# Patient Record
Sex: FEMALE | Race: WHITE | NOT HISPANIC OR LATINO | Employment: PART TIME | RURAL
[De-identification: names, ages, dates, MRNs, and addresses within clinical notes are randomized per-mention and may not be internally consistent; named-entity substitution may affect disease eponyms.]

---

## 2018-10-17 ENCOUNTER — HISTORICAL (OUTPATIENT)
Dept: ADMINISTRATIVE | Facility: HOSPITAL | Age: 68
End: 2018-10-17

## 2018-10-18 LAB
LAB AP CLINICAL INFORMATION: NORMAL
LAB AP COMMENTS: NORMAL
LAB AP DIAGNOSIS - HISTORICAL: NORMAL
LAB AP GROSS PATHOLOGY - HISTORICAL: NORMAL
LAB AP SPECIMEN SUBMITTED - HISTORICAL: NORMAL

## 2018-11-30 ENCOUNTER — HISTORICAL (OUTPATIENT)
Dept: ADMINISTRATIVE | Facility: HOSPITAL | Age: 68
End: 2018-11-30

## 2018-12-03 LAB
LAB AP CLINICAL INFORMATION: NORMAL
LAB AP DIAGNOSIS - HISTORICAL: NORMAL
LAB AP GROSS PATHOLOGY - HISTORICAL: NORMAL
LAB AP SPECIMEN SUBMITTED - HISTORICAL: NORMAL

## 2020-04-01 ENCOUNTER — HISTORICAL (OUTPATIENT)
Dept: ADMINISTRATIVE | Facility: HOSPITAL | Age: 70
End: 2020-04-01

## 2020-04-01 LAB
BACTERIA #/AREA URNS HPF: ABNORMAL /HPF
BILIRUB UR QL STRIP: NEGATIVE MG/DL
CLARITY UR: CLEAR
COLOR UR: YELLOW
GLUCOSE UR STRIP-MCNC: NEGATIVE MG/DL
KETONES UR STRIP-SCNC: NEGATIVE MG/DL
LEUKOCYTE ESTERASE UR QL STRIP: NEGATIVE LEU/UL
MUCOUS THREADS #/AREA URNS HPF: ABNORMAL /HPF
NITRITE UR QL STRIP: NEGATIVE
PH UR STRIP: 5.5 PH UNITS (ref 5–8)
PROT UR QL STRIP: NEGATIVE MG/DL
RBC # UR STRIP: NEGATIVE ERY/UL
RBC #/AREA URNS HPF: ABNORMAL /HPF (ref 0–3)
SP GR UR STRIP: 1.02 (ref 1–1.03)
SQUAMOUS #/AREA URNS LPF: ABNORMAL /LPF
UROBILINOGEN UR STRIP-ACNC: 0.2 EU/DL
WBC #/AREA URNS HPF: ABNORMAL /HPF (ref 0–5)

## 2020-04-03 LAB
REPORT: NORMAL

## 2020-06-03 ENCOUNTER — HISTORICAL (OUTPATIENT)
Dept: ADMINISTRATIVE | Facility: HOSPITAL | Age: 70
End: 2020-06-03

## 2020-08-26 ENCOUNTER — HISTORICAL (OUTPATIENT)
Dept: ADMINISTRATIVE | Facility: HOSPITAL | Age: 70
End: 2020-08-26

## 2020-10-02 ENCOUNTER — HISTORICAL (OUTPATIENT)
Dept: ADMINISTRATIVE | Facility: HOSPITAL | Age: 70
End: 2020-10-02

## 2020-10-03 ENCOUNTER — HISTORICAL (OUTPATIENT)
Dept: ADMINISTRATIVE | Facility: HOSPITAL | Age: 70
End: 2020-10-03

## 2020-10-06 LAB
ALBUMIN SERPL BCP-MCNC: 3.4 G/DL (ref 3.5–5)
ALBUMIN/GLOB SERPL: 1 {RATIO}
ALP SERPL-CCNC: 72 U/L (ref 55–142)
ALT SERPL W P-5'-P-CCNC: 21 U/L (ref 13–56)
ANION GAP SERPL CALCULATED.3IONS-SCNC: 9 MMOL/L
APTT PPP: 25.4 SECONDS (ref 25.2–37.3)
AST SERPL W P-5'-P-CCNC: 19 U/L (ref 15–37)
BASOPHILS # BLD AUTO: 0.05 X10E3/UL (ref 0–0.2)
BASOPHILS NFR BLD AUTO: 0.9 % (ref 0–1)
BILIRUB SERPL-MCNC: 0.1 MG/DL (ref 0–1.2)
BUN SERPL-MCNC: 13 MG/DL (ref 7–18)
BUN/CREAT SERPL: 16.3
CALCIUM SERPL-MCNC: 8.5 MG/DL (ref 8.5–10.1)
CHLORIDE SERPL-SCNC: 106 MMOL/L (ref 98–107)
CO2 SERPL-SCNC: 29 MMOL/L (ref 21–32)
CREAT SERPL-MCNC: 0.8 MG/DL (ref 0.55–1.02)
EOSINOPHIL # BLD AUTO: 0.23 X10E3/UL (ref 0–0.5)
EOSINOPHIL NFR BLD AUTO: 4 % (ref 1–4)
ERYTHROCYTE [DISTWIDTH] IN BLOOD BY AUTOMATED COUNT: 14.5 % (ref 11.5–14.5)
GLOBULIN SER-MCNC: 3.4 G/DL (ref 2–4)
GLUCOSE SERPL-MCNC: 86 MG/DL (ref 74–106)
HCT VFR BLD AUTO: 34.9 % (ref 38–47)
HGB BLD-MCNC: 11.7 G/DL (ref 12–16)
INR BLD: 1 (ref 0–3.4)
LYMPHOCYTES # BLD AUTO: 2.07 X10E3/UL (ref 1–4.8)
LYMPHOCYTES NFR BLD AUTO: 36.4 % (ref 27–41)
MAGNESIUM SERPL-MCNC: 2 MG/DL (ref 1.7–2.3)
MCH RBC QN AUTO: 29.8 PG (ref 27–31)
MCHC RBC AUTO-ENTMCNC: 33.5 G/DL (ref 32–36)
MCV RBC AUTO: 89 FL (ref 80–96)
MONOCYTES # BLD AUTO: 0.89 X10E3/UL (ref 0–0.8)
MONOCYTES NFR BLD AUTO: 15.6 % (ref 2–6)
MPC BLD CALC-MCNC: 10 FL (ref 9.4–12.4)
NEUTROPHILS # BLD AUTO: 2.45 X10E3/UL (ref 1.8–7.7)
NEUTROPHILS NFR BLD AUTO: 43.1 % (ref 53–65)
PLATELET # BLD AUTO: 231 X10E3/UL (ref 150–400)
POTASSIUM SERPL-SCNC: 3.8 MMOL/L (ref 3.5–5.1)
PROT SERPL-MCNC: 6.8 G/DL (ref 6.4–8.2)
PROTHROMBIN TIME: 12.7 SECONDS (ref 11.7–14.7)
RBC # BLD AUTO: 3.92 X10E6/UL (ref 4.2–5.4)
SODIUM SERPL-SCNC: 140 MMOL/L (ref 136–145)
TROPONIN I SERPL-MCNC: <0.017 NG/ML (ref 0–0.06)
WBC # BLD AUTO: 5.69 X10E3/UL (ref 4.5–11)

## 2021-04-20 ENCOUNTER — TELEPHONE (OUTPATIENT)
Dept: OBSTETRICS AND GYNECOLOGY | Facility: CLINIC | Age: 71
End: 2021-04-20

## 2021-04-20 RX ORDER — SULFAMETHOXAZOLE AND TRIMETHOPRIM 800; 160 MG/1; MG/1
1 TABLET ORAL 2 TIMES DAILY
COMMUNITY
Start: 2020-02-12 | End: 2021-05-26

## 2021-04-20 RX ORDER — NITROFURANTOIN 25; 75 MG/1; MG/1
100 CAPSULE ORAL DAILY
COMMUNITY
Start: 2020-03-31 | End: 2021-05-26

## 2021-04-20 RX ORDER — ESTRADIOL 0.1 MG/G
1 CREAM VAGINAL
COMMUNITY
Start: 2020-02-12 | End: 2021-05-26

## 2021-04-29 ENCOUNTER — TELEPHONE (OUTPATIENT)
Dept: OBSTETRICS AND GYNECOLOGY | Facility: CLINIC | Age: 71
End: 2021-04-29

## 2021-05-26 ENCOUNTER — OFFICE VISIT (OUTPATIENT)
Dept: CARDIOLOGY | Facility: CLINIC | Age: 71
End: 2021-05-26
Payer: MEDICARE

## 2021-05-26 VITALS
OXYGEN SATURATION: 98 % | BODY MASS INDEX: 35.77 KG/M2 | HEART RATE: 88 BPM | DIASTOLIC BLOOD PRESSURE: 80 MMHG | WEIGHT: 236 LBS | HEIGHT: 68 IN | SYSTOLIC BLOOD PRESSURE: 120 MMHG

## 2021-05-26 DIAGNOSIS — R06.02 SHORTNESS OF BREATH: ICD-10-CM

## 2021-05-26 DIAGNOSIS — G47.30 SLEEP APNEA, UNSPECIFIED TYPE: ICD-10-CM

## 2021-05-26 DIAGNOSIS — I25.110 ATHEROSCLEROSIS OF NATIVE CORONARY ARTERY OF NATIVE HEART WITH UNSTABLE ANGINA PECTORIS: ICD-10-CM

## 2021-05-26 DIAGNOSIS — R53.83 FATIGUE, UNSPECIFIED TYPE: Primary | ICD-10-CM

## 2021-05-26 PROCEDURE — 99204 OFFICE O/P NEW MOD 45 MIN: CPT | Mod: S$PBB,,, | Performed by: INTERNAL MEDICINE

## 2021-05-26 PROCEDURE — 99204 PR OFFICE/OUTPT VISIT, NEW, LEVL IV, 45-59 MIN: ICD-10-PCS | Mod: S$PBB,,, | Performed by: INTERNAL MEDICINE

## 2021-05-26 PROCEDURE — 99214 OFFICE O/P EST MOD 30 MIN: CPT | Mod: PBBFAC | Performed by: INTERNAL MEDICINE

## 2021-05-26 RX ORDER — CLOPIDOGREL BISULFATE 75 MG/1
75 TABLET ORAL DAILY
COMMUNITY
End: 2022-02-08

## 2021-05-26 RX ORDER — ASPIRIN 81 MG/1
81 TABLET ORAL DAILY
COMMUNITY

## 2021-05-28 ENCOUNTER — OFFICE VISIT (OUTPATIENT)
Dept: FAMILY MEDICINE | Facility: CLINIC | Age: 71
End: 2021-05-28
Payer: MEDICARE

## 2021-05-28 VITALS
RESPIRATION RATE: 20 BRPM | TEMPERATURE: 98 F | SYSTOLIC BLOOD PRESSURE: 129 MMHG | HEIGHT: 68 IN | BODY MASS INDEX: 35.92 KG/M2 | WEIGHT: 237 LBS | OXYGEN SATURATION: 98 % | DIASTOLIC BLOOD PRESSURE: 74 MMHG | HEART RATE: 63 BPM

## 2021-05-28 DIAGNOSIS — Z02.4 ENCOUNTER FOR DEPARTMENT OF TRANSPORTATION (DOT) EXAMINATION FOR DRIVING LICENSE RENEWAL: Primary | ICD-10-CM

## 2021-05-28 PROCEDURE — 99499 UNLISTED E&M SERVICE: CPT | Mod: ,,, | Performed by: NURSE PRACTITIONER

## 2021-05-28 PROCEDURE — 99499 PR PHYSICAL - DOT/CDL: ICD-10-PCS | Mod: ,,, | Performed by: NURSE PRACTITIONER

## 2021-06-01 ENCOUNTER — HOSPITAL ENCOUNTER (EMERGENCY)
Facility: HOSPITAL | Age: 71
Discharge: HOME | End: 2021-06-01
Payer: MEDICARE

## 2021-06-01 VITALS
BODY MASS INDEX: 35.38 KG/M2 | RESPIRATION RATE: 18 BRPM | HEART RATE: 80 BPM | DIASTOLIC BLOOD PRESSURE: 68 MMHG | HEIGHT: 68 IN | SYSTOLIC BLOOD PRESSURE: 106 MMHG | TEMPERATURE: 98 F | OXYGEN SATURATION: 96 % | WEIGHT: 233.44 LBS

## 2021-06-01 DIAGNOSIS — K52.9 GASTROENTERITIS: Primary | ICD-10-CM

## 2021-06-01 DIAGNOSIS — R07.9 CHEST PAIN: ICD-10-CM

## 2021-06-01 LAB
ALBUMIN SERPL BCP-MCNC: 3.3 G/DL (ref 3.5–5)
ALBUMIN/GLOB SERPL: 0.9 {RATIO}
ALP SERPL-CCNC: 80 U/L (ref 55–142)
ALT SERPL W P-5'-P-CCNC: 28 U/L (ref 13–56)
AMYLASE SERPL-CCNC: 36 U/L (ref 25–115)
ANION GAP SERPL CALCULATED.3IONS-SCNC: 15 MMOL/L (ref 7–16)
APTT PPP: 25.2 SECONDS (ref 25.2–37.3)
AST SERPL W P-5'-P-CCNC: 17 U/L (ref 15–37)
BACTERIA #/AREA URNS HPF: ABNORMAL /HPF
BASOPHILS # BLD AUTO: 0.01 K/UL (ref 0–0.2)
BASOPHILS NFR BLD AUTO: 0.2 % (ref 0–1)
BILIRUB SERPL-MCNC: 0.3 MG/DL (ref 0–1.2)
BILIRUB UR QL STRIP: ABNORMAL
BUN SERPL-MCNC: 17 MG/DL (ref 7–18)
BUN/CREAT SERPL: 19 (ref 6–20)
CALCIUM SERPL-MCNC: 8 MG/DL (ref 8.5–10.1)
CHLORIDE SERPL-SCNC: 102 MMOL/L (ref 98–107)
CLARITY UR: CLEAR
CO2 SERPL-SCNC: 25 MMOL/L (ref 21–32)
COLOR UR: YELLOW
CREAT SERPL-MCNC: 0.88 MG/DL (ref 0.55–1.02)
EOSINOPHIL # BLD AUTO: 0.17 K/UL (ref 0–0.5)
EOSINOPHIL NFR BLD AUTO: 2.6 % (ref 1–4)
ERYTHROCYTE [DISTWIDTH] IN BLOOD BY AUTOMATED COUNT: 14.2 % (ref 11.5–14.5)
FLUAV AG UPPER RESP QL IA.RAPID: NEGATIVE
FLUBV AG UPPER RESP QL IA.RAPID: NEGATIVE
GLOBULIN SER-MCNC: 3.5 G/DL (ref 2–4)
GLUCOSE SERPL-MCNC: 98 MG/DL (ref 74–106)
GLUCOSE UR STRIP-MCNC: NEGATIVE MG/DL
HCT VFR BLD AUTO: 40.8 % (ref 38–47)
HGB BLD-MCNC: 13.6 G/DL (ref 12–16)
INR BLD: 0.99 (ref 0.9–1.1)
KETONES UR STRIP-SCNC: NEGATIVE MG/DL
LEUKOCYTE ESTERASE UR QL STRIP: NEGATIVE
LIPASE SERPL-CCNC: 73 U/L (ref 73–393)
LYMPHOCYTES # BLD AUTO: 0.99 K/UL (ref 1–4.8)
LYMPHOCYTES NFR BLD AUTO: 15.3 % (ref 27–41)
MAGNESIUM SERPL-MCNC: 1.8 MG/DL (ref 1.7–2.3)
MCH RBC QN AUTO: 30 PG (ref 27–31)
MCHC RBC AUTO-ENTMCNC: 33.3 G/DL (ref 32–36)
MCV RBC AUTO: 90.1 FL (ref 80–96)
MONOCYTES # BLD AUTO: 0.52 K/UL (ref 0–0.8)
MONOCYTES NFR BLD AUTO: 8 % (ref 2–6)
MPC BLD CALC-MCNC: 10.5 FL (ref 9.4–12.4)
MUCOUS THREADS #/AREA URNS HPF: ABNORMAL /HPF
NEUTROPHILS # BLD AUTO: 4.8 K/UL (ref 1.8–7.7)
NEUTROPHILS NFR BLD AUTO: 73.9 % (ref 53–65)
NITRITE UR QL STRIP: NEGATIVE
NT-PROBNP SERPL-MCNC: 72 PG/ML (ref 1–125)
PH UR STRIP: 5.5 PH UNITS
PLATELET # BLD AUTO: 261 K/UL (ref 150–400)
POTASSIUM SERPL-SCNC: 3.7 MMOL/L (ref 3.5–5.1)
PROT SERPL-MCNC: 6.8 G/DL (ref 6.4–8.2)
PROT UR QL STRIP: 100
PROTHROMBIN TIME: 12.7 SECONDS (ref 11.7–14.7)
RBC # BLD AUTO: 4.53 M/UL (ref 4.2–5.4)
RBC # UR STRIP: NEGATIVE /UL
RBC #/AREA URNS HPF: ABNORMAL /HPF
SARS-COV+SARS-COV-2 AG RESP QL IA.RAPID: NEGATIVE
SODIUM SERPL-SCNC: 138 MMOL/L (ref 136–145)
SP GR UR STRIP: >=1.03
SQUAMOUS #/AREA URNS LPF: ABNORMAL /LPF
TROPONIN I SERPL-MCNC: <0.017 NG/ML
TROPONIN I SERPL-MCNC: <0.017 NG/ML
UROBILINOGEN UR STRIP-ACNC: 0.2 MG/DL
WBC # BLD AUTO: 6.49 K/UL (ref 4.5–11)
WBC #/AREA URNS HPF: ABNORMAL /HPF

## 2021-06-01 PROCEDURE — 93010 ELECTROCARDIOGRAM REPORT: CPT | Performed by: FAMILY MEDICINE

## 2021-06-01 PROCEDURE — 99284 EMERGENCY DEPT VISIT MOD MDM: CPT | Mod: GF | Performed by: NURSE PRACTITIONER

## 2021-06-01 PROCEDURE — 82150 ASSAY OF AMYLASE: CPT | Performed by: NURSE PRACTITIONER

## 2021-06-01 PROCEDURE — 93005 ELECTROCARDIOGRAM TRACING: CPT

## 2021-06-01 PROCEDURE — 63600175 PHARM REV CODE 636 W HCPCS: Performed by: NURSE PRACTITIONER

## 2021-06-01 PROCEDURE — 82310 ASSAY OF CALCIUM: CPT | Performed by: NURSE PRACTITIONER

## 2021-06-01 PROCEDURE — 36415 COLL VENOUS BLD VENIPUNCTURE: CPT | Performed by: NURSE PRACTITIONER

## 2021-06-01 PROCEDURE — 80053 COMPREHEN METABOLIC PANEL: CPT | Performed by: NURSE PRACTITIONER

## 2021-06-01 PROCEDURE — 83690 ASSAY OF LIPASE: CPT | Performed by: NURSE PRACTITIONER

## 2021-06-01 PROCEDURE — 96374 THER/PROPH/DIAG INJ IV PUSH: CPT

## 2021-06-01 PROCEDURE — 83880 ASSAY OF NATRIURETIC PEPTIDE: CPT | Performed by: NURSE PRACTITIONER

## 2021-06-01 PROCEDURE — 87426 SARSCOV CORONAVIRUS AG IA: CPT | Mod: CR | Performed by: NURSE PRACTITIONER

## 2021-06-01 PROCEDURE — 81001 URINALYSIS AUTO W/SCOPE: CPT | Performed by: NURSE PRACTITIONER

## 2021-06-01 PROCEDURE — 25000003 PHARM REV CODE 250: Performed by: NURSE PRACTITIONER

## 2021-06-01 PROCEDURE — 99284 EMERGENCY DEPT VISIT MOD MDM: CPT | Mod: 25,CS

## 2021-06-01 PROCEDURE — 85610 PROTHROMBIN TIME: CPT | Performed by: NURSE PRACTITIONER

## 2021-06-01 PROCEDURE — 83735 ASSAY OF MAGNESIUM: CPT | Performed by: NURSE PRACTITIONER

## 2021-06-01 PROCEDURE — 84484 ASSAY OF TROPONIN QUANT: CPT | Performed by: NURSE PRACTITIONER

## 2021-06-01 PROCEDURE — 96361 HYDRATE IV INFUSION ADD-ON: CPT

## 2021-06-01 PROCEDURE — 87804 INFLUENZA ASSAY W/OPTIC: CPT | Performed by: NURSE PRACTITIONER

## 2021-06-01 PROCEDURE — 96372 THER/PROPH/DIAG INJ SC/IM: CPT | Mod: 59

## 2021-06-01 PROCEDURE — 85025 COMPLETE CBC W/AUTO DIFF WBC: CPT | Performed by: NURSE PRACTITIONER

## 2021-06-01 PROCEDURE — 85730 THROMBOPLASTIN TIME PARTIAL: CPT | Performed by: NURSE PRACTITIONER

## 2021-06-01 PROCEDURE — 81003 URINALYSIS AUTO W/O SCOPE: CPT | Performed by: NURSE PRACTITIONER

## 2021-06-01 RX ORDER — PROMETHAZINE HYDROCHLORIDE 25 MG/1
25 TABLET ORAL EVERY 6 HOURS PRN
Qty: 15 TABLET | Refills: 0 | Status: SHIPPED | OUTPATIENT
Start: 2021-06-01 | End: 2022-02-08

## 2021-06-01 RX ORDER — ONDANSETRON 4 MG/1
4 TABLET, FILM COATED ORAL EVERY 6 HOURS
Qty: 12 TABLET | Refills: 0 | Status: SHIPPED | OUTPATIENT
Start: 2021-06-01 | End: 2021-06-01

## 2021-06-01 RX ORDER — ONDANSETRON 4 MG/1
4 TABLET, FILM COATED ORAL EVERY 6 HOURS
Qty: 12 TABLET | Refills: 0 | Status: SHIPPED | OUTPATIENT
Start: 2021-06-01 | End: 2022-02-08

## 2021-06-01 RX ORDER — ONDANSETRON 2 MG/ML
4 INJECTION INTRAMUSCULAR; INTRAVENOUS
Status: COMPLETED | OUTPATIENT
Start: 2021-06-01 | End: 2021-06-01

## 2021-06-01 RX ORDER — PROMETHAZINE HYDROCHLORIDE 25 MG/1
25 TABLET ORAL EVERY 6 HOURS PRN
Qty: 15 TABLET | Refills: 0 | Status: SHIPPED | OUTPATIENT
Start: 2021-06-01 | End: 2021-06-01

## 2021-06-01 RX ADMIN — SODIUM CHLORIDE 1000 ML: 9 INJECTION, SOLUTION INTRAVENOUS at 08:06

## 2021-06-01 RX ADMIN — ONDANSETRON 4 MG: 2 INJECTION INTRAMUSCULAR; INTRAVENOUS at 07:06

## 2021-06-01 RX ADMIN — ONDANSETRON 4 MG: 2 INJECTION INTRAMUSCULAR; INTRAVENOUS at 10:06

## 2021-06-06 ENCOUNTER — HOSPITAL ENCOUNTER (EMERGENCY)
Facility: HOSPITAL | Age: 71
Discharge: ANOTHER HOSPITAL | End: 2021-06-06
Payer: MEDICARE

## 2021-06-06 ENCOUNTER — HOSPITAL ENCOUNTER (INPATIENT)
Facility: HOSPITAL | Age: 71
LOS: 2 days | Discharge: HOME | DRG: 287 | End: 2021-06-08
Attending: INTERNAL MEDICINE | Admitting: HOSPITALIST
Payer: MEDICARE

## 2021-06-06 VITALS
HEART RATE: 63 BPM | TEMPERATURE: 98 F | BODY MASS INDEX: 35.31 KG/M2 | DIASTOLIC BLOOD PRESSURE: 74 MMHG | WEIGHT: 233 LBS | OXYGEN SATURATION: 96 % | SYSTOLIC BLOOD PRESSURE: 133 MMHG | RESPIRATION RATE: 18 BRPM | HEIGHT: 68 IN

## 2021-06-06 DIAGNOSIS — R07.9 CHEST PAIN: ICD-10-CM

## 2021-06-06 DIAGNOSIS — I24.9 ACS (ACUTE CORONARY SYNDROME): ICD-10-CM

## 2021-06-06 DIAGNOSIS — R79.89 POSITIVE D-DIMER: ICD-10-CM

## 2021-06-06 DIAGNOSIS — R07.9 CHEST PAIN, UNSPECIFIED TYPE: ICD-10-CM

## 2021-06-06 DIAGNOSIS — R07.9 CHEST PAIN, UNSPECIFIED TYPE: Primary | ICD-10-CM

## 2021-06-06 DIAGNOSIS — I25.10 CAD (CORONARY ARTERY DISEASE): ICD-10-CM

## 2021-06-06 DIAGNOSIS — I21.4 NSTEMI (NON-ST ELEVATED MYOCARDIAL INFARCTION): ICD-10-CM

## 2021-06-06 DIAGNOSIS — I25.110 ATHEROSCLEROSIS OF NATIVE CORONARY ARTERY OF NATIVE HEART WITH UNSTABLE ANGINA PECTORIS: Primary | ICD-10-CM

## 2021-06-06 LAB
ALBUMIN SERPL BCP-MCNC: 3.2 G/DL (ref 3.5–5)
ALBUMIN/GLOB SERPL: 0.9 {RATIO}
ALP SERPL-CCNC: 82 U/L (ref 55–142)
ALT SERPL W P-5'-P-CCNC: 35 U/L (ref 13–56)
ANION GAP SERPL CALCULATED.3IONS-SCNC: 13 MMOL/L (ref 7–16)
ANISOCYTOSIS BLD QL SMEAR: ABNORMAL
AST SERPL W P-5'-P-CCNC: 19 U/L (ref 15–37)
ATYPICAL LYMPHOCYTES: ABNORMAL
BASOPHILS # BLD AUTO: 0.03 K/UL (ref 0–0.2)
BASOPHILS NFR BLD AUTO: 0.5 % (ref 0–1)
BASOPHILS NFR BLD MANUAL: 0 % (ref 0–1)
BILIRUB SERPL-MCNC: 0.2 MG/DL (ref 0–1.2)
BUN SERPL-MCNC: 13 MG/DL (ref 7–18)
BUN/CREAT SERPL: 15 (ref 6–20)
CALCIUM SERPL-MCNC: 8.2 MG/DL (ref 8.5–10.1)
CHLORIDE SERPL-SCNC: 107 MMOL/L (ref 98–107)
CO2 SERPL-SCNC: 27 MMOL/L (ref 21–32)
CREAT SERPL-MCNC: 0.87 MG/DL (ref 0.55–1.02)
D DIMER PPP FEU-MCNC: 0.67 ΜG/ML (ref 0–0.47)
DIFFERENTIAL METHOD BLD: ABNORMAL
EOSINOPHIL # BLD AUTO: 0.25 K/UL (ref 0–0.5)
EOSINOPHIL NFR BLD AUTO: 3.9 % (ref 1–4)
EOSINOPHIL NFR BLD MANUAL: 5 % (ref 1–4)
ERYTHROCYTE [DISTWIDTH] IN BLOOD BY AUTOMATED COUNT: 13.6 % (ref 11.5–14.5)
GLOBULIN SER-MCNC: 3.4 G/DL (ref 2–4)
GLUCOSE SERPL-MCNC: 128 MG/DL (ref 74–106)
HCT VFR BLD AUTO: 37.3 % (ref 38–47)
HGB BLD-MCNC: 12.3 G/DL (ref 12–16)
HYPOCHROMIA BLD QL SMEAR: ABNORMAL
LYMPHOCYTES # BLD AUTO: 2.33 K/UL (ref 1–4.8)
LYMPHOCYTES NFR BLD AUTO: 36.3 % (ref 27–41)
LYMPHOCYTES NFR BLD MANUAL: 37 % (ref 27–41)
MCH RBC QN AUTO: 29.9 PG (ref 27–31)
MCHC RBC AUTO-ENTMCNC: 33 G/DL (ref 32–36)
MCV RBC AUTO: 90.5 FL (ref 80–96)
MONOCYTES # BLD AUTO: 0.56 K/UL (ref 0–0.8)
MONOCYTES NFR BLD AUTO: 8.7 % (ref 2–6)
MONOCYTES NFR BLD MANUAL: 4 % (ref 2–6)
MPC BLD CALC-MCNC: 10.3 FL (ref 9.4–12.4)
NEUTROPHILS # BLD AUTO: 3.24 K/UL (ref 1.8–7.7)
NEUTROPHILS NFR BLD AUTO: 50.6 % (ref 53–65)
NEUTS SEG NFR BLD MANUAL: 54 % (ref 50–62)
NRBC BLD MANUAL-RTO: ABNORMAL %
PLATELET # BLD AUTO: 271 K/UL (ref 150–400)
PLATELET MORPHOLOGY: NORMAL
POIKILOCYTOSIS BLD QL SMEAR: ABNORMAL
POTASSIUM SERPL-SCNC: 3.5 MMOL/L (ref 3.5–5.1)
PROT SERPL-MCNC: 6.6 G/DL (ref 6.4–8.2)
RBC # BLD AUTO: 4.12 M/UL (ref 4.2–5.4)
SODIUM SERPL-SCNC: 143 MMOL/L (ref 136–145)
TROPONIN I SERPL-MCNC: <0.017 NG/ML
WBC # BLD AUTO: 6.41 K/UL (ref 4.5–11)

## 2021-06-06 PROCEDURE — 11000001 HC ACUTE MED/SURG PRIVATE ROOM

## 2021-06-06 PROCEDURE — 99284 EMERGENCY DEPT VISIT MOD MDM: CPT | Performed by: FAMILY MEDICINE

## 2021-06-06 PROCEDURE — 25500020 PHARM REV CODE 255: Performed by: FAMILY MEDICINE

## 2021-06-06 PROCEDURE — 85025 COMPLETE CBC W/AUTO DIFF WBC: CPT | Performed by: FAMILY MEDICINE

## 2021-06-06 PROCEDURE — 93005 ELECTROCARDIOGRAM TRACING: CPT

## 2021-06-06 PROCEDURE — 93010 ELECTROCARDIOGRAM REPORT: CPT | Performed by: FAMILY MEDICINE

## 2021-06-06 PROCEDURE — 84484 ASSAY OF TROPONIN QUANT: CPT | Performed by: FAMILY MEDICINE

## 2021-06-06 PROCEDURE — 96372 THER/PROPH/DIAG INJ SC/IM: CPT | Mod: 59

## 2021-06-06 PROCEDURE — 25000003 PHARM REV CODE 250: Performed by: FAMILY MEDICINE

## 2021-06-06 PROCEDURE — 85378 FIBRIN DEGRADE SEMIQUANT: CPT | Performed by: FAMILY MEDICINE

## 2021-06-06 PROCEDURE — 99285 EMERGENCY DEPT VISIT HI MDM: CPT | Mod: 25

## 2021-06-06 PROCEDURE — 80053 COMPREHEN METABOLIC PANEL: CPT | Performed by: FAMILY MEDICINE

## 2021-06-06 PROCEDURE — 36415 COLL VENOUS BLD VENIPUNCTURE: CPT | Performed by: FAMILY MEDICINE

## 2021-06-06 RX ORDER — IBUPROFEN 200 MG
16 TABLET ORAL
Status: CANCELLED | OUTPATIENT
Start: 2021-06-07

## 2021-06-06 RX ORDER — ACETAMINOPHEN 325 MG/1
650 TABLET ORAL EVERY 6 HOURS PRN
Status: DISCONTINUED | OUTPATIENT
Start: 2021-06-07 | End: 2021-06-08 | Stop reason: HOSPADM

## 2021-06-06 RX ORDER — ASPIRIN 81 MG/1
81 TABLET ORAL DAILY
Status: DISCONTINUED | OUTPATIENT
Start: 2021-06-07 | End: 2021-06-08 | Stop reason: HOSPADM

## 2021-06-06 RX ORDER — ASPIRIN 325 MG
325 TABLET ORAL
Status: COMPLETED | OUTPATIENT
Start: 2021-06-06 | End: 2021-06-06

## 2021-06-06 RX ORDER — IBUPROFEN 200 MG
24 TABLET ORAL
Status: CANCELLED | OUTPATIENT
Start: 2021-06-07

## 2021-06-06 RX ADMIN — IOPAMIDOL 100 ML: 755 INJECTION, SOLUTION INTRAVENOUS at 08:06

## 2021-06-06 RX ADMIN — ASPIRIN 325 MG ORAL TABLET 325 MG: 325 PILL ORAL at 07:06

## 2021-06-07 PROBLEM — R07.9 CHEST PAIN: Status: RESOLVED | Noted: 2021-06-07 | Resolved: 2021-06-07

## 2021-06-07 PROBLEM — R07.9 CHEST PAIN: Status: ACTIVE | Noted: 2021-06-07

## 2021-06-07 PROBLEM — R79.89 POSITIVE D-DIMER: Status: ACTIVE | Noted: 2021-06-07

## 2021-06-07 LAB
ANION GAP SERPL CALCULATED.3IONS-SCNC: 16 MMOL/L (ref 7–16)
AORTIC ROOT ANNULUS: 3 CM
AORTIC VALVE CUSP SEPERATION: 2.17 CM
AV INDEX (PROSTH): 0.78
AV MEAN GRADIENT: 2 MMHG
AV PEAK GRADIENT: 3 MMHG
AV VALVE AREA: 1.98 CM2
AV VELOCITY RATIO: 0.78
BASOPHILS # BLD AUTO: 0.04 K/UL (ref 0–0.2)
BASOPHILS NFR BLD AUTO: 0.6 % (ref 0–1)
BSA FOR ECHO PROCEDURE: 2.29 M2
BUN SERPL-MCNC: 12 MG/DL (ref 7–18)
BUN/CREAT SERPL: 13 (ref 6–20)
CALCIUM SERPL-MCNC: 8 MG/DL (ref 8.5–10.1)
CHLORIDE SERPL-SCNC: 110 MMOL/L (ref 98–107)
CO2 SERPL-SCNC: 26 MMOL/L (ref 21–32)
CREAT SERPL-MCNC: 0.93 MG/DL (ref 0.55–1.02)
CV ECHO LV RWT: 0.47 CM
DIFFERENTIAL METHOD BLD: ABNORMAL
DOP CALC AO PEAK VEL: 0.9 M/S
DOP CALC AO VTI: 18 CM
DOP CALC LVOT AREA: 2.5 CM2
DOP CALC LVOT DIAMETER: 1.8 CM
DOP CALC LVOT PEAK VEL: 0.7 M/S
DOP CALC LVOT STROKE VOLUME: 35.61 CM3
DOP CALCLVOT PEAK VEL VTI: 14 CM
E WAVE DECELERATION TIME: 192 MSEC
ECHO EF ESTIMATED: 55 %
ECHO LV POSTERIOR WALL: 0.96 CM (ref 0.6–1.1)
EJECTION FRACTION: 60 %
EOSINOPHIL # BLD AUTO: 0.28 K/UL (ref 0–0.5)
EOSINOPHIL NFR BLD AUTO: 4.3 % (ref 1–4)
ERYTHROCYTE [DISTWIDTH] IN BLOOD BY AUTOMATED COUNT: 13.2 % (ref 11.5–14.5)
FRACTIONAL SHORTENING: 28 % (ref 28–44)
GLUCOSE SERPL-MCNC: 108 MG/DL (ref 70–105)
GLUCOSE SERPL-MCNC: 113 MG/DL (ref 70–105)
GLUCOSE SERPL-MCNC: 159 MG/DL (ref 74–106)
HCT VFR BLD AUTO: 34 % (ref 38–47)
HGB BLD-MCNC: 11.2 G/DL (ref 12–16)
IMM GRANULOCYTES # BLD AUTO: 0.02 K/UL (ref 0–0.04)
IMM GRANULOCYTES NFR BLD: 0.3 % (ref 0–0.4)
INTERVENTRICULAR SEPTUM: 0.92 CM (ref 0.6–1.1)
IVC OSTIUM: 1.1 CM
LEFT ATRIUM SIZE: 3.3 CM
LEFT INTERNAL DIMENSION IN SYSTOLE: 2.95 CM (ref 2.1–4)
LEFT VENTRICLE MASS INDEX: 55 G/M2
LEFT VENTRICULAR INTERNAL DIMENSION IN DIASTOLE: 4.12 CM (ref 3.5–6)
LEFT VENTRICULAR MASS: 122.13 G
LVOT MG: 1 MMHG
LYMPHOCYTES # BLD AUTO: 2.7 K/UL (ref 1–4.8)
LYMPHOCYTES NFR BLD AUTO: 41.2 % (ref 27–41)
MCH RBC QN AUTO: 29.5 PG (ref 27–31)
MCHC RBC AUTO-ENTMCNC: 32.9 G/DL (ref 32–36)
MCV RBC AUTO: 89.5 FL (ref 80–96)
MONOCYTES # BLD AUTO: 0.59 K/UL (ref 0–0.8)
MONOCYTES NFR BLD AUTO: 9 % (ref 2–6)
MPC BLD CALC-MCNC: 10.1 FL (ref 9.4–12.4)
MV PEAK E VEL: 0.84 M/S
NEUTROPHILS # BLD AUTO: 2.93 K/UL (ref 1.8–7.7)
NEUTROPHILS NFR BLD AUTO: 44.6 % (ref 53–65)
NRBC # BLD AUTO: 0 X10E3/UL
NRBC, AUTO (.00): 0 %
PISA TR MAX VEL: 2.7 M/S
PLATELET # BLD AUTO: 228 K/UL (ref 150–400)
POTASSIUM SERPL-SCNC: 3.7 MMOL/L (ref 3.5–5.1)
RA MAJOR: 3.7 CM
RA PRESSURE: 3 MMHG
RBC # BLD AUTO: 3.8 M/UL (ref 4.2–5.4)
RIGHT VENTRICULAR END-DIASTOLIC DIMENSION: 3.7 CM
SODIUM SERPL-SCNC: 148 MMOL/L (ref 136–145)
TR MAX PG: 29 MMHG
TRICUSPID ANNULAR PLANE SYSTOLIC EXCURSION: 2.4 CM
TROPONIN I SERPL-MCNC: <0.017 NG/ML
TROPONIN I SERPL-MCNC: <0.017 NG/ML
TV REST PULMONARY ARTERY PRESSURE: 32 MMHG
WBC # BLD AUTO: 6.56 K/UL (ref 4.5–11)

## 2021-06-07 PROCEDURE — 84484 ASSAY OF TROPONIN QUANT: CPT | Performed by: FAMILY MEDICINE

## 2021-06-07 PROCEDURE — C1894 INTRO/SHEATH, NON-LASER: HCPCS | Performed by: STUDENT IN AN ORGANIZED HEALTH CARE EDUCATION/TRAINING PROGRAM

## 2021-06-07 PROCEDURE — 93010 EKG 12-LEAD: ICD-10-PCS | Mod: ,,, | Performed by: INTERNAL MEDICINE

## 2021-06-07 PROCEDURE — 11000001 HC ACUTE MED/SURG PRIVATE ROOM

## 2021-06-07 PROCEDURE — 93010 ELECTROCARDIOGRAM REPORT: CPT | Mod: ,,, | Performed by: INTERNAL MEDICINE

## 2021-06-07 PROCEDURE — 93458 L HRT ARTERY/VENTRICLE ANGIO: CPT | Mod: 26,,, | Performed by: STUDENT IN AN ORGANIZED HEALTH CARE EDUCATION/TRAINING PROGRAM

## 2021-06-07 PROCEDURE — 25000003 PHARM REV CODE 250: Performed by: STUDENT IN AN ORGANIZED HEALTH CARE EDUCATION/TRAINING PROGRAM

## 2021-06-07 PROCEDURE — 80048 BASIC METABOLIC PNL TOTAL CA: CPT | Performed by: FAMILY MEDICINE

## 2021-06-07 PROCEDURE — 93458 L HRT ARTERY/VENTRICLE ANGIO: CPT | Performed by: STUDENT IN AN ORGANIZED HEALTH CARE EDUCATION/TRAINING PROGRAM

## 2021-06-07 PROCEDURE — 25000003 PHARM REV CODE 250: Performed by: FAMILY MEDICINE

## 2021-06-07 PROCEDURE — 25000003 PHARM REV CODE 250: Performed by: NURSE PRACTITIONER

## 2021-06-07 PROCEDURE — 99223 PR INITIAL HOSPITAL CARE,LEVL III: ICD-10-PCS | Mod: 25,,, | Performed by: INTERNAL MEDICINE

## 2021-06-07 PROCEDURE — 27100168 OPTIME MED/SURG SUP & DEVICES NON-STERILE SUPPLY: Performed by: STUDENT IN AN ORGANIZED HEALTH CARE EDUCATION/TRAINING PROGRAM

## 2021-06-07 PROCEDURE — 99153 MOD SED SAME PHYS/QHP EA: CPT | Performed by: STUDENT IN AN ORGANIZED HEALTH CARE EDUCATION/TRAINING PROGRAM

## 2021-06-07 PROCEDURE — 63600175 PHARM REV CODE 636 W HCPCS: Performed by: STUDENT IN AN ORGANIZED HEALTH CARE EDUCATION/TRAINING PROGRAM

## 2021-06-07 PROCEDURE — 99223 PR INITIAL HOSPITAL CARE,LEVL III: ICD-10-PCS | Mod: AI,GC,, | Performed by: HOSPITALIST

## 2021-06-07 PROCEDURE — 82962 GLUCOSE BLOOD TEST: CPT

## 2021-06-07 PROCEDURE — C1887 CATHETER, GUIDING: HCPCS | Performed by: STUDENT IN AN ORGANIZED HEALTH CARE EDUCATION/TRAINING PROGRAM

## 2021-06-07 PROCEDURE — 25500020 PHARM REV CODE 255: Performed by: STUDENT IN AN ORGANIZED HEALTH CARE EDUCATION/TRAINING PROGRAM

## 2021-06-07 PROCEDURE — 93458 PR CATH PLACE/CORON ANGIO, IMG SUPER/INTERP,W LEFT HEART VENTRICULOGRAPHY: ICD-10-PCS | Mod: 26,,, | Performed by: STUDENT IN AN ORGANIZED HEALTH CARE EDUCATION/TRAINING PROGRAM

## 2021-06-07 PROCEDURE — 96372 THER/PROPH/DIAG INJ SC/IM: CPT

## 2021-06-07 PROCEDURE — 36415 COLL VENOUS BLD VENIPUNCTURE: CPT | Performed by: FAMILY MEDICINE

## 2021-06-07 PROCEDURE — C1769 GUIDE WIRE: HCPCS | Performed by: STUDENT IN AN ORGANIZED HEALTH CARE EDUCATION/TRAINING PROGRAM

## 2021-06-07 PROCEDURE — 93005 ELECTROCARDIOGRAM TRACING: CPT

## 2021-06-07 PROCEDURE — 99152 MOD SED SAME PHYS/QHP 5/>YRS: CPT | Performed by: STUDENT IN AN ORGANIZED HEALTH CARE EDUCATION/TRAINING PROGRAM

## 2021-06-07 PROCEDURE — 94761 N-INVAS EAR/PLS OXIMETRY MLT: CPT

## 2021-06-07 PROCEDURE — 27201423 OPTIME MED/SURG SUP & DEVICES STERILE SUPPLY: Performed by: STUDENT IN AN ORGANIZED HEALTH CARE EDUCATION/TRAINING PROGRAM

## 2021-06-07 PROCEDURE — 85025 COMPLETE CBC W/AUTO DIFF WBC: CPT | Performed by: FAMILY MEDICINE

## 2021-06-07 PROCEDURE — 27100005 HC ECG ELECTRODES

## 2021-06-07 PROCEDURE — 27000080 OPTIME MED/SURG SUP & DEVICES GENERAL CLASSIFICATION: Performed by: STUDENT IN AN ORGANIZED HEALTH CARE EDUCATION/TRAINING PROGRAM

## 2021-06-07 PROCEDURE — 63600175 PHARM REV CODE 636 W HCPCS: Performed by: NURSE PRACTITIONER

## 2021-06-07 PROCEDURE — 63600175 PHARM REV CODE 636 W HCPCS: Performed by: FAMILY MEDICINE

## 2021-06-07 PROCEDURE — 99223 1ST HOSP IP/OBS HIGH 75: CPT | Mod: 25,,, | Performed by: INTERNAL MEDICINE

## 2021-06-07 PROCEDURE — 99223 1ST HOSP IP/OBS HIGH 75: CPT | Mod: AI,GC,, | Performed by: HOSPITALIST

## 2021-06-07 RX ORDER — MORPHINE SULFATE 2 MG/ML
2 INJECTION, SOLUTION INTRAMUSCULAR; INTRAVENOUS EVERY 6 HOURS PRN
Status: DISCONTINUED | OUTPATIENT
Start: 2021-06-07 | End: 2021-06-08 | Stop reason: HOSPADM

## 2021-06-07 RX ORDER — FENTANYL CITRATE 50 UG/ML
INJECTION, SOLUTION INTRAMUSCULAR; INTRAVENOUS
Status: DISCONTINUED | OUTPATIENT
Start: 2021-06-07 | End: 2021-06-07 | Stop reason: HOSPADM

## 2021-06-07 RX ORDER — ONDANSETRON 4 MG/1
8 TABLET, ORALLY DISINTEGRATING ORAL EVERY 8 HOURS PRN
Status: DISCONTINUED | OUTPATIENT
Start: 2021-06-07 | End: 2021-06-08 | Stop reason: HOSPADM

## 2021-06-07 RX ORDER — HEPARIN SODIUM 200 [USP'U]/100ML
INJECTION, SOLUTION INTRAVENOUS
Status: DISCONTINUED | OUTPATIENT
Start: 2021-06-07 | End: 2021-06-08 | Stop reason: HOSPADM

## 2021-06-07 RX ORDER — PNV NO.95/FERROUS FUM/FOLIC AC 28MG-0.8MG
100 TABLET ORAL DAILY
COMMUNITY
End: 2022-02-08

## 2021-06-07 RX ORDER — NITROGLYCERIN 5 MG/ML
INJECTION, SOLUTION INTRAVENOUS
Status: DISCONTINUED | OUTPATIENT
Start: 2021-06-07 | End: 2021-06-07 | Stop reason: HOSPADM

## 2021-06-07 RX ORDER — CLOPIDOGREL BISULFATE 75 MG/1
75 TABLET ORAL DAILY
Status: DISCONTINUED | OUTPATIENT
Start: 2021-06-07 | End: 2021-06-08 | Stop reason: HOSPADM

## 2021-06-07 RX ORDER — HEPARIN SODIUM 5000 [USP'U]/ML
5000 INJECTION, SOLUTION INTRAVENOUS; SUBCUTANEOUS EVERY 8 HOURS
Status: DISCONTINUED | OUTPATIENT
Start: 2021-06-07 | End: 2021-06-08 | Stop reason: HOSPADM

## 2021-06-07 RX ORDER — HEPARIN SODIUM 1000 [USP'U]/ML
INJECTION, SOLUTION INTRAVENOUS; SUBCUTANEOUS
Status: DISCONTINUED | OUTPATIENT
Start: 2021-06-07 | End: 2021-06-07 | Stop reason: HOSPADM

## 2021-06-07 RX ORDER — LIDOCAINE HYDROCHLORIDE 10 MG/ML
INJECTION, SOLUTION EPIDURAL; INFILTRATION; INTRACAUDAL; PERINEURAL
Status: DISCONTINUED | OUTPATIENT
Start: 2021-06-07 | End: 2021-06-07 | Stop reason: HOSPADM

## 2021-06-07 RX ORDER — GLUCAGON 1 MG
1 KIT INJECTION
Status: DISCONTINUED | OUTPATIENT
Start: 2021-06-07 | End: 2021-06-08 | Stop reason: HOSPADM

## 2021-06-07 RX ORDER — ENOXAPARIN SODIUM 150 MG/ML
1 INJECTION SUBCUTANEOUS
Status: DISCONTINUED | OUTPATIENT
Start: 2021-06-07 | End: 2021-06-07

## 2021-06-07 RX ORDER — ACETAMINOPHEN 325 MG/1
650 TABLET ORAL EVERY 4 HOURS PRN
Status: DISCONTINUED | OUTPATIENT
Start: 2021-06-07 | End: 2021-06-08 | Stop reason: HOSPADM

## 2021-06-07 RX ORDER — ATORVASTATIN CALCIUM 40 MG/1
40 TABLET, FILM COATED ORAL NIGHTLY
Status: DISCONTINUED | OUTPATIENT
Start: 2021-06-07 | End: 2021-06-08 | Stop reason: HOSPADM

## 2021-06-07 RX ORDER — NITROGLYCERIN 400 UG/1
SPRAY ORAL
Status: DISCONTINUED | OUTPATIENT
Start: 2021-06-07 | End: 2021-06-07 | Stop reason: HOSPADM

## 2021-06-07 RX ORDER — ENOXAPARIN SODIUM 100 MG/ML
100 INJECTION SUBCUTANEOUS
Status: DISCONTINUED | OUTPATIENT
Start: 2021-06-07 | End: 2021-06-07

## 2021-06-07 RX ORDER — SODIUM CHLORIDE 450 MG/100ML
INJECTION, SOLUTION INTRAVENOUS
Status: DISCONTINUED | OUTPATIENT
Start: 2021-06-07 | End: 2021-06-08 | Stop reason: HOSPADM

## 2021-06-07 RX ORDER — MIDAZOLAM HYDROCHLORIDE 1 MG/ML
INJECTION INTRAMUSCULAR; INTRAVENOUS
Status: DISCONTINUED | OUTPATIENT
Start: 2021-06-07 | End: 2021-06-07 | Stop reason: HOSPADM

## 2021-06-07 RX ORDER — SODIUM CHLORIDE 0.9 % (FLUSH) 0.9 %
10 SYRINGE (ML) INJECTION
Status: DISCONTINUED | OUTPATIENT
Start: 2021-06-07 | End: 2021-06-08 | Stop reason: HOSPADM

## 2021-06-07 RX ORDER — FUROSEMIDE 10 MG/ML
40 INJECTION INTRAMUSCULAR; INTRAVENOUS ONCE
Status: COMPLETED | OUTPATIENT
Start: 2021-06-07 | End: 2021-06-07

## 2021-06-07 RX ORDER — CHOLECALCIFEROL (VITAMIN D3) 25 MCG
1000 TABLET ORAL DAILY
COMMUNITY

## 2021-06-07 RX ORDER — ISOSORBIDE MONONITRATE 30 MG/1
30 TABLET, EXTENDED RELEASE ORAL DAILY
Status: DISCONTINUED | OUTPATIENT
Start: 2021-06-07 | End: 2021-06-08 | Stop reason: HOSPADM

## 2021-06-07 RX ADMIN — HEPARIN SODIUM 5000 UNITS: 5000 INJECTION INTRAVENOUS; SUBCUTANEOUS at 09:06

## 2021-06-07 RX ADMIN — ACETAMINOPHEN 650 MG: 325 TABLET ORAL at 01:06

## 2021-06-07 RX ADMIN — ISOSORBIDE MONONITRATE 30 MG: 30 TABLET, EXTENDED RELEASE ORAL at 04:06

## 2021-06-07 RX ADMIN — FUROSEMIDE 40 MG: 10 INJECTION, SOLUTION INTRAVENOUS at 04:06

## 2021-06-07 RX ADMIN — ENOXAPARIN SODIUM 100 MG: 100 INJECTION SUBCUTANEOUS at 01:06

## 2021-06-07 RX ADMIN — ACETAMINOPHEN 650 MG: 325 TABLET ORAL at 10:06

## 2021-06-07 RX ADMIN — ASPIRIN 81 MG: 81 TABLET, COATED ORAL at 10:06

## 2021-06-07 RX ADMIN — HEPARIN SODIUM 5000 UNITS: 5000 INJECTION INTRAVENOUS; SUBCUTANEOUS at 04:06

## 2021-06-08 VITALS
DIASTOLIC BLOOD PRESSURE: 70 MMHG | OXYGEN SATURATION: 96 % | TEMPERATURE: 97 F | HEART RATE: 77 BPM | RESPIRATION RATE: 18 BRPM | SYSTOLIC BLOOD PRESSURE: 133 MMHG | HEIGHT: 68 IN | BODY MASS INDEX: 36.92 KG/M2 | WEIGHT: 243.63 LBS

## 2021-06-08 PROBLEM — I24.9 ACS (ACUTE CORONARY SYNDROME): Status: RESOLVED | Noted: 2021-06-06 | Resolved: 2021-06-08

## 2021-06-08 LAB
ANION GAP SERPL CALCULATED.3IONS-SCNC: 14 MMOL/L (ref 7–16)
BASOPHILS # BLD AUTO: 0.05 K/UL (ref 0–0.2)
BASOPHILS NFR BLD AUTO: 0.6 % (ref 0–1)
BUN SERPL-MCNC: 14 MG/DL (ref 7–18)
BUN/CREAT SERPL: 14 (ref 6–20)
CALCIUM SERPL-MCNC: 8.1 MG/DL (ref 8.5–10.1)
CHLORIDE SERPL-SCNC: 107 MMOL/L (ref 98–107)
CO2 SERPL-SCNC: 28 MMOL/L (ref 21–32)
CREAT SERPL-MCNC: 1 MG/DL (ref 0.55–1.02)
DIFFERENTIAL METHOD BLD: ABNORMAL
EOSINOPHIL # BLD AUTO: 0.28 K/UL (ref 0–0.5)
EOSINOPHIL NFR BLD AUTO: 3.6 % (ref 1–4)
ERYTHROCYTE [DISTWIDTH] IN BLOOD BY AUTOMATED COUNT: 13.3 % (ref 11.5–14.5)
GLUCOSE SERPL-MCNC: 107 MG/DL (ref 70–105)
GLUCOSE SERPL-MCNC: 150 MG/DL (ref 74–106)
HCT VFR BLD AUTO: 35.8 % (ref 38–47)
HGB BLD-MCNC: 11.4 G/DL (ref 12–16)
IMM GRANULOCYTES # BLD AUTO: 0.04 K/UL (ref 0–0.04)
IMM GRANULOCYTES NFR BLD: 0.5 % (ref 0–0.4)
LYMPHOCYTES # BLD AUTO: 2.28 K/UL (ref 1–4.8)
LYMPHOCYTES NFR BLD AUTO: 29 % (ref 27–41)
MCH RBC QN AUTO: 28.8 PG (ref 27–31)
MCHC RBC AUTO-ENTMCNC: 31.8 G/DL (ref 32–36)
MCV RBC AUTO: 90.4 FL (ref 80–96)
MONOCYTES # BLD AUTO: 0.64 K/UL (ref 0–0.8)
MONOCYTES NFR BLD AUTO: 8.1 % (ref 2–6)
MPC BLD CALC-MCNC: 10.6 FL (ref 9.4–12.4)
NEUTROPHILS # BLD AUTO: 4.58 K/UL (ref 1.8–7.7)
NEUTROPHILS NFR BLD AUTO: 58.2 % (ref 53–65)
NRBC # BLD AUTO: 0 X10E3/UL
NRBC, AUTO (.00): 0 %
NT-PROBNP SERPL-MCNC: 84 PG/ML (ref 1–125)
PLATELET # BLD AUTO: 252 K/UL (ref 150–400)
POTASSIUM SERPL-SCNC: 4.3 MMOL/L (ref 3.5–5.1)
RBC # BLD AUTO: 3.96 M/UL (ref 4.2–5.4)
SODIUM SERPL-SCNC: 145 MMOL/L (ref 136–145)
WBC # BLD AUTO: 7.87 K/UL (ref 4.5–11)

## 2021-06-08 PROCEDURE — 83880 ASSAY OF NATRIURETIC PEPTIDE: CPT | Performed by: NURSE PRACTITIONER

## 2021-06-08 PROCEDURE — 82962 GLUCOSE BLOOD TEST: CPT

## 2021-06-08 PROCEDURE — 25000003 PHARM REV CODE 250: Performed by: NURSE PRACTITIONER

## 2021-06-08 PROCEDURE — 94761 N-INVAS EAR/PLS OXIMETRY MLT: CPT

## 2021-06-08 PROCEDURE — 80048 BASIC METABOLIC PNL TOTAL CA: CPT | Performed by: NURSE PRACTITIONER

## 2021-06-08 PROCEDURE — 63600175 PHARM REV CODE 636 W HCPCS: Performed by: NURSE PRACTITIONER

## 2021-06-08 PROCEDURE — 25000003 PHARM REV CODE 250: Performed by: FAMILY MEDICINE

## 2021-06-08 PROCEDURE — 36415 COLL VENOUS BLD VENIPUNCTURE: CPT | Performed by: NURSE PRACTITIONER

## 2021-06-08 PROCEDURE — 85025 COMPLETE CBC W/AUTO DIFF WBC: CPT | Performed by: NURSE PRACTITIONER

## 2021-06-08 PROCEDURE — 99239 HOSP IP/OBS DSCHRG MGMT >30: CPT | Mod: ,,, | Performed by: HOSPITALIST

## 2021-06-08 PROCEDURE — 25000003 PHARM REV CODE 250: Performed by: STUDENT IN AN ORGANIZED HEALTH CARE EDUCATION/TRAINING PROGRAM

## 2021-06-08 PROCEDURE — 99239 PR HOSPITAL DISCHARGE DAY,>30 MIN: ICD-10-PCS | Mod: ,,, | Performed by: HOSPITALIST

## 2021-06-08 RX ORDER — ATORVASTATIN CALCIUM 40 MG/1
40 TABLET, FILM COATED ORAL NIGHTLY
Qty: 30 TABLET | Refills: 2 | Status: SHIPPED | OUTPATIENT
Start: 2021-06-08 | End: 2021-06-21

## 2021-06-08 RX ORDER — ISOSORBIDE MONONITRATE 30 MG/1
30 TABLET, EXTENDED RELEASE ORAL DAILY
Qty: 30 TABLET | Refills: 2 | Status: SHIPPED | OUTPATIENT
Start: 2021-06-09 | End: 2021-06-08

## 2021-06-08 RX ORDER — ISOSORBIDE MONONITRATE 30 MG/1
30 TABLET, EXTENDED RELEASE ORAL DAILY
Qty: 30 TABLET | Refills: 2 | Status: SHIPPED | OUTPATIENT
Start: 2021-06-09 | End: 2021-06-21

## 2021-06-08 RX ORDER — ATORVASTATIN CALCIUM 40 MG/1
40 TABLET, FILM COATED ORAL NIGHTLY
Qty: 30 TABLET | Refills: 2 | Status: SHIPPED | OUTPATIENT
Start: 2021-06-08 | End: 2021-06-08

## 2021-06-08 RX ADMIN — ACETAMINOPHEN 650 MG: 325 TABLET ORAL at 10:06

## 2021-06-08 RX ADMIN — ACETAMINOPHEN 650 MG: 325 TABLET ORAL at 12:06

## 2021-06-08 RX ADMIN — ASPIRIN 81 MG: 81 TABLET, COATED ORAL at 08:06

## 2021-06-08 RX ADMIN — HEPARIN SODIUM 5000 UNITS: 5000 INJECTION INTRAVENOUS; SUBCUTANEOUS at 02:06

## 2021-06-08 RX ADMIN — CLOPIDOGREL 75 MG: 75 TABLET, FILM COATED ORAL at 08:06

## 2021-06-08 RX ADMIN — HEPARIN SODIUM 5000 UNITS: 5000 INJECTION INTRAVENOUS; SUBCUTANEOUS at 05:06

## 2021-06-09 ENCOUNTER — TELEPHONE (OUTPATIENT)
Dept: FAMILY MEDICINE | Facility: CLINIC | Age: 71
End: 2021-06-09

## 2021-06-17 DIAGNOSIS — G47.30 SLEEP APNEA, UNSPECIFIED: Primary | ICD-10-CM

## 2021-06-20 ENCOUNTER — NURSE TRIAGE (OUTPATIENT)
Dept: ADMINISTRATIVE | Facility: CLINIC | Age: 71
End: 2021-06-20

## 2021-06-21 ENCOUNTER — OFFICE VISIT (OUTPATIENT)
Dept: FAMILY MEDICINE | Facility: CLINIC | Age: 71
End: 2021-06-21
Payer: MEDICARE

## 2021-06-21 VITALS
RESPIRATION RATE: 20 BRPM | OXYGEN SATURATION: 97 % | HEART RATE: 74 BPM | TEMPERATURE: 98 F | DIASTOLIC BLOOD PRESSURE: 74 MMHG | SYSTOLIC BLOOD PRESSURE: 138 MMHG | HEIGHT: 68 IN | BODY MASS INDEX: 37.04 KG/M2

## 2021-06-21 DIAGNOSIS — E03.9 HYPOTHYROIDISM, UNSPECIFIED TYPE: ICD-10-CM

## 2021-06-21 DIAGNOSIS — I25.110 ATHEROSCLEROSIS OF NATIVE CORONARY ARTERY OF NATIVE HEART WITH UNSTABLE ANGINA PECTORIS: ICD-10-CM

## 2021-06-21 DIAGNOSIS — I10 ESSENTIAL HYPERTENSION, BENIGN: ICD-10-CM

## 2021-06-21 DIAGNOSIS — F32.89 OTHER DEPRESSION: Primary | ICD-10-CM

## 2021-06-21 PROCEDURE — 99495 TCM SERVICES (MODERATE COMPLEXITY): ICD-10-PCS | Mod: ,,, | Performed by: NURSE PRACTITIONER

## 2021-06-21 PROCEDURE — 99495 TRANSJ CARE MGMT MOD F2F 14D: CPT | Mod: ,,, | Performed by: NURSE PRACTITIONER

## 2021-06-21 RX ORDER — BUPROPION HYDROCHLORIDE 150 MG/1
150 TABLET ORAL DAILY
Qty: 30 TABLET | Refills: 1 | Status: SHIPPED | OUTPATIENT
Start: 2021-06-21 | End: 2022-02-08

## 2021-06-28 ENCOUNTER — OFFICE VISIT (OUTPATIENT)
Dept: CARDIOLOGY | Facility: CLINIC | Age: 71
End: 2021-06-28
Payer: MEDICARE

## 2021-06-28 VITALS
OXYGEN SATURATION: 95 % | BODY MASS INDEX: 35.31 KG/M2 | HEART RATE: 76 BPM | WEIGHT: 233 LBS | SYSTOLIC BLOOD PRESSURE: 124 MMHG | HEIGHT: 68 IN | DIASTOLIC BLOOD PRESSURE: 80 MMHG

## 2021-06-28 DIAGNOSIS — R06.02 SHORTNESS OF BREATH: ICD-10-CM

## 2021-06-28 DIAGNOSIS — I25.110 ATHEROSCLEROSIS OF NATIVE CORONARY ARTERY OF NATIVE HEART WITH UNSTABLE ANGINA PECTORIS: Primary | ICD-10-CM

## 2021-06-28 PROCEDURE — 99214 OFFICE O/P EST MOD 30 MIN: CPT | Mod: PBBFAC | Performed by: INTERNAL MEDICINE

## 2021-06-28 PROCEDURE — 99213 PR OFFICE/OUTPT VISIT, EST, LEVL III, 20-29 MIN: ICD-10-PCS | Mod: S$PBB,,, | Performed by: INTERNAL MEDICINE

## 2021-06-28 PROCEDURE — 99213 OFFICE O/P EST LOW 20 MIN: CPT | Mod: S$PBB,,, | Performed by: INTERNAL MEDICINE

## 2021-08-08 ENCOUNTER — PROCEDURE VISIT (OUTPATIENT)
Dept: SLEEP MEDICINE | Facility: HOSPITAL | Age: 71
End: 2021-08-08
Attending: INTERNAL MEDICINE
Payer: MEDICARE

## 2021-08-08 DIAGNOSIS — G47.30 SLEEP APNEA, UNSPECIFIED: ICD-10-CM

## 2021-08-08 PROCEDURE — 95810 POLYSOM 6/> YRS 4/> PARAM: CPT | Mod: PO

## 2021-08-09 DIAGNOSIS — G47.33 OSA (OBSTRUCTIVE SLEEP APNEA): Primary | ICD-10-CM

## 2021-08-25 ENCOUNTER — OFFICE VISIT (OUTPATIENT)
Dept: FAMILY MEDICINE | Facility: CLINIC | Age: 71
End: 2021-08-25
Payer: MEDICARE

## 2021-08-25 VITALS
RESPIRATION RATE: 18 BRPM | DIASTOLIC BLOOD PRESSURE: 73 MMHG | BODY MASS INDEX: 35.31 KG/M2 | HEART RATE: 90 BPM | SYSTOLIC BLOOD PRESSURE: 130 MMHG | TEMPERATURE: 98 F | HEIGHT: 68 IN | OXYGEN SATURATION: 96 % | WEIGHT: 233 LBS

## 2021-08-25 DIAGNOSIS — F32.89 OTHER DEPRESSION: ICD-10-CM

## 2021-08-25 DIAGNOSIS — F33.1 MODERATE EPISODE OF RECURRENT MAJOR DEPRESSIVE DISORDER: Primary | ICD-10-CM

## 2021-08-25 PROCEDURE — 99213 OFFICE O/P EST LOW 20 MIN: CPT | Mod: ,,, | Performed by: NURSE PRACTITIONER

## 2021-08-25 PROCEDURE — 99213 PR OFFICE/OUTPT VISIT, EST, LEVL III, 20-29 MIN: ICD-10-PCS | Mod: ,,, | Performed by: NURSE PRACTITIONER

## 2021-08-25 RX ORDER — BUPROPION HYDROCHLORIDE 150 MG/1
150 TABLET ORAL DAILY
Qty: 30 TABLET | Refills: 1 | Status: CANCELLED | OUTPATIENT
Start: 2021-08-25 | End: 2022-08-25

## 2021-08-25 RX ORDER — BUPROPION HYDROCHLORIDE 300 MG/1
300 TABLET ORAL DAILY
Qty: 90 TABLET | Refills: 1 | Status: SHIPPED | OUTPATIENT
Start: 2021-08-25 | End: 2022-02-08

## 2021-11-25 ENCOUNTER — HOSPITAL ENCOUNTER (EMERGENCY)
Facility: HOSPITAL | Age: 71
Discharge: HOME | End: 2021-11-25
Payer: MEDICARE

## 2021-11-25 VITALS
RESPIRATION RATE: 24 BRPM | WEIGHT: 230 LBS | SYSTOLIC BLOOD PRESSURE: 131 MMHG | OXYGEN SATURATION: 97 % | TEMPERATURE: 99 F | HEIGHT: 68 IN | DIASTOLIC BLOOD PRESSURE: 95 MMHG | BODY MASS INDEX: 34.86 KG/M2 | HEART RATE: 80 BPM

## 2021-11-25 DIAGNOSIS — U07.1 COVID-19: Primary | ICD-10-CM

## 2021-11-25 DIAGNOSIS — R07.9 CHEST PAIN: ICD-10-CM

## 2021-11-25 DIAGNOSIS — R07.9 CHEST PAIN IN ADULT: ICD-10-CM

## 2021-11-25 LAB
ALBUMIN SERPL BCP-MCNC: 3.5 G/DL (ref 3.5–5)
ALBUMIN/GLOB SERPL: 1 {RATIO}
ALP SERPL-CCNC: 83 U/L (ref 55–142)
ALT SERPL W P-5'-P-CCNC: 25 U/L (ref 13–56)
ANION GAP SERPL CALCULATED.3IONS-SCNC: 12 MMOL/L (ref 7–16)
APTT PPP: 25.5 SECONDS (ref 25.2–37.3)
AST SERPL W P-5'-P-CCNC: 20 U/L (ref 15–37)
BASOPHILS # BLD AUTO: 0.03 K/UL (ref 0–0.2)
BASOPHILS NFR BLD AUTO: 0.7 % (ref 0–1)
BILIRUB SERPL-MCNC: 0.2 MG/DL (ref 0–1.2)
BUN SERPL-MCNC: 12 MG/DL (ref 7–18)
BUN/CREAT SERPL: 13 (ref 6–20)
CALCIUM SERPL-MCNC: 8.5 MG/DL (ref 8.5–10.1)
CHLORIDE SERPL-SCNC: 106 MMOL/L (ref 98–107)
CO2 SERPL-SCNC: 25 MMOL/L (ref 21–32)
CREAT SERPL-MCNC: 0.93 MG/DL (ref 0.55–1.02)
DIFFERENTIAL METHOD BLD: ABNORMAL
EOSINOPHIL # BLD AUTO: 0.14 K/UL (ref 0–0.5)
EOSINOPHIL NFR BLD AUTO: 3.1 % (ref 1–4)
ERYTHROCYTE [DISTWIDTH] IN BLOOD BY AUTOMATED COUNT: 14.2 % (ref 11.5–14.5)
FLUAV AG UPPER RESP QL IA.RAPID: NEGATIVE
FLUBV AG UPPER RESP QL IA.RAPID: NEGATIVE
GLOBULIN SER-MCNC: 3.4 G/DL (ref 2–4)
GLUCOSE SERPL-MCNC: 115 MG/DL (ref 74–106)
HCT VFR BLD AUTO: 39 % (ref 38–47)
HGB BLD-MCNC: 12.9 G/DL (ref 12–16)
INR BLD: 0.93 (ref 0.9–1.1)
LYMPHOCYTES # BLD AUTO: 0.84 K/UL (ref 1–4.8)
LYMPHOCYTES NFR BLD AUTO: 18.9 % (ref 27–41)
MAGNESIUM SERPL-MCNC: 2 MG/DL (ref 1.7–2.3)
MCH RBC QN AUTO: 29.8 PG (ref 27–31)
MCHC RBC AUTO-ENTMCNC: 33.1 G/DL (ref 32–36)
MCV RBC AUTO: 90.1 FL (ref 80–96)
MONOCYTES # BLD AUTO: 0.74 K/UL (ref 0–0.8)
MONOCYTES NFR BLD AUTO: 16.6 % (ref 2–6)
MPC BLD CALC-MCNC: 10.5 FL (ref 9.4–12.4)
NEUTROPHILS # BLD AUTO: 2.7 K/UL (ref 1.8–7.7)
NEUTROPHILS NFR BLD AUTO: 60.7 % (ref 53–65)
PLATELET # BLD AUTO: 242 K/UL (ref 150–400)
POTASSIUM SERPL-SCNC: 4.2 MMOL/L (ref 3.5–5.1)
PROT SERPL-MCNC: 6.9 G/DL (ref 6.4–8.2)
PROTHROMBIN TIME: 12.4 SECONDS (ref 11.7–14.7)
RBC # BLD AUTO: 4.33 M/UL (ref 4.2–5.4)
SARS-COV+SARS-COV-2 AG RESP QL IA.RAPID: POSITIVE
SODIUM SERPL-SCNC: 139 MMOL/L (ref 136–145)
TROPONIN I SERPL HS-MCNC: <4 PG/ML
WBC # BLD AUTO: 4.45 K/UL (ref 4.5–11)

## 2021-11-25 PROCEDURE — 80053 COMPREHEN METABOLIC PANEL: CPT | Performed by: NURSE PRACTITIONER

## 2021-11-25 PROCEDURE — 87428 SARSCOV & INF VIR A&B AG IA: CPT | Performed by: NURSE PRACTITIONER

## 2021-11-25 PROCEDURE — 84484 ASSAY OF TROPONIN QUANT: CPT | Performed by: NURSE PRACTITIONER

## 2021-11-25 PROCEDURE — 99284 EMERGENCY DEPT VISIT MOD MDM: CPT | Mod: 25

## 2021-11-25 PROCEDURE — 25000003 PHARM REV CODE 250: Performed by: NURSE PRACTITIONER

## 2021-11-25 PROCEDURE — M0243 CASIRIVI AND IMDEVI INFUSION: HCPCS | Performed by: NURSE PRACTITIONER

## 2021-11-25 PROCEDURE — 85730 THROMBOPLASTIN TIME PARTIAL: CPT | Performed by: NURSE PRACTITIONER

## 2021-11-25 PROCEDURE — 36415 COLL VENOUS BLD VENIPUNCTURE: CPT | Performed by: NURSE PRACTITIONER

## 2021-11-25 PROCEDURE — 63600175 PHARM REV CODE 636 W HCPCS: Performed by: NURSE PRACTITIONER

## 2021-11-25 PROCEDURE — 83735 ASSAY OF MAGNESIUM: CPT | Performed by: NURSE PRACTITIONER

## 2021-11-25 PROCEDURE — 85025 COMPLETE CBC W/AUTO DIFF WBC: CPT | Performed by: NURSE PRACTITIONER

## 2021-11-25 PROCEDURE — 93010 ELECTROCARDIOGRAM REPORT: CPT | Performed by: FAMILY MEDICINE

## 2021-11-25 PROCEDURE — 99283 EMERGENCY DEPT VISIT LOW MDM: CPT | Mod: GF | Performed by: NURSE PRACTITIONER

## 2021-11-25 PROCEDURE — 85610 PROTHROMBIN TIME: CPT | Performed by: NURSE PRACTITIONER

## 2021-11-25 PROCEDURE — 93005 ELECTROCARDIOGRAM TRACING: CPT

## 2021-11-25 RX ORDER — ALBUTEROL SULFATE 90 UG/1
1-2 AEROSOL, METERED RESPIRATORY (INHALATION) EVERY 4 HOURS PRN
Qty: 1 G | Refills: 0 | Status: SHIPPED | OUTPATIENT
Start: 2021-11-25 | End: 2022-02-08

## 2021-11-25 RX ORDER — SODIUM CHLORIDE 0.9 % (FLUSH) 0.9 %
10 SYRINGE (ML) INJECTION
Status: DISCONTINUED | OUTPATIENT
Start: 2021-11-25 | End: 2021-11-25 | Stop reason: HOSPADM

## 2021-11-25 RX ORDER — ZINC SULFATE 50(220)MG
220 CAPSULE ORAL DAILY
Status: DISCONTINUED | OUTPATIENT
Start: 2021-11-25 | End: 2021-11-25

## 2021-11-25 RX ORDER — DIPHENHYDRAMINE HYDROCHLORIDE 50 MG/ML
25 INJECTION INTRAMUSCULAR; INTRAVENOUS ONCE AS NEEDED
Status: DISCONTINUED | OUTPATIENT
Start: 2021-11-25 | End: 2021-11-25 | Stop reason: HOSPADM

## 2021-11-25 RX ORDER — FAMOTIDINE 20 MG/1
20 TABLET, FILM COATED ORAL 2 TIMES DAILY
Qty: 20 TABLET | Refills: 0 | Status: SHIPPED | OUTPATIENT
Start: 2021-11-25 | End: 2021-12-27 | Stop reason: HOSPADM

## 2021-11-25 RX ORDER — CHOLECALCIFEROL (VITAMIN D3) 25 MCG
1000 TABLET ORAL DAILY
Status: DISCONTINUED | OUTPATIENT
Start: 2021-11-25 | End: 2021-11-25

## 2021-11-25 RX ORDER — VIT C/E/ZN/COPPR/LUTEIN/ZEAXAN 250MG-90MG
1000 CAPSULE ORAL DAILY
Qty: 30 CAPSULE | Refills: 0 | Status: SHIPPED | OUTPATIENT
Start: 2021-11-25 | End: 2022-02-08 | Stop reason: SDUPTHER

## 2021-11-25 RX ORDER — EPINEPHRINE 0.3 MG/.3ML
0.3 INJECTION SUBCUTANEOUS
Status: DISCONTINUED | OUTPATIENT
Start: 2021-11-25 | End: 2021-11-25 | Stop reason: HOSPADM

## 2021-11-25 RX ORDER — ALBUTEROL SULFATE 90 UG/1
2 AEROSOL, METERED RESPIRATORY (INHALATION)
Status: DISCONTINUED | OUTPATIENT
Start: 2021-11-25 | End: 2021-11-25 | Stop reason: HOSPADM

## 2021-11-25 RX ORDER — ACETAMINOPHEN 325 MG/1
650 TABLET ORAL ONCE AS NEEDED
Status: DISCONTINUED | OUTPATIENT
Start: 2021-11-25 | End: 2021-11-25 | Stop reason: HOSPADM

## 2021-11-25 RX ORDER — CETIRIZINE HYDROCHLORIDE 10 MG/1
10 TABLET ORAL DAILY
Qty: 30 TABLET | Refills: 0 | Status: SHIPPED | OUTPATIENT
Start: 2021-11-25 | End: 2022-02-08

## 2021-11-25 RX ORDER — DEXAMETHASONE 6 MG/1
6 TABLET ORAL
Qty: 10 TABLET | Refills: 0 | Status: SHIPPED | OUTPATIENT
Start: 2021-11-25 | End: 2021-12-05

## 2021-11-25 RX ORDER — NAPROXEN SODIUM 220 MG/1
81 TABLET, FILM COATED ORAL DAILY
Qty: 30 TABLET | Refills: 0 | Status: SHIPPED | OUTPATIENT
Start: 2021-11-25 | End: 2022-02-08 | Stop reason: SDUPTHER

## 2021-11-25 RX ORDER — ZINC SULFATE 50(220)MG
220 CAPSULE ORAL DAILY
Qty: 30 CAPSULE | Refills: 0 | Status: SHIPPED | OUTPATIENT
Start: 2021-11-25 | End: 2022-02-08

## 2021-11-25 RX ORDER — ONDANSETRON 4 MG/1
4 TABLET, ORALLY DISINTEGRATING ORAL ONCE AS NEEDED
Status: DISCONTINUED | OUTPATIENT
Start: 2021-11-25 | End: 2021-11-25 | Stop reason: HOSPADM

## 2021-11-25 RX ORDER — MULTIVIT-MIN/IRON/FOLIC ACID/K 18-600-40
1 CAPSULE ORAL DAILY
Qty: 30 CAPSULE | Refills: 2 | Status: SHIPPED | OUTPATIENT
Start: 2021-11-25 | End: 2022-02-08

## 2021-11-25 RX ADMIN — CASIRIVIMAB AND IMDEVIMAB 600 MG: 600; 600 INJECTION, SOLUTION, CONCENTRATE INTRAVENOUS at 10:11

## 2021-11-29 ENCOUNTER — TELEPHONE (OUTPATIENT)
Dept: EMERGENCY MEDICINE | Facility: HOSPITAL | Age: 71
End: 2021-11-29
Payer: MEDICARE

## 2021-12-16 DIAGNOSIS — R13.10 DYSPHAGIA: Primary | ICD-10-CM

## 2021-12-16 DIAGNOSIS — Z01.818 PRE-OP TESTING: ICD-10-CM

## 2021-12-24 ENCOUNTER — LAB VISIT (OUTPATIENT)
Dept: LAB | Facility: HOSPITAL | Age: 71
End: 2021-12-24
Attending: INTERNAL MEDICINE
Payer: MEDICARE

## 2021-12-24 DIAGNOSIS — Z01.818 PRE-OP TESTING: ICD-10-CM

## 2021-12-24 PROCEDURE — 87635 SARS-COV-2 COVID-19 AMP PRB: CPT

## 2021-12-27 ENCOUNTER — HOSPITAL ENCOUNTER (OUTPATIENT)
Dept: GASTROENTEROLOGY | Facility: HOSPITAL | Age: 71
Discharge: HOME | End: 2021-12-27
Attending: INTERNAL MEDICINE
Payer: MEDICARE

## 2021-12-27 ENCOUNTER — ANESTHESIA EVENT (OUTPATIENT)
Dept: GASTROENTEROLOGY | Facility: HOSPITAL | Age: 71
End: 2021-12-27
Payer: MEDICARE

## 2021-12-27 ENCOUNTER — ANESTHESIA (OUTPATIENT)
Dept: GASTROENTEROLOGY | Facility: HOSPITAL | Age: 71
End: 2021-12-27
Payer: MEDICARE

## 2021-12-27 VITALS
WEIGHT: 230 LBS | HEART RATE: 77 BPM | HEIGHT: 68 IN | RESPIRATION RATE: 15 BRPM | BODY MASS INDEX: 34.86 KG/M2 | OXYGEN SATURATION: 93 % | SYSTOLIC BLOOD PRESSURE: 115 MMHG | DIASTOLIC BLOOD PRESSURE: 97 MMHG | TEMPERATURE: 97 F

## 2021-12-27 DIAGNOSIS — K20.90 ESOPHAGITIS: ICD-10-CM

## 2021-12-27 DIAGNOSIS — Z11.59 SCREENING FOR VIRAL DISEASE: Primary | ICD-10-CM

## 2021-12-27 DIAGNOSIS — R13.19 ESOPHAGEAL DYSPHAGIA: ICD-10-CM

## 2021-12-27 DIAGNOSIS — K44.9 HH (HIATUS HERNIA): ICD-10-CM

## 2021-12-27 DIAGNOSIS — R13.10 DYSPHAGIA: ICD-10-CM

## 2021-12-27 LAB — SARS-COV-2 RNA RESP QL NAA+PROBE: NEGATIVE

## 2021-12-27 PROCEDURE — 88312 SURGICAL PATHOLOGY: ICD-10-PCS | Mod: 26,,, | Performed by: PATHOLOGY

## 2021-12-27 PROCEDURE — 88342 SURGICAL PATHOLOGY: ICD-10-PCS | Mod: 26,,, | Performed by: PATHOLOGY

## 2021-12-27 PROCEDURE — 88342 IMHCHEM/IMCYTCHM 1ST ANTB: CPT | Mod: 26,,, | Performed by: PATHOLOGY

## 2021-12-27 PROCEDURE — 88305 TISSUE EXAM BY PATHOLOGIST: CPT | Mod: 26,XU,, | Performed by: PATHOLOGY

## 2021-12-27 PROCEDURE — D9220A PRA ANESTHESIA: ICD-10-PCS | Mod: ,,, | Performed by: NURSE ANESTHETIST, CERTIFIED REGISTERED

## 2021-12-27 PROCEDURE — D9220A PRA ANESTHESIA: Mod: ,,, | Performed by: NURSE ANESTHETIST, CERTIFIED REGISTERED

## 2021-12-27 PROCEDURE — 25000003 PHARM REV CODE 250: Performed by: NURSE ANESTHETIST, CERTIFIED REGISTERED

## 2021-12-27 PROCEDURE — 88305 SURGICAL PATHOLOGY: ICD-10-PCS | Mod: 26,,, | Performed by: PATHOLOGY

## 2021-12-27 PROCEDURE — 88312 SPECIAL STAINS GROUP 1: CPT | Mod: 26,,, | Performed by: PATHOLOGY

## 2021-12-27 PROCEDURE — 37000008 HC ANESTHESIA 1ST 15 MINUTES

## 2021-12-27 PROCEDURE — 27000284 HC CANNULA NASAL: Performed by: NURSE ANESTHETIST, CERTIFIED REGISTERED

## 2021-12-27 PROCEDURE — C1889 IMPLANT/INSERT DEVICE, NOC: HCPCS

## 2021-12-27 PROCEDURE — 63600175 PHARM REV CODE 636 W HCPCS: Performed by: NURSE ANESTHETIST, CERTIFIED REGISTERED

## 2021-12-27 PROCEDURE — 88312 SPECIAL STAINS GROUP 1: CPT | Mod: SUR | Performed by: INTERNAL MEDICINE

## 2021-12-27 PROCEDURE — 43239 EGD BIOPSY SINGLE/MULTIPLE: CPT

## 2021-12-27 PROCEDURE — 27201423 OPTIME MED/SURG SUP & DEVICES STERILE SUPPLY

## 2021-12-27 RX ORDER — PANTOPRAZOLE SODIUM 40 MG/1
40 TABLET, DELAYED RELEASE ORAL DAILY
Qty: 30 TABLET | Refills: 11 | Status: SHIPPED | OUTPATIENT
Start: 2021-12-27 | End: 2023-08-17

## 2021-12-27 RX ORDER — LIDOCAINE HYDROCHLORIDE 20 MG/ML
INJECTION, SOLUTION EPIDURAL; INFILTRATION; INTRACAUDAL; PERINEURAL
Status: DISCONTINUED | OUTPATIENT
Start: 2021-12-27 | End: 2021-12-27

## 2021-12-27 RX ORDER — PROPOFOL 10 MG/ML
INJECTION, EMULSION INTRAVENOUS
Status: DISCONTINUED | OUTPATIENT
Start: 2021-12-27 | End: 2021-12-27

## 2021-12-27 RX ORDER — SODIUM CHLORIDE 0.9 % (FLUSH) 0.9 %
10 SYRINGE (ML) INJECTION
Status: DISCONTINUED | OUTPATIENT
Start: 2021-12-27 | End: 2021-12-28 | Stop reason: HOSPADM

## 2021-12-27 RX ORDER — FENTANYL CITRATE 50 UG/ML
INJECTION, SOLUTION INTRAMUSCULAR; INTRAVENOUS
Status: DISCONTINUED | OUTPATIENT
Start: 2021-12-27 | End: 2021-12-27

## 2021-12-27 RX ADMIN — LIDOCAINE HYDROCHLORIDE 100 MG: 20 INJECTION, SOLUTION INTRAVENOUS at 11:12

## 2021-12-27 RX ADMIN — PROPOFOL 50 MG: 10 INJECTION, EMULSION INTRAVENOUS at 11:12

## 2021-12-27 RX ADMIN — SODIUM CHLORIDE: 9 INJECTION, SOLUTION INTRAVENOUS at 11:12

## 2021-12-27 RX ADMIN — FENTANYL CITRATE 100 MCG: 50 INJECTION INTRAMUSCULAR; INTRAVENOUS at 11:12

## 2021-12-28 ENCOUNTER — OFFICE VISIT (OUTPATIENT)
Dept: CARDIOLOGY | Facility: CLINIC | Age: 71
End: 2021-12-28
Payer: MEDICARE

## 2021-12-28 VITALS
OXYGEN SATURATION: 96 % | HEART RATE: 98 BPM | WEIGHT: 237 LBS | SYSTOLIC BLOOD PRESSURE: 126 MMHG | DIASTOLIC BLOOD PRESSURE: 80 MMHG | BODY MASS INDEX: 35.92 KG/M2 | HEIGHT: 68 IN

## 2021-12-28 DIAGNOSIS — I25.110 ATHEROSCLEROSIS OF NATIVE CORONARY ARTERY OF NATIVE HEART WITH UNSTABLE ANGINA PECTORIS: Primary | ICD-10-CM

## 2021-12-28 DIAGNOSIS — R06.02 SHORTNESS OF BREATH: ICD-10-CM

## 2021-12-28 LAB
DHEA SERPL-MCNC: NORMAL
ESTROGEN SERPL-MCNC: NORMAL PG/ML
LAB AP GROSS DESCRIPTION: NORMAL
LAB AP LABORATORY NOTES: NORMAL
T3RU NFR SERPL: NORMAL %

## 2021-12-28 PROCEDURE — 99214 OFFICE O/P EST MOD 30 MIN: CPT | Mod: S$PBB,,, | Performed by: INTERNAL MEDICINE

## 2021-12-28 PROCEDURE — 99214 OFFICE O/P EST MOD 30 MIN: CPT | Mod: PBBFAC | Performed by: INTERNAL MEDICINE

## 2021-12-28 PROCEDURE — 99214 PR OFFICE/OUTPT VISIT, EST, LEVL IV, 30-39 MIN: ICD-10-PCS | Mod: S$PBB,,, | Performed by: INTERNAL MEDICINE

## 2021-12-28 RX ORDER — ISOSORBIDE MONONITRATE 30 MG/1
30 TABLET, EXTENDED RELEASE ORAL DAILY
Qty: 30 TABLET | Refills: 11 | Status: SHIPPED | OUTPATIENT
Start: 2021-12-28 | End: 2022-02-08

## 2021-12-29 ENCOUNTER — TELEPHONE (OUTPATIENT)
Dept: PULMONOLOGY | Facility: CLINIC | Age: 71
End: 2021-12-29
Payer: MEDICARE

## 2021-12-29 ENCOUNTER — TELEPHONE (OUTPATIENT)
Dept: CARDIOLOGY | Facility: CLINIC | Age: 71
End: 2021-12-29
Payer: MEDICARE

## 2022-01-24 ENCOUNTER — OFFICE VISIT (OUTPATIENT)
Dept: PULMONOLOGY | Facility: CLINIC | Age: 72
End: 2022-01-24
Payer: MEDICARE

## 2022-01-24 VITALS
RESPIRATION RATE: 16 BRPM | WEIGHT: 237 LBS | HEART RATE: 72 BPM | DIASTOLIC BLOOD PRESSURE: 74 MMHG | HEIGHT: 68 IN | BODY MASS INDEX: 35.92 KG/M2 | SYSTOLIC BLOOD PRESSURE: 150 MMHG | OXYGEN SATURATION: 97 %

## 2022-01-24 DIAGNOSIS — R06.02 SHORTNESS OF BREATH: Primary | ICD-10-CM

## 2022-01-24 DIAGNOSIS — R07.9 CHEST PAIN, UNSPECIFIED TYPE: ICD-10-CM

## 2022-01-24 PROCEDURE — 99204 OFFICE O/P NEW MOD 45 MIN: CPT | Mod: S$PBB,,, | Performed by: INTERNAL MEDICINE

## 2022-01-24 PROCEDURE — 99215 OFFICE O/P EST HI 40 MIN: CPT | Mod: PBBFAC | Performed by: INTERNAL MEDICINE

## 2022-01-24 PROCEDURE — 99204 PR OFFICE/OUTPT VISIT, NEW, LEVL IV, 45-59 MIN: ICD-10-PCS | Mod: S$PBB,,, | Performed by: INTERNAL MEDICINE

## 2022-01-24 RX ORDER — ALBUTEROL SULFATE 90 UG/1
2 AEROSOL, METERED RESPIRATORY (INHALATION) EVERY 6 HOURS PRN
Qty: 18 G | Refills: 5 | Status: SHIPPED | OUTPATIENT
Start: 2022-01-24

## 2022-01-24 RX ORDER — BUDESONIDE AND FORMOTEROL FUMARATE DIHYDRATE 160; 4.5 UG/1; UG/1
2 AEROSOL RESPIRATORY (INHALATION) EVERY 12 HOURS
Qty: 10.2 G | Refills: 5 | Status: SHIPPED | OUTPATIENT
Start: 2022-01-24 | End: 2022-02-23

## 2022-01-25 ENCOUNTER — TELEPHONE (OUTPATIENT)
Dept: PULMONOLOGY | Facility: CLINIC | Age: 72
End: 2022-01-25
Payer: MEDICARE

## 2022-01-25 NOTE — ASSESSMENT & PLAN NOTE
Patient presents today for evaluation of shortness of breath been going on for some time worse over the last several weeks she has been on extended plane ride has a history of pulmonary embolism in the past.  Patient denies any fever or chills.  Patient has never smoked.  My plan is to rule out emboli start on bronchodilators and get very pulmonary function test

## 2022-01-26 NOTE — TELEPHONE ENCOUNTER
Pt phoned and left message that inhaler was > $400.00  (quoted Budesonide: script was Symbicort).    Reported filled Albuterol inhaler and requested change of med for other inhaler.    Unable to reach pt at number she left 1/25/22  and message was left on [phone] number   that return call will be tried again tomorrow.//gp    On return call to patient:  She confirmed that we had shared the names of similar med inhalers that her med plan may cover.    2/7/22   was phioned today and she reported that she has not had time to follow-up on this.    Reported has had some SOB at times.   was encourage to contact her med plan    And ask how best to order med inhaler (one combination inhaler or two separate inhalers) that may help her breathing.  She agreed to try to call prior to next appointment.//gp

## 2022-01-28 ENCOUNTER — HOSPITAL ENCOUNTER (OUTPATIENT)
Dept: RADIOLOGY | Facility: HOSPITAL | Age: 72
Discharge: HOME | End: 2022-01-28
Attending: INTERNAL MEDICINE
Payer: MEDICARE

## 2022-01-28 DIAGNOSIS — R07.9 CHEST PAIN, UNSPECIFIED TYPE: ICD-10-CM

## 2022-01-28 DIAGNOSIS — R06.02 SHORTNESS OF BREATH: ICD-10-CM

## 2022-01-28 LAB — CREAT SERPL-MCNC: 0.8 MG/DL (ref 0.6–1.3)

## 2022-01-28 PROCEDURE — 93970 EXTREMITY STUDY: CPT | Mod: 26,,, | Performed by: RADIOLOGY

## 2022-01-28 PROCEDURE — 71275 CT ANGIOGRAPHY CHEST: CPT | Mod: TC

## 2022-01-28 PROCEDURE — 93970 EXTREMITY STUDY: CPT | Mod: TC

## 2022-01-28 PROCEDURE — 71275 CT ANGIOGRAPHY CHEST: CPT | Mod: 26,,, | Performed by: RADIOLOGY

## 2022-01-28 PROCEDURE — 93970 US LOWER EXTREMITY VEINS BILATERAL: ICD-10-PCS | Mod: 26,,, | Performed by: RADIOLOGY

## 2022-01-28 PROCEDURE — 71275 CTA CHEST NON CORONARY: ICD-10-PCS | Mod: 26,,, | Performed by: RADIOLOGY

## 2022-01-28 PROCEDURE — 25500020 PHARM REV CODE 255: Performed by: INTERNAL MEDICINE

## 2022-01-28 PROCEDURE — 82565 ASSAY OF CREATININE: CPT

## 2022-01-28 RX ADMIN — IOPAMIDOL 100 ML: 755 INJECTION, SOLUTION INTRAVENOUS at 11:01

## 2022-02-08 ENCOUNTER — OFFICE VISIT (OUTPATIENT)
Dept: FAMILY MEDICINE | Facility: CLINIC | Age: 72
End: 2022-02-08
Payer: MEDICARE

## 2022-02-08 VITALS
TEMPERATURE: 98 F | SYSTOLIC BLOOD PRESSURE: 149 MMHG | HEIGHT: 68 IN | DIASTOLIC BLOOD PRESSURE: 102 MMHG | HEART RATE: 73 BPM | WEIGHT: 236 LBS | RESPIRATION RATE: 20 BRPM | BODY MASS INDEX: 35.77 KG/M2 | OXYGEN SATURATION: 98 %

## 2022-02-08 DIAGNOSIS — R82.90 CLOUDY URINE: ICD-10-CM

## 2022-02-08 DIAGNOSIS — R82.90 ABNORMAL URINE ODOR: Primary | ICD-10-CM

## 2022-02-08 DIAGNOSIS — R73.03 PRE-DIABETES: ICD-10-CM

## 2022-02-08 DIAGNOSIS — E89.0 H/O PARTIAL THYROIDECTOMY: ICD-10-CM

## 2022-02-08 LAB
BILIRUB SERPL-MCNC: NEGATIVE MG/DL
BLOOD URINE, POC: NEGATIVE
COLOR, POC UA: YELLOW
EST. AVERAGE GLUCOSE BLD GHB EST-MCNC: 130 MG/DL
GLUCOSE UR QL STRIP: NEGATIVE
HBA1C MFR BLD HPLC: 6.5 % (ref 4.5–6.6)
KETONES UR QL STRIP: NEGATIVE
LEUKOCYTE ESTERASE URINE, POC: NEGATIVE
NITRITE, POC UA: POSITIVE
PH, POC UA: 6
PROTEIN, POC: 100
SPECIFIC GRAVITY, POC UA: 1.02
TSH SERPL DL<=0.005 MIU/L-ACNC: 1.87 UIU/ML (ref 0.36–3.74)
UROBILINOGEN, POC UA: 0.2

## 2022-02-08 PROCEDURE — 90694 FLU VACCINE - QUADRIVALENT - ADJUVANTED: ICD-10-PCS | Mod: ,,, | Performed by: NURSE PRACTITIONER

## 2022-02-08 PROCEDURE — G0008 ADMIN INFLUENZA VIRUS VAC: HCPCS | Mod: ,,, | Performed by: NURSE PRACTITIONER

## 2022-02-08 PROCEDURE — 99214 PR OFFICE/OUTPT VISIT, EST, LEVL IV, 30-39 MIN: ICD-10-PCS | Mod: ,,, | Performed by: NURSE PRACTITIONER

## 2022-02-08 PROCEDURE — G0008 FLU VACCINE - QUADRIVALENT - ADJUVANTED: ICD-10-PCS | Mod: ,,, | Performed by: NURSE PRACTITIONER

## 2022-02-08 PROCEDURE — 99214 OFFICE O/P EST MOD 30 MIN: CPT | Mod: ,,, | Performed by: NURSE PRACTITIONER

## 2022-02-08 PROCEDURE — 90694 VACC AIIV4 NO PRSRV 0.5ML IM: CPT | Mod: ,,, | Performed by: NURSE PRACTITIONER

## 2022-02-08 PROCEDURE — 83036 HEMOGLOBIN A1C: ICD-10-PCS | Mod: ,,, | Performed by: CLINICAL MEDICAL LABORATORY

## 2022-02-08 PROCEDURE — 83036 HEMOGLOBIN GLYCOSYLATED A1C: CPT | Mod: ,,, | Performed by: CLINICAL MEDICAL LABORATORY

## 2022-02-08 PROCEDURE — 84443 ASSAY THYROID STIM HORMONE: CPT | Mod: ,,, | Performed by: CLINICAL MEDICAL LABORATORY

## 2022-02-08 PROCEDURE — 81003 URINALYSIS AUTO W/O SCOPE: CPT | Mod: RHCUB | Performed by: NURSE PRACTITIONER

## 2022-02-08 PROCEDURE — 84443 TSH: ICD-10-PCS | Mod: ,,, | Performed by: CLINICAL MEDICAL LABORATORY

## 2022-02-08 RX ORDER — NITROFURANTOIN 25; 75 MG/1; MG/1
100 CAPSULE ORAL 2 TIMES DAILY
Qty: 14 CAPSULE | Refills: 0 | Status: SHIPPED | OUTPATIENT
Start: 2022-02-08 | End: 2022-08-31

## 2022-02-08 NOTE — PROGRESS NOTES
[facility] - FAMILY MEDIC[facility]  Phone: [phone]       PATIENT NAME: Marci Aguilera   : 1950    AGE: 71 y.o. DATE: 2022    MRN: [id number]        Reason for Visit / Chief Complaint:  cloudy urine, odorous urine, Insomnia, and Polydipsia     Subjective:     HPI:  71 yr old WF presents to the office for malodorus urine that is cloudy   Also wants thyroid checked - history of partial thyroidectomy   Hx of pre-dm - states feels thirsty a lot     Review of Systems:    Pertinent items are above noted in HPI.    Review of patient's allergies indicates:   Allergen Reactions    Statins-hmg-coa reductase inhibitors Other (See Comments)     Refuses to take due to causing muscle aches and joint pain    Diphtheria toxin preparations     Dristan allergy Other (See Comments)     hallucinations    Tetanus vaccines and toxoid Other (See Comments)     Arm pain and swelling    Penicillins Rash        Med List:  Current Outpatient Medications on File Prior to Visit   Medication Sig Dispense Refill    albuterol (VENTOLIN HFA) 90 mcg/actuation inhaler Inhale 2 puffs into the lungs every 6 (six) hours as needed for Wheezing. Rescue 18 g 5    aspirin (ECOTRIN) 81 MG EC tablet Take 81 mg by mouth once daily.      pantoprazole (PROTONIX) 40 MG tablet Take 1 tablet (40 mg total) by mouth once daily. 30 tablet 11    vitamin D (VITAMIN D3) 1000 units Tab Take 1,000 Units by mouth once daily.      albuterol (PROVENTIL/VENTOLIN HFA) 90 mcg/actuation inhaler Inhale 1-2 puffs into the lungs every 4 (four) hours as needed for Wheezing. Rescue 1 g 0    ascorbic acid, vitamin C, 500 mg Cap Take 1 tablet by mouth once daily. 30 capsule 2    aspirin 81 MG Chew Take 1 tablet (81 mg total) by mouth once daily. 30 tablet 0    budesonide-formoterol 160-4.5 mcg (SYMBICORT) 160-4.5 mcg/actuation HFAA Inhale 2 puffs into the lungs every 12 (twelve) hours. Controller (Patient not taking:  Reported on 2/8/2022) 10.2 g 5    buPROPion (WELLBUTRIN XL) 150 MG TB24 tablet Take 1 tablet (150 mg total) by mouth once daily. 30 tablet 1    buPROPion (WELLBUTRIN XL) 300 MG 24 hr tablet Take 1 tablet (300 mg total) by mouth once daily. 90 tablet 1    cetirizine (ZYRTEC) 10 MG tablet Take 1 tablet (10 mg total) by mouth once daily. 30 tablet 0    cholecalciferol, vitamin D3, (VITAMIN D3) 25 mcg (1,000 unit) capsule Take 1 capsule (1,000 Units total) by mouth once daily. 30 capsule 0    clopidogreL (PLAVIX) 75 mg tablet Take 75 mg by mouth once daily.      cyanocobalamin (VITAMIN B-12) 100 MCG tablet Take 100 mcg by mouth once daily.      isosorbide mononitrate (IMDUR) 30 MG 24 hr tablet Take 1 tablet (30 mg total) by mouth once daily. 30 tablet 11    ondansetron (ZOFRAN) 4 MG tablet Take 1 tablet (4 mg total) by mouth every 6 (six) hours. 12 tablet 0    promethazine (PHENERGAN) 25 MG tablet Take 1 tablet (25 mg total) by mouth every 6 (six) hours as needed for Nausea. 15 tablet 0    zinc sulfate (ZINCATE) 50 mg zinc (220 mg) capsule Take 1 capsule (220 mg total) by mouth once daily. 30 capsule 0     No current facility-administered medications on file prior to visit.       Medical/Social/Family History:  Past Medical History:   Diagnosis Date    Atrophic vaginitis 05/12/2004    Coronary artery disease     Cystitis 02/2004    Deep vein thrombosis     DVT (deep venous thrombosis)     Esophageal dysphagia 12/27/2021    Esophagitis 12/27/2021    HH (hiatus hernia) 12/27/2021    Hypertension     Pulmonary embolus     after Septic ~ 2019    Thyroid disease     Vitamin D deficiency       Social History     Tobacco Use   Smoking Status Never Smoker   Smokeless Tobacco Never Used      Social History     Substance and Sexual Activity   Alcohol Use Yes    Comment: occassional       Family History   Problem Relation Age of Onset    Pancreatic cancer Paternal Grandmother     Heart disease Father      "Heart disease Mother     Uterine cancer Sister     Heart disease Sister     Cancer Sister     Stroke Brother       Past Surgical History:   Procedure Laterality Date    CATARACT EXTRACTION      CHOLECYSTECTOMY      CORONARY STENT PLACEMENT      per  2020    FLUOROSCOPIC URODYNAMIC STUDY  10/28/2019    interpreted on 10/29/2019 by Dr. Rudolph Bocanegra;  bladder pain during filling at 320cc,  urodynamic stress incontinence at 302cc without ISD,  no evidence of OAB symtomatology    HIP REPLACEMENT ARTHROPLASTY      JOINT REPLACEMENT      LEFT HEART CATHETERIZATION N/A 6/7/2021    Procedure: Left heart cath with possible intervention;  Surgeon: Chris Dave MD;  Location: [facility] CATH LAB;  Service: Cardiology;  Laterality: N/A;    rotator cuff      TOTAL ABDOMINAL HYSTERECTOMY      Transobtruator Tape Urethral Sling procedure and limited Cystoscopy  01/29/2020    at [facility];  no complications        Objective:      Vitals:    02/08/22 0958 02/08/22 1000   BP: (!) 148/89 (!) 149/102   BP Location: Right arm Left arm   Patient Position: Sitting Sitting   BP Method: Medium (Automatic) Medium (Automatic)   Pulse: 73    Resp: 20    Temp: 97.6 °F (36.4 °C)    SpO2: 98%    Weight: 107 kg (236 lb)    Height: 5' 8" (1.727 m)      Body mass index is 35.88 kg/m².     Physical Exam:    General: well developed, well nourished    Head: normocephalic, atraumatic    Neck: supple, symmetrical, trachea midline, no adenopathy and thyroid: not enlarged, symmetric, no tenderness/mass/nodules    Respiratory: unlabored respirations, no intercostal retractions or accessory muscle use, clear to auscultation without rales or wheezes    Cardiovascular: normal rate and regular rhythm, S1 and S2 normal, no murmurs noted    Abdomen: soft, nontender    Musculoskeletal: negative; full range of motion, no tenderness, palpable spasm or pain on motion    Lymphadenopathy: No cervical or " supraclavicular adenopathy    Neurological: normal without focal findings and mental status, speech normal, alert and oriented x3    Skin: Skin color, texture, turgor normal. No rashes or lesions    Psych: no auditory or visual hallucinations, no anxiety, no depression/worrying alot       Assessment:          ICD-10-CM ICD-9-CM   1. Abnormal urine odor  R82.90 791.9   2. Cloudy urine  R82.90 791.9   3. Pre-diabetes  R73.03 790.29   4. H/O partial thyroidectomy  E89.0 246.8        Plan:       Abnormal urine odor  -     POCT URINALYSIS W/O SCOPE  -     nitrofurantoin, macrocrystal-monohydrate, (MACROBID) 100 MG capsule; Take 1 capsule (100 mg total) by mouth 2 (two) times daily.  Dispense: 14 capsule; Refill: 0    Cloudy urine  -     POCT URINALYSIS W/O SCOPE  -     nitrofurantoin, macrocrystal-monohydrate, (MACROBID) 100 MG capsule; Take 1 capsule (100 mg total) by mouth 2 (two) times daily.  Dispense: 14 capsule; Refill: 0    Pre-diabetes  -     Hemoglobin A1C; Future; Expected date: 02/08/2022    H/O partial thyroidectomy  -     TSH; Future; Expected date: 02/08/2022    Other orders  -     Influenza - Quadrivalent (Adjuvanted)        Current Outpatient Medications:     albuterol (VENTOLIN HFA) 90 mcg/actuation inhaler, Inhale 2 puffs into the lungs every 6 (six) hours as needed for Wheezing. Rescue, Disp: 18 g, Rfl: 5    aspirin (ECOTRIN) 81 MG EC tablet, Take 81 mg by mouth once daily., Disp: , Rfl:     pantoprazole (PROTONIX) 40 MG tablet, Take 1 tablet (40 mg total) by mouth once daily., Disp: 30 tablet, Rfl: 11    vitamin D (VITAMIN D3) 1000 units Tab, Take 1,000 Units by mouth once daily., Disp: , Rfl:     albuterol (PROVENTIL/VENTOLIN HFA) 90 mcg/actuation inhaler, Inhale 1-2 puffs into the lungs every 4 (four) hours as needed for Wheezing. Rescue, Disp: 1 g, Rfl: 0    ascorbic acid, vitamin C, 500 mg Cap, Take 1 tablet by mouth once daily., Disp: 30 capsule, Rfl: 2    aspirin 81 MG Chew, Take 1 tablet  (81 mg total) by mouth once daily., Disp: 30 tablet, Rfl: 0    budesonide-formoterol 160-4.5 mcg (SYMBICORT) 160-4.5 mcg/actuation HFAA, Inhale 2 puffs into the lungs every 12 (twelve) hours. Controller (Patient not taking: Reported on 2/8/2022), Disp: 10.2 g, Rfl: 5    buPROPion (WELLBUTRIN XL) 150 MG TB24 tablet, Take 1 tablet (150 mg total) by mouth once daily., Disp: 30 tablet, Rfl: 1    buPROPion (WELLBUTRIN XL) 300 MG 24 hr tablet, Take 1 tablet (300 mg total) by mouth once daily., Disp: 90 tablet, Rfl: 1    cetirizine (ZYRTEC) 10 MG tablet, Take 1 tablet (10 mg total) by mouth once daily., Disp: 30 tablet, Rfl: 0    cholecalciferol, vitamin D3, (VITAMIN D3) 25 mcg (1,000 unit) capsule, Take 1 capsule (1,000 Units total) by mouth once daily., Disp: 30 capsule, Rfl: 0    clopidogreL (PLAVIX) 75 mg tablet, Take 75 mg by mouth once daily., Disp: , Rfl:     cyanocobalamin (VITAMIN B-12) 100 MCG tablet, Take 100 mcg by mouth once daily., Disp: , Rfl:     isosorbide mononitrate (IMDUR) 30 MG 24 hr tablet, Take 1 tablet (30 mg total) by mouth once daily., Disp: 30 tablet, Rfl: 11    nitrofurantoin, macrocrystal-monohydrate, (MACROBID) 100 MG capsule, Take 1 capsule (100 mg total) by mouth 2 (two) times daily., Disp: 14 capsule, Rfl: 0    ondansetron (ZOFRAN) 4 MG tablet, Take 1 tablet (4 mg total) by mouth every 6 (six) hours., Disp: 12 tablet, Rfl: 0    promethazine (PHENERGAN) 25 MG tablet, Take 1 tablet (25 mg total) by mouth every 6 (six) hours as needed for Nausea., Disp: 15 tablet, Rfl: 0    zinc sulfate (ZINCATE) 50 mg zinc (220 mg) capsule, Take 1 capsule (220 mg total) by mouth once daily., Disp: 30 capsule, Rfl: 0      New & refilled meds:  Requested Prescriptions     Signed Prescriptions Disp Refills    nitrofurantoin, macrocrystal-monohydrate, (MACROBID) 100 MG capsule 14 capsule 0     Sig: Take 1 capsule (100 mg total) by mouth 2 (two) times daily.     Treatment Recommendations:    Orders  and follow up as documented in patient record.    Return to clinic 6m for routine follow up and as needed.    Signature: BRIANNA Naranjo

## 2022-02-17 ENCOUNTER — CLINICAL SUPPORT (OUTPATIENT)
Dept: PULMONOLOGY | Facility: HOSPITAL | Age: 72
End: 2022-02-17
Attending: INTERNAL MEDICINE
Payer: MEDICARE

## 2022-02-17 ENCOUNTER — OFFICE VISIT (OUTPATIENT)
Dept: PULMONOLOGY | Facility: CLINIC | Age: 72
End: 2022-02-17
Payer: MEDICARE

## 2022-02-17 VITALS
WEIGHT: 236 LBS | HEIGHT: 68 IN | DIASTOLIC BLOOD PRESSURE: 90 MMHG | HEART RATE: 83 BPM | OXYGEN SATURATION: 96 % | SYSTOLIC BLOOD PRESSURE: 140 MMHG | BODY MASS INDEX: 35.77 KG/M2

## 2022-02-17 DIAGNOSIS — R06.02 SHORTNESS OF BREATH: ICD-10-CM

## 2022-02-17 PROCEDURE — 94729 DIFFUSING CAPACITY: CPT

## 2022-02-17 PROCEDURE — 94060 EVALUATION OF WHEEZING: CPT

## 2022-02-17 PROCEDURE — 94060 EVALUATION OF WHEEZING: CPT | Mod: 26,,, | Performed by: INTERNAL MEDICINE

## 2022-02-17 PROCEDURE — 94729 PR C02/MEMBANE DIFFUSE CAPACITY: ICD-10-PCS | Mod: 26,,, | Performed by: INTERNAL MEDICINE

## 2022-02-17 PROCEDURE — 94729 DIFFUSING CAPACITY: CPT | Mod: 26,,, | Performed by: INTERNAL MEDICINE

## 2022-02-17 PROCEDURE — 94727 PR PULM FUNCTION TEST BY GAS: ICD-10-PCS | Mod: 26,,, | Performed by: INTERNAL MEDICINE

## 2022-02-17 PROCEDURE — 99213 PR OFFICE/OUTPT VISIT, EST, LEVL III, 20-29 MIN: ICD-10-PCS | Mod: S$PBB,25,, | Performed by: INTERNAL MEDICINE

## 2022-02-17 PROCEDURE — 94726 PLETHYSMOGRAPHY LUNG VOLUMES: CPT

## 2022-02-17 PROCEDURE — 99213 OFFICE O/P EST LOW 20 MIN: CPT | Mod: PBBFAC,25 | Performed by: INTERNAL MEDICINE

## 2022-02-17 PROCEDURE — 94727 GAS DIL/WSHOT DETER LNG VOL: CPT

## 2022-02-17 PROCEDURE — 99213 OFFICE O/P EST LOW 20 MIN: CPT | Mod: S$PBB,25,, | Performed by: INTERNAL MEDICINE

## 2022-02-17 PROCEDURE — 94727 GAS DIL/WSHOT DETER LNG VOL: CPT | Mod: 26,,, | Performed by: INTERNAL MEDICINE

## 2022-02-17 PROCEDURE — 94060 PR EVAL OF BRONCHOSPASM: ICD-10-PCS | Mod: 26,,, | Performed by: INTERNAL MEDICINE

## 2022-02-17 NOTE — PROGRESS NOTES
Call returned to patient.     [facility]    Needs new scripts    Synthroid 50 mcg Monday through Friday    75 mcg Saturday and Sunday    KAREEM    90 days    Atenolol 50 mg daily    [phone] fax    [facility] sent PFT PROCEDURE EXPLAINED AND DEMONSTRATED. GOOD PATIENT EFFORT AND COOPERATION. ALBUTEROL ADMINISTERED AND TEST REPEATED. TOLERATED WELL. D/C IN GOOD CONDITION.

## 2022-02-17 NOTE — PROGRESS NOTES
Subjective:       Patient ID: aMrci Aguilera is a 71 y.o. female.    Chief Complaint: Follow-up (F/u after PFT)    Follow-up  This is a chronic problem. The current episode started more than 1 month ago. The problem occurs daily. The problem has been unchanged. Pertinent negatives include no abdominal pain, arthralgias, chest pain, chills, congestion, headaches or rash. The symptoms are aggravated by exertion. She has tried nothing for the symptoms.     Past Medical History:   Diagnosis Date    Atrophic vaginitis 05/12/2004    Coronary artery disease     Cystitis 02/2004    Deep vein thrombosis     DVT (deep venous thrombosis)     Esophageal dysphagia 12/27/2021    Esophagitis 12/27/2021    HH (hiatus hernia) 12/27/2021    Hypertension     Pulmonary embolus     after Septic ~ 2019    Thyroid disease     Vitamin D deficiency      Past Surgical History:   Procedure Laterality Date    CATARACT EXTRACTION      CHOLECYSTECTOMY      CORONARY STENT PLACEMENT      per  2020    FLUOROSCOPIC URODYNAMIC STUDY  10/28/2019    interpreted on 10/29/2019 by Dr. Rudolph Bocanegra;  bladder pain during filling at 320cc,  urodynamic stress incontinence at 302cc without ISD,  no evidence of OAB symtomatology    HIP REPLACEMENT ARTHROPLASTY      JOINT REPLACEMENT      LEFT HEART CATHETERIZATION N/A 6/7/2021    Procedure: Left heart cath with possible intervention;  Surgeon: Chris Dave MD;  Location: [facility] CATH LAB;  Service: Cardiology;  Laterality: N/A;    rotator cuff      TOTAL ABDOMINAL HYSTERECTOMY      Transobtruator Tape Urethral Sling procedure and limited Cystoscopy  01/29/2020    at [facility];  no complications     Family History   Problem Relation Age of Onset    Pancreatic cancer Paternal Grandmother     Heart disease Father     Heart disease Mother     Uterine cancer Sister     Heart disease Sister     Cancer Sister     Stroke Brother       Review of patient's allergies indicates:   Allergen Reactions    Statins-hmg-coa reductase inhibitors Other (See Comments)     Refuses to take due to causing muscle aches and joint pain    Diphtheria toxin preparations     Dristan allergy Other (See Comments)     hallucinations    Tetanus vaccines and toxoid Other (See Comments)     Arm pain and swelling    Penicillins Rash      Social History     Tobacco Use    Smoking status: Never Smoker    Smokeless tobacco: Never Used   Substance Use Topics    Alcohol use: Yes     Comment: occassional    Drug use: Not Currently     Types: Flunitrazepam      Review of Systems   Constitutional: Negative for chills, activity change and night sweats.   HENT: Negative for congestion and ear pain.    Eyes: Negative for redness and itching.   Cardiovascular: Negative for chest pain and palpitations.   Musculoskeletal: Negative for arthralgias and back pain.   Skin: Negative for rash.   Gastrointestinal: Negative for abdominal pain and abdominal distention.   Neurological: Negative for dizziness and headaches.   Hematological: Negative for adenopathy. Does not bruise/bleed easily.   Psychiatric/Behavioral: Negative for confusion. The patient is not nervous/anxious.        Objective:      Physical Exam   Constitutional: She is oriented to person, place, and time. She appears well-developed and well-nourished.   HENT:   Head: Normocephalic.   Nose: Nose normal.   Mouth/Throat: Oropharynx is clear and moist.   Neck: No JVD present. No thyromegaly present.   Cardiovascular: Normal rate, regular rhythm, normal heart sounds and intact distal pulses.   Pulmonary/Chest: Normal expansion, hyperinflation, symmetric chest wall expansion, effort normal and breath sounds normal.   Abdominal: Soft. Bowel sounds are normal.   Musculoskeletal:         General: Normal range of motion.      Cervical back: Normal range of motion and neck supple.   Lymphadenopathy: No supraclavicular  adenopathy is present.     She has no cervical adenopathy.     She has no axillary adenopathy.   Neurological: She is alert and oriented to person, place, and time. She has normal reflexes.   Skin: Skin is warm and dry.   Psychiatric: She has a normal mood and affect. Her behavior is normal.     Personal Diagnostic Review  Pulmonary function test unremarkable except for mild restriction CT chest and color-flow Doppler negative    No flowsheet data found.      Assessment:       1. Shortness of breath        Outpatient Encounter Medications as of 2/17/2022   Medication Sig Dispense Refill    albuterol (VENTOLIN HFA) 90 mcg/actuation inhaler Inhale 2 puffs into the lungs every 6 (six) hours as needed for Wheezing. Rescue 18 g 5    aspirin (ECOTRIN) 81 MG EC tablet Take 81 mg by mouth once daily.      pantoprazole (PROTONIX) 40 MG tablet Take 1 tablet (40 mg total) by mouth once daily. 30 tablet 11    vitamin D (VITAMIN D3) 1000 units Tab Take 1,000 Units by mouth once daily.      beclomethasone (QVAR) 80 mcg/actuation Aero Inhale 2 puffs into the lungs 2 (two) times a day. Controller 10.2 g 5    budesonide-formoterol 160-4.5 mcg (SYMBICORT) 160-4.5 mcg/actuation HFAA Inhale 2 puffs into the lungs every 12 (twelve) hours. Controller (Patient not taking: No sig reported) 10.2 g 5    nitrofurantoin, macrocrystal-monohydrate, (MACROBID) 100 MG capsule Take 1 capsule (100 mg total) by mouth 2 (two) times daily. (Patient not taking: Reported on 2/17/2022) 14 capsule 0    [DISCONTINUED] albuterol (PROVENTIL/VENTOLIN HFA) 90 mcg/actuation inhaler Inhale 1-2 puffs into the lungs every 4 (four) hours as needed for Wheezing. Rescue 1 g 0    [DISCONTINUED] ascorbic acid, vitamin C, 500 mg Cap Take 1 tablet by mouth once daily. 30 capsule 2    [DISCONTINUED] aspirin 81 MG Chew Take 1 tablet (81 mg total) by mouth once daily. 30 tablet 0    [DISCONTINUED] buPROPion (WELLBUTRIN XL) 150 MG TB24 tablet Take 1 tablet (150 mg  total) by mouth once daily. 30 tablet 1    [DISCONTINUED] buPROPion (WELLBUTRIN XL) 300 MG 24 hr tablet Take 1 tablet (300 mg total) by mouth once daily. 90 tablet 1    [DISCONTINUED] cetirizine (ZYRTEC) 10 MG tablet Take 1 tablet (10 mg total) by mouth once daily. 30 tablet 0    [DISCONTINUED] cholecalciferol, vitamin D3, (VITAMIN D3) 25 mcg (1,000 unit) capsule Take 1 capsule (1,000 Units total) by mouth once daily. 30 capsule 0    [DISCONTINUED] clopidogreL (PLAVIX) 75 mg tablet Take 75 mg by mouth once daily.      [DISCONTINUED] cyanocobalamin (VITAMIN B-12) 100 MCG tablet Take 100 mcg by mouth once daily.      [DISCONTINUED] isosorbide mononitrate (IMDUR) 30 MG 24 hr tablet Take 1 tablet (30 mg total) by mouth once daily. 30 tablet 11    [DISCONTINUED] ondansetron (ZOFRAN) 4 MG tablet Take 1 tablet (4 mg total) by mouth every 6 (six) hours. 12 tablet 0    [DISCONTINUED] promethazine (PHENERGAN) 25 MG tablet Take 1 tablet (25 mg total) by mouth every 6 (six) hours as needed for Nausea. 15 tablet 0    [DISCONTINUED] zinc sulfate (ZINCATE) 50 mg zinc (220 mg) capsule Take 1 capsule (220 mg total) by mouth once daily. 30 capsule 0     No facility-administered encounter medications on file as of 2/17/2022.     No orders of the defined types were placed in this encounter.      Plan:       Problem List Items Addressed This Visit        Other    Shortness of breath     Patient is short of breath has really pretty normal pulmonary function test she has had COVID in the past and has some mild interstitial changes on her x-ray from that she did get inhaled steroids so will go ahead and try to find 1 that they will pay for use her p.r.n. Ventolin increase her activities no changes otherwise  No cardiac disease or pulmonary hypertension

## 2022-02-17 NOTE — ASSESSMENT & PLAN NOTE
Patient is short of breath has really pretty normal pulmonary function test she has had COVID in the past and has some mild interstitial changes on her x-ray from that she did get inhaled steroids so will go ahead and try to find 1 that they will pay for use her p.r.n. Ventolin increase her activities no changes otherwise  No cardiac disease or pulmonary hypertension

## 2022-03-11 DIAGNOSIS — Z71.89 COMPLEX CARE COORDINATION: ICD-10-CM

## 2022-03-28 ENCOUNTER — OFFICE VISIT (OUTPATIENT)
Dept: CARDIOLOGY | Facility: CLINIC | Age: 72
End: 2022-03-28
Payer: MEDICARE

## 2022-03-28 VITALS
WEIGHT: 231 LBS | SYSTOLIC BLOOD PRESSURE: 128 MMHG | OXYGEN SATURATION: 96 % | BODY MASS INDEX: 35.01 KG/M2 | HEART RATE: 75 BPM | HEIGHT: 68 IN | DIASTOLIC BLOOD PRESSURE: 72 MMHG

## 2022-03-28 DIAGNOSIS — R07.9 CHEST PAIN, UNSPECIFIED TYPE: Primary | ICD-10-CM

## 2022-03-28 DIAGNOSIS — I25.110 ATHEROSCLEROSIS OF NATIVE CORONARY ARTERY OF NATIVE HEART WITH UNSTABLE ANGINA PECTORIS: ICD-10-CM

## 2022-03-28 PROCEDURE — 99214 PR OFFICE/OUTPT VISIT, EST, LEVL IV, 30-39 MIN: ICD-10-PCS | Mod: S$PBB,,, | Performed by: INTERNAL MEDICINE

## 2022-03-28 PROCEDURE — 93005 ELECTROCARDIOGRAM TRACING: CPT | Mod: PBBFAC | Performed by: INTERNAL MEDICINE

## 2022-03-28 PROCEDURE — 93010 EKG 12-LEAD: ICD-10-PCS | Mod: S$PBB,,, | Performed by: INTERNAL MEDICINE

## 2022-03-28 PROCEDURE — 93010 ELECTROCARDIOGRAM REPORT: CPT | Mod: S$PBB,,, | Performed by: INTERNAL MEDICINE

## 2022-03-28 PROCEDURE — 99214 OFFICE O/P EST MOD 30 MIN: CPT | Mod: PBBFAC | Performed by: INTERNAL MEDICINE

## 2022-03-28 PROCEDURE — 99214 OFFICE O/P EST MOD 30 MIN: CPT | Mod: S$PBB,,, | Performed by: INTERNAL MEDICINE

## 2022-03-28 NOTE — PROGRESS NOTES
PCP: ANKUSH Hunter    Referring Provider:     Subjective:   Marci Aguilera is a 71 y.o. female with hx of nonsmoker, with hx of CAD s/p PCI to LCx in 2018, diabetes, DVT, HTN, HLD,  who presents for evaluation of intrascapular pain and shortness of breath.      Patient states she had episode of diaphoresis and weakness 3 days ago that occurred at rest, and since that time has had pain between shoulder blades.  She has not taken nitroglycerin.  She states she was unable to tolerate Imdur.     Family history of heart disease, mother and father     EKG  3/28/22:  NSR, cannot rule out anterior infarct, age undetermined            10/2/2020:  Sinus rhythm. Low QRS voltages in precordial leads   ECHO  6/7/21:   Normal left ventricular cavity with overall normal systolic function.  EF 60%.                Mild to mod tricuspid regurgitation                LHC 8/20/20, Daivn Orantes's, stent to distal LCx.             6/7/21:  Nonobstructive CAD.                         Mildly elevated LVEDP 15 mmHg    Lexiscan stress test 3/10/21 - [facility]: Normal                                 Past Surgical History:   Procedure Laterality Date    CATARACT EXTRACTION      CHOLECYSTECTOMY      CORONARY STENT PLACEMENT      per  2020    FLUOROSCOPIC URODYNAMIC STUDY  10/28/2019    interpreted on 10/29/2019 by Dr. Rudolph Bocanegra;  bladder pain during filling at 320cc,  urodynamic stress incontinence at 302cc without ISD,  no evidence of OAB symtomatology    HIP REPLACEMENT ARTHROPLASTY      JOINT REPLACEMENT      LEFT HEART CATHETERIZATION N/A 6/7/2021    Procedure: Left heart cath with possible intervention;  Surgeon: Chris Dave MD;  Location: [facility] CATH LAB;  Service: Cardiology;  Laterality: N/A;    rotator cuff      TOTAL ABDOMINAL HYSTERECTOMY      Transobtruator Tape Urethral Sling procedure and limited Cystoscopy  01/29/2020    at [facility];  no complications       Family History   Problem Relation Age of Onset    Pancreatic cancer Paternal Grandmother     Heart disease Father     Heart disease Mother     Uterine cancer Sister     Heart disease Sister     Cancer Sister     Stroke Brother       Social History     Socioeconomic History    Marital status:    Tobacco Use    Smoking status: Never Smoker    Smokeless tobacco: Never Used   Substance and Sexual Activity    Alcohol use: Yes     Comment: occassional    Drug use: Not Currently     Types: Flunitrazepam    Sexual activity: Never        Lab Results   Component Value Date     11/25/2021    K 4.2 11/25/2021     11/25/2021    CO2 25 11/25/2021    BUN 12 11/25/2021    CREATININE 0.93 11/25/2021    CALCIUM 8.5 11/25/2021    ANIONGAP 12 11/25/2021    ESTGFRAFRICA 91 10/02/2020    EGFRNONAA 63 11/25/2021       No results found for: CHOL  No results found for: HDL  No results found for: LDLCALC  No results found for: TRIG  No results found for: CHOLHDL    Lab Results   Component Value Date    WBC 4.45 (L) 11/25/2021    HGB 12.9 11/25/2021    HCT 39.0 11/25/2021    MCV 90.1 11/25/2021     11/25/2021           Current Outpatient Medications:     albuterol (VENTOLIN HFA) 90 mcg/actuation inhaler, Inhale 2 puffs into the lungs every 6 (six) hours as needed for Wheezing. Rescue, Disp: 18 g, Rfl: 5    aspirin (ECOTRIN) 81 MG EC tablet, Take 81 mg by mouth once daily., Disp: , Rfl:     budesonide-formoterol 160-4.5 mcg (SYMBICORT) 160-4.5 mcg/actuation HFAA, Inhale 2 puffs into the lungs every 12 (twelve) hours. Controller (Patient not taking: No sig reported), Disp: 10.2 g, Rfl: 5    fluticasone furoate (ARNUITY ELLIPTA) 200 mcg/actuation inhaler, Inhale 1 puff into the lungs once daily., Disp: 30 each, Rfl: 5    nitrofurantoin, macrocrystal-monohydrate, (MACROBID) 100 MG capsule, Take 1 capsule (100 mg total) by mouth 2 (two) times daily. (Patient not taking: No sig reported), Disp: 14  "capsule, Rfl: 0    pantoprazole (PROTONIX) 40 MG tablet, Take 1 tablet (40 mg total) by mouth once daily., Disp: 30 tablet, Rfl: 11    vitamin D (VITAMIN D3) 1000 units Tab, Take 1,000 Units by mouth once daily., Disp: , Rfl:        Review of Systems   Constitutional: Positive for malaise/fatigue.   Respiratory: Negative for cough and shortness of breath.    Cardiovascular: Negative for chest pain, palpitations, orthopnea, claudication, leg swelling and PND.         Objective:   /72 (BP Location: Right arm, Patient Position: Sitting)   Pulse 75   Ht 5' 8" (1.727 m)   Wt 104.8 kg (231 lb)   SpO2 96%   BMI 35.12 kg/m²     Physical Exam  Eyes:      General: No scleral icterus.  Neck:      Vascular: No carotid bruit.   Cardiovascular:      Rate and Rhythm: Normal rate and regular rhythm.      Pulses: Normal pulses.      Heart sounds: Normal heart sounds. No murmur heard.  Pulmonary:      Effort: Pulmonary effort is normal.      Breath sounds: Normal breath sounds. No wheezing or rales.   Musculoskeletal:         General: No swelling.   Neurological:      Mental Status: She is alert and oriented to person, place, and time.           Assessment:     1. Chest pain, unspecified type  EKG 12-lead    NM Myocardial Perfusion Spect Multi Exer    Nuclear Stress Test    EKG 12-lead   2. Atherosclerosis of native coronary artery of native heart with unstable angina pectoris           Plan:     Problem List Items Addressed This Visit        Cardiac/Vascular    Atherosclerosis of native coronary artery of native heart with unstable angina pectoris      Other Visit Diagnoses     Chest pain, unspecified type    -  Primary    Relevant Orders    EKG 12-lead    NM Myocardial Perfusion Spect Multi Exer    Nuclear Stress Test           # coronary artery disease  [facility] 6/7/21:  Nonobstructive CAD,  Mildly elevated LVEDP  15 mmHg    She has some ISR in her small LCx stent. LAD has intermediate lesions 50%.  She has focal " intermediate lesions in the mid to distal LAD, given her multiple episodes of chest., recently an episode of interscapular pain associated with diaphoresis.  I do suspect her LAD stenosis to be worse than expected.  Recommend doing a exercise Cardiolite to assess her exercise capacity as well as screen for anterior ischemia.  If she has anterior ischemia then she would benefit from revascularization of her LAD.      Check lipid profile   She gets very weak with toprol and imdur. She does not want to try it again.

## 2022-04-04 ENCOUNTER — HOSPITAL ENCOUNTER (OUTPATIENT)
Dept: RADIOLOGY | Facility: HOSPITAL | Age: 72
Discharge: HOME | End: 2022-04-04
Attending: INTERNAL MEDICINE
Payer: MEDICARE

## 2022-04-04 ENCOUNTER — HOSPITAL ENCOUNTER (OUTPATIENT)
Dept: CARDIOLOGY | Facility: HOSPITAL | Age: 72
Discharge: HOME | End: 2022-04-04
Attending: INTERNAL MEDICINE
Payer: MEDICARE

## 2022-04-04 DIAGNOSIS — R07.9 CHEST PAIN, UNSPECIFIED TYPE: ICD-10-CM

## 2022-04-04 LAB
CV STRESS BASE HR: 67 BPM
DIASTOLIC BLOOD PRESSURE: 74 MMHG
OHS CV CPX 1 MINUTE RECOVERY HEART RATE: 80 BPM
OHS CV CPX 85 PERCENT MAX PREDICTED HEART RATE MALE: 122
OHS CV CPX MAX PREDICTED HEART RATE: 144
OHS CV CPX PATIENT IS FEMALE: 1
OHS CV CPX PATIENT IS MALE: 0
OHS CV CPX PEAK DIASTOLIC BLOOD PRESSURE: 67 MMHG
OHS CV CPX PEAK HEAR RATE: 82 BPM
OHS CV CPX PEAK RATE PRESSURE PRODUCT: NORMAL
OHS CV CPX PEAK SYSTOLIC BLOOD PRESSURE: 131 MMHG
OHS CV CPX PERCENT MAX PREDICTED HEART RATE ACHIEVED: 57
OHS CV CPX RATE PRESSURE PRODUCT PRESENTING: 9246
SYSTOLIC BLOOD PRESSURE: 138 MMHG

## 2022-04-04 PROCEDURE — A9500 TC99M SESTAMIBI: HCPCS

## 2022-04-04 PROCEDURE — 93018 NUCLEAR STRESS TEST (CUPID ONLY): ICD-10-PCS | Mod: ,,, | Performed by: INTERNAL MEDICINE

## 2022-04-04 PROCEDURE — 63600175 PHARM REV CODE 636 W HCPCS: Performed by: INTERNAL MEDICINE

## 2022-04-04 PROCEDURE — 93016 CV STRESS TEST SUPVJ ONLY: CPT | Mod: ,,, | Performed by: NURSE PRACTITIONER

## 2022-04-04 PROCEDURE — 78452 HT MUSCLE IMAGE SPECT MULT: CPT | Mod: 26,,, | Performed by: INTERNAL MEDICINE

## 2022-04-04 PROCEDURE — 78452 NM MYOCARDIAL PERFUSION SPECT MULTI STUDY: ICD-10-PCS | Mod: 26,,, | Performed by: INTERNAL MEDICINE

## 2022-04-04 PROCEDURE — 93018 CV STRESS TEST I&R ONLY: CPT | Mod: ,,, | Performed by: INTERNAL MEDICINE

## 2022-04-04 PROCEDURE — 93016 NUCLEAR STRESS TEST (CUPID ONLY): ICD-10-PCS | Mod: ,,, | Performed by: NURSE PRACTITIONER

## 2022-04-04 PROCEDURE — 93017 CV STRESS TEST TRACING ONLY: CPT

## 2022-04-04 RX ORDER — REGADENOSON 0.08 MG/ML
0.4 INJECTION, SOLUTION INTRAVENOUS ONCE
Status: COMPLETED | OUTPATIENT
Start: 2022-04-04 | End: 2022-04-04

## 2022-04-04 RX ADMIN — REGADENOSON 0.4 MG: 0.08 INJECTION, SOLUTION INTRAVENOUS at 10:04

## 2022-04-06 ENCOUNTER — HOSPITAL ENCOUNTER (EMERGENCY)
Facility: HOSPITAL | Age: 72
Discharge: HOME | End: 2022-04-07
Payer: MEDICARE

## 2022-04-06 DIAGNOSIS — R07.9 CHEST PAIN: ICD-10-CM

## 2022-04-06 LAB
ALBUMIN SERPL BCP-MCNC: 3.4 G/DL (ref 3.5–5)
ALBUMIN/GLOB SERPL: 1 {RATIO}
ALP SERPL-CCNC: 69 U/L (ref 55–142)
ALT SERPL W P-5'-P-CCNC: 21 U/L (ref 13–56)
ANION GAP SERPL CALCULATED.3IONS-SCNC: 15 MMOL/L (ref 7–16)
AST SERPL W P-5'-P-CCNC: 14 U/L (ref 15–37)
BASOPHILS # BLD AUTO: 0.02 K/UL (ref 0–0.2)
BASOPHILS NFR BLD AUTO: 0.3 % (ref 0–1)
BILIRUB SERPL-MCNC: 0.2 MG/DL (ref 0–1.2)
BUN SERPL-MCNC: 21 MG/DL (ref 7–18)
BUN/CREAT SERPL: 17 (ref 6–20)
CALCIUM SERPL-MCNC: 8.3 MG/DL (ref 8.5–10.1)
CHLORIDE SERPL-SCNC: 105 MMOL/L (ref 98–107)
CO2 SERPL-SCNC: 24 MMOL/L (ref 21–32)
CREAT SERPL-MCNC: 1.25 MG/DL (ref 0.55–1.02)
DIFFERENTIAL METHOD BLD: ABNORMAL
EOSINOPHIL # BLD AUTO: 0.15 K/UL (ref 0–0.5)
EOSINOPHIL NFR BLD AUTO: 2.1 % (ref 1–4)
EOSINOPHIL NFR BLD MANUAL: 1 % (ref 1–4)
ERYTHROCYTE [DISTWIDTH] IN BLOOD BY AUTOMATED COUNT: 14.5 % (ref 11.5–14.5)
GLOBULIN SER-MCNC: 3.3 G/DL (ref 2–4)
GLUCOSE SERPL-MCNC: 92 MG/DL (ref 74–106)
HCT VFR BLD AUTO: 40 % (ref 38–47)
HGB BLD-MCNC: 13 G/DL (ref 12–16)
LYMPHOCYTES # BLD AUTO: 2.65 K/UL (ref 1–4.8)
LYMPHOCYTES NFR BLD AUTO: 36.9 % (ref 27–41)
LYMPHOCYTES NFR BLD MANUAL: 34 % (ref 27–41)
MCH RBC QN AUTO: 29.2 PG (ref 27–31)
MCHC RBC AUTO-ENTMCNC: 32.5 G/DL (ref 32–36)
MCV RBC AUTO: 89.9 FL (ref 80–96)
MONOCYTES # BLD AUTO: 0.69 K/UL (ref 0–0.8)
MONOCYTES NFR BLD AUTO: 9.6 % (ref 2–6)
MONOCYTES NFR BLD MANUAL: 8 % (ref 2–6)
MPC BLD CALC-MCNC: 12.1 FL (ref 9.4–12.4)
NEUTROPHILS # BLD AUTO: 3.68 K/UL (ref 1.8–7.7)
NEUTROPHILS NFR BLD AUTO: 51.1 % (ref 53–65)
NEUTS SEG NFR BLD MANUAL: 57 % (ref 50–62)
NRBC BLD MANUAL-RTO: ABNORMAL %
NT-PROBNP SERPL-MCNC: 88 PG/ML (ref 1–125)
PLATELET # BLD AUTO: 51 K/UL (ref 150–400)
PLATELET MORPHOLOGY: ABNORMAL
POTASSIUM SERPL-SCNC: 4.3 MMOL/L (ref 3.5–5.1)
PROT SERPL-MCNC: 6.7 G/DL (ref 6.4–8.2)
RBC # BLD AUTO: 4.45 M/UL (ref 4.2–5.4)
RBC MORPH BLD: NORMAL
SODIUM SERPL-SCNC: 140 MMOL/L (ref 136–145)
TROPONIN I SERPL HS-MCNC: 5 PG/ML
WBC # BLD AUTO: 7.19 K/UL (ref 4.5–11)

## 2022-04-06 PROCEDURE — 99285 EMERGENCY DEPT VISIT HI MDM: CPT | Mod: 25

## 2022-04-06 PROCEDURE — 80053 COMPREHEN METABOLIC PANEL: CPT | Performed by: REGISTERED NURSE

## 2022-04-06 PROCEDURE — 84484 ASSAY OF TROPONIN QUANT: CPT | Performed by: REGISTERED NURSE

## 2022-04-06 PROCEDURE — 36415 COLL VENOUS BLD VENIPUNCTURE: CPT | Performed by: REGISTERED NURSE

## 2022-04-06 PROCEDURE — 99283 EMERGENCY DEPT VISIT LOW MDM: CPT | Mod: GF | Performed by: REGISTERED NURSE

## 2022-04-06 PROCEDURE — 93005 ELECTROCARDIOGRAM TRACING: CPT

## 2022-04-06 PROCEDURE — 85025 COMPLETE CBC W/AUTO DIFF WBC: CPT | Performed by: REGISTERED NURSE

## 2022-04-06 PROCEDURE — 93010 ELECTROCARDIOGRAM REPORT: CPT | Performed by: FAMILY MEDICINE

## 2022-04-06 PROCEDURE — 94761 N-INVAS EAR/PLS OXIMETRY MLT: CPT

## 2022-04-06 PROCEDURE — 96360 HYDRATION IV INFUSION INIT: CPT | Mod: GZ

## 2022-04-06 PROCEDURE — 83880 ASSAY OF NATRIURETIC PEPTIDE: CPT | Performed by: REGISTERED NURSE

## 2022-04-07 ENCOUNTER — TELEPHONE (OUTPATIENT)
Dept: CARDIOLOGY | Facility: CLINIC | Age: 72
End: 2022-04-07
Payer: MEDICARE

## 2022-04-07 VITALS
HEIGHT: 68 IN | WEIGHT: 232 LBS | DIASTOLIC BLOOD PRESSURE: 59 MMHG | BODY MASS INDEX: 35.16 KG/M2 | HEART RATE: 67 BPM | SYSTOLIC BLOOD PRESSURE: 126 MMHG | OXYGEN SATURATION: 98 % | TEMPERATURE: 98 F | RESPIRATION RATE: 16 BRPM

## 2022-04-07 DIAGNOSIS — M89.8X1 SHOULDER BLADE PAIN: Primary | ICD-10-CM

## 2022-04-07 LAB — TROPONIN I SERPL HS-MCNC: 5.9 PG/ML

## 2022-04-07 PROCEDURE — 36415 COLL VENOUS BLD VENIPUNCTURE: CPT | Performed by: REGISTERED NURSE

## 2022-04-07 PROCEDURE — 84484 ASSAY OF TROPONIN QUANT: CPT | Performed by: REGISTERED NURSE

## 2022-04-07 PROCEDURE — 25000003 PHARM REV CODE 250: Performed by: REGISTERED NURSE

## 2022-04-07 RX ADMIN — SODIUM CHLORIDE 1000 ML: 9 INJECTION, SOLUTION INTRAVENOUS at 12:04

## 2022-04-07 NOTE — ED TRIAGE NOTES
Pt presents to ED with c/o pain between shoulder blades and SOB. Pt states this has been going on since she woke up this am at approx 0700. Pt states she had a stress test within the last week. Pt states that she has a narrowing in aorta, which may have to be stented. Pt already has one stent.

## 2022-04-07 NOTE — ED PROVIDER NOTES
Encounter Date: 4/6/2022       History     Chief Complaint   Patient presents with    Back Pain    Shortness of Breath     Marci Aguilera is a 70 yo WF that presents with pain between shoulder blades and SOB since this morning.  She states this is the same pain she had before when she had a stent placed.  She denies chest pain.  States she had a chemical stress test 4-3-22 by Dr. Zavala.      The history is provided by the patient.     Review of patient's allergies indicates:   Allergen Reactions    Statins-hmg-coa reductase inhibitors Other (See Comments)     Refuses to take due to causing muscle aches and joint pain    Codeine     Diphtheria toxin preparations     Dristan allergy Other (See Comments)     hallucinations    Tetanus vaccines and toxoid Other (See Comments)     Arm pain and swelling    Penicillins Rash     Past Medical History:   Diagnosis Date    Atrophic vaginitis 05/12/2004    Coronary artery disease     Cystitis 02/2004    Deep vein thrombosis     DVT (deep venous thrombosis)     Esophageal dysphagia 12/27/2021    Esophagitis 12/27/2021    HH (hiatus hernia) 12/27/2021    Pulmonary embolus     after Septic ~ 2019    Thyroid disease     Vitamin D deficiency      Past Surgical History:   Procedure Laterality Date    CATARACT EXTRACTION      CHOLECYSTECTOMY      CORONARY STENT PLACEMENT      per  2020    FLUOROSCOPIC URODYNAMIC STUDY  10/28/2019    interpreted on 10/29/2019 by Dr. Rudolph Bocanegra;  bladder pain during filling at 320cc,  urodynamic stress incontinence at 302cc without ISD,  no evidence of OAB symtomatology    HIP REPLACEMENT ARTHROPLASTY      JOINT REPLACEMENT      LEFT HEART CATHETERIZATION N/A 6/7/2021    Procedure: Left heart cath with possible intervention;  Surgeon: Chris Dave MD;  Location: [facility] CATH LAB;  Service: Cardiology;  Laterality: N/A;    rotator cuff      TOTAL ABDOMINAL HYSTERECTOMY      Transobtruator Tape Urethral  Sling procedure and limited Cystoscopy  01/29/2020    at [facility];  no complications     Family History   Problem Relation Age of Onset    Pancreatic cancer Paternal Grandmother     Heart disease Father     Heart disease Mother     Uterine cancer Sister     Heart disease Sister     Cancer Sister     Stroke Brother      Social History     Tobacco Use    Smoking status: Never Smoker    Smokeless tobacco: Never Used   Substance Use Topics    Alcohol use: Yes     Comment: occassional    Drug use: Not Currently     Types: Flunitrazepam     Review of Systems   Constitutional: Positive for fatigue. Negative for diaphoresis.   HENT: Negative.    Eyes: Negative.    Respiratory: Positive for shortness of breath.    Cardiovascular: Negative.    Gastrointestinal: Negative.    Endocrine: Negative.    Genitourinary: Negative.    Musculoskeletal: Negative.    Skin: Negative.    Neurological: Negative.    Hematological: Negative.    Psychiatric/Behavioral: Negative.        Physical Exam     Initial Vitals [04/06/22 2218]   BP Pulse Resp Temp SpO2   (!) 142/80 79 19 98.3 °F (36.8 °C) 96 %      MAP       --         Physical Exam    Constitutional: She appears well-developed and well-nourished. She is not diaphoretic. No distress.   HENT:   Head: Normocephalic and atraumatic.   Right Ear: External ear normal.   Left Ear: External ear normal.   Nose: Nose normal.   Mouth/Throat: Oropharynx is clear and moist. No oropharyngeal exudate.   Eyes: Conjunctivae and EOM are normal. Pupils are equal, round, and reactive to light. No scleral icterus.   Neck: Neck supple.   Normal range of motion.  Cardiovascular: Normal rate, regular rhythm, normal heart sounds and intact distal pulses.   Pulmonary/Chest: Breath sounds normal.   Abdominal: Abdomen is soft. Bowel sounds are normal.   Musculoskeletal:         General: No edema. Normal range of motion.      Cervical back: Normal range of motion and neck  supple.     Neurological: She is alert and oriented to person, place, and time. GCS score is 15. GCS eye subscore is 4. GCS verbal subscore is 5. GCS motor subscore is 6.   Skin: Skin is warm and dry. Capillary refill takes less than 2 seconds.   Psychiatric: She has a normal mood and affect. Her behavior is normal. Judgment and thought content normal.         Medical Screening Exam   See Full Note    ED Course   Procedures  Labs Reviewed   COMPREHENSIVE METABOLIC PANEL - Abnormal; Notable for the following components:       Result Value    BUN 21 (*)     Creatinine 1.25 (*)     Calcium 8.3 (*)     Albumin 3.4 (*)     AST 14 (*)     eGFR 45 (*)     All other components within normal limits   CBC WITH DIFFERENTIAL - Abnormal; Notable for the following components:    Platelet Count 51 (*)     Neutrophils % 51.1 (*)     Monocytes % 9.6 (*)     All other components within normal limits   MANUAL DIFFERENTIAL - Abnormal; Notable for the following components:    Monocytes, Man % 8 (*)     Platelet Morphology Platelet Clumping (*)     All other components within normal limits   TROPONIN I - Normal   NT-PRO NATRIURETIC PEPTIDE - Normal   TROPONIN I - Normal   CBC W/ AUTO DIFFERENTIAL    Narrative:     The following orders were created for panel order CBC auto differential.  Procedure                               Abnormality         Status                     ---------                               -----------         ------                     CBC with Differential[[id number]]        Abnormal            Final result               Manual Differential[[id number]]          Abnormal            Final result                 Please view results for these tests on the individual orders.        ECG Results          EKG 12-lead (In process)  Result time 04/07/22 00:16:43    In process by Interface, Lab In [facility] (04/07/22 00:16:43)                 Narrative:    Test Reason : R07.9,    Vent. Rate : 074 BPM     Atrial Rate : 074 BPM      P-R Int : 150 ms          QRS Dur : 078 ms      QT Int : 384 ms       P-R-T Axes : 048 -05 016 degrees     QTc Int : 426 ms    Normal sinus rhythm  Normal ECG  When compared with ECG of 28-MAR-2022 10:33,  No significant change was found    Referred By: [email or web address]   SELF           Confirmed By:                             Imaging Results          X-Ray Chest 1 View (In process)                  Medications   sodium chloride 0.9% bolus 1,000 mL (1,000 mLs Intravenous New Bag 4/7/22 0038)     Medical Decision Making:   ED Management:  -2237:  BUN 21 and Cr 1.25  INT attempt unsuccessful earlier.  Will place INT with 2nd Troponin draw and administer 1 L NS  -2237:  BNP 88 and Troponin 5.0  Will repeat Troponin at 0035   -0104:  Repeat troponin 5.9  -Pt had chemical stress test 4-4-22 that was negative.  -Pt states pain between shoulder blades has eased up and BP is better.  She denies SOB.  -Will discharge home after IVF and have pt to call Dr. Zavala tomorrow notifying him of ED visit.  Pt agrees to call Dr. Zavala in the morning.                   Clinical Impression:   Final diagnoses:  [R07.9] Chest pain          ED Disposition Condition    Discharge Stable        ED Prescriptions     None        Follow-up Information     Follow up With Specialties Details Why Contact Info    Dr. Zavala   Call Dr. Zavala later this morning to notify him of ED visit            DELON Fu  04/06/22 [id number]       DELON Fu  04/07/22 0021       DELON Fu  04/07/22 0115

## 2022-04-07 NOTE — TELEPHONE ENCOUNTER
Ms Aguilera called. Spoke to Ms Aguilera and her . She explained she went to ER last night. Shoulder blade pain, her blood pressure would go up and down while she was at the hospital. She did have some SOB yesterday but not today. The pain is better today however it is still dull. She denies other pain, states it is  between her shoulder blades.     Dr. Shane suggested referral to GI. I ordered the referral.  Patient states she has already had all those test she does not feel it is anything to do with GI. She has an appointment later this month with Dr. Shane. Would like to keep that appointment.  She stated if she had any more pain she would go back to the emergency room.

## 2022-04-08 ENCOUNTER — TELEPHONE (OUTPATIENT)
Dept: EMERGENCY MEDICINE | Facility: HOSPITAL | Age: 72
End: 2022-04-08
Payer: MEDICARE

## 2022-04-26 ENCOUNTER — OFFICE VISIT (OUTPATIENT)
Dept: CARDIOLOGY | Facility: CLINIC | Age: 72
End: 2022-04-26
Payer: MEDICARE

## 2022-04-26 VITALS
BODY MASS INDEX: 34.96 KG/M2 | SYSTOLIC BLOOD PRESSURE: 122 MMHG | HEIGHT: 69 IN | WEIGHT: 236 LBS | DIASTOLIC BLOOD PRESSURE: 62 MMHG | OXYGEN SATURATION: 95 % | HEART RATE: 78 BPM

## 2022-04-26 DIAGNOSIS — I20.0 UNSTABLE ANGINA PECTORIS: ICD-10-CM

## 2022-04-26 DIAGNOSIS — I25.110 ATHEROSCLEROSIS OF NATIVE CORONARY ARTERY OF NATIVE HEART WITH UNSTABLE ANGINA PECTORIS: Primary | ICD-10-CM

## 2022-04-26 PROCEDURE — 99214 PR OFFICE/OUTPT VISIT, EST, LEVL IV, 30-39 MIN: ICD-10-PCS | Mod: S$PBB,,, | Performed by: INTERNAL MEDICINE

## 2022-04-26 PROCEDURE — 99214 OFFICE O/P EST MOD 30 MIN: CPT | Mod: S$PBB,,, | Performed by: INTERNAL MEDICINE

## 2022-04-26 PROCEDURE — 99214 OFFICE O/P EST MOD 30 MIN: CPT | Mod: PBBFAC | Performed by: INTERNAL MEDICINE

## 2022-04-26 NOTE — PROGRESS NOTES
PCP: ANKUSH Hunter    Referring Provider:     Subjective:   Marci Aguilera is a 71 y.o. female with hx of nonsmoker, with hx of CAD s/p PCI to LCx in 2018, diabetes, DVT, HTN, HLD,  who presents for evaluation of intrascapular pain and shortness of breath.      Patient states she had episode of diaphoresis and weakness 3 days ago that occurred at rest, and since that time has had pain between shoulder blades.  She has not taken nitroglycerin.  She states she was unable to tolerate Imdur.     Family history of heart disease, mother and father     EKG  3/28/22:  NSR, cannot rule out anterior infarct, age undetermined            10/2/2020:  Sinus rhythm. Low QRS voltages in precordial leads   ECHO  6/7/21:   Normal left ventricular cavity with overall normal systolic function.  EF 60%.                Mild to mod tricuspid regurgitation                LHC 8/20/20, Davin Orantes's, stent to distal LCx.             6/7/21:  Nonobstructive CAD.                         Mildly elevated LVEDP 15 mmHg    Lexiscan stress test 3/10/21 - [facility]: Normal                                 Past Surgical History:   Procedure Laterality Date    CATARACT EXTRACTION      CHOLECYSTECTOMY      CORONARY STENT PLACEMENT      per  2020    FLUOROSCOPIC URODYNAMIC STUDY  10/28/2019    interpreted on 10/29/2019 by Dr. Rudolph Bocanegra;  bladder pain during filling at 320cc,  urodynamic stress incontinence at 302cc without ISD,  no evidence of OAB symtomatology    HIP REPLACEMENT ARTHROPLASTY      JOINT REPLACEMENT      LEFT HEART CATHETERIZATION N/A 6/7/2021    Procedure: Left heart cath with possible intervention;  Surgeon: Chris Dave MD;  Location: [facility] CATH LAB;  Service: Cardiology;  Laterality: N/A;    rotator cuff      TOTAL ABDOMINAL HYSTERECTOMY      Transobtruator Tape Urethral Sling procedure and limited Cystoscopy  01/29/2020    at [facility];  no complications       Family History   Problem Relation Age of Onset    Pancreatic cancer Paternal Grandmother     Heart disease Father     Heart disease Mother     Uterine cancer Sister     Heart disease Sister     Cancer Sister     Stroke Brother       Social History     Socioeconomic History    Marital status:    Tobacco Use    Smoking status: Never Smoker    Smokeless tobacco: Never Used   Substance and Sexual Activity    Alcohol use: Yes     Comment: occassional    Drug use: Not Currently     Types: Flunitrazepam    Sexual activity: Yes        Lab Results   Component Value Date     04/06/2022    K 4.3 04/06/2022     04/06/2022    CO2 24 04/06/2022    BUN 21 (H) 04/06/2022    CREATININE 1.25 (H) 04/06/2022    CALCIUM 8.3 (L) 04/06/2022    ANIONGAP 15 04/06/2022    ESTGFRAFRICA 91 10/02/2020    EGFRNONAA 45 (L) 04/06/2022       No results found for: CHOL  No results found for: HDL  No results found for: LDLCALC  No results found for: TRIG  No results found for: CHOLHDL    Lab Results   Component Value Date    WBC 7.19 04/06/2022    HGB 13.0 04/06/2022    HCT 40.0 04/06/2022    MCV 89.9 04/06/2022    PLT 51 (L) 04/06/2022           Current Outpatient Medications:     albuterol (VENTOLIN HFA) 90 mcg/actuation inhaler, Inhale 2 puffs into the lungs every 6 (six) hours as needed for Wheezing. Rescue, Disp: 18 g, Rfl: 5    aspirin (ECOTRIN) 81 MG EC tablet, Take 81 mg by mouth once daily., Disp: , Rfl:     budesonide-formoterol 160-4.5 mcg (SYMBICORT) 160-4.5 mcg/actuation HFAA, Inhale 2 puffs into the lungs every 12 (twelve) hours. Controller (Patient not taking: No sig reported), Disp: 10.2 g, Rfl: 5    fluticasone furoate (ARNUITY ELLIPTA) 200 mcg/actuation inhaler, Inhale 1 puff into the lungs once daily., Disp: 30 each, Rfl: 5    nitrofurantoin, macrocrystal-monohydrate, (MACROBID) 100 MG capsule, Take 1 capsule (100 mg total) by mouth 2 (two) times daily., Disp: 14 capsule, Rfl: 0     "pantoprazole (PROTONIX) 40 MG tablet, Take 1 tablet (40 mg total) by mouth once daily., Disp: 30 tablet, Rfl: 11    vitamin D (VITAMIN D3) 1000 units Tab, Take 1,000 Units by mouth once daily., Disp: , Rfl:        Review of Systems   Constitutional: Positive for malaise/fatigue.   Respiratory: Negative for cough and shortness of breath.    Cardiovascular: Negative for chest pain, palpitations, orthopnea, claudication, leg swelling and PND.         Objective:   /62 (BP Location: Left arm, Patient Position: Sitting)   Pulse 78   Ht 5' 9" (1.753 m)   Wt 107 kg (236 lb)   SpO2 95%   BMI 34.85 kg/m²     Physical Exam  Eyes:      General: No scleral icterus.  Neck:      Vascular: No carotid bruit.   Cardiovascular:      Rate and Rhythm: Normal rate and regular rhythm.      Pulses: Normal pulses.      Heart sounds: Normal heart sounds. No murmur heard.  Pulmonary:      Effort: Pulmonary effort is normal.      Breath sounds: Normal breath sounds. No wheezing or rales.   Musculoskeletal:         General: No swelling.   Neurological:      Mental Status: She is alert and oriented to person, place, and time.           Assessment:     1. Atherosclerosis of native coronary artery of native heart with unstable angina pectoris           Plan:     Problem List Items Addressed This Visit        Cardiac/Vascular    Atherosclerosis of native coronary artery of native heart with unstable angina pectoris - Primary           # coronary artery disease  [facility] 6/7/21:  Nonobstructive CAD,  Mildly elevated LVEDP  15 mmHg    She has some ISR in her small LCx stent. LAD has intermediate lesions 50%.  She underwent a recent LHC which showed no significant stenosis.  A SPECT was done which was negative for ischemia  She has a hiatal hernia  Unclear if her symptoms are GI related (recent esophageal dilatation) vs microvascular angina    She is unable to take imdur as that made her very weak.  We discussed ranexa    She wants to try " metoprolol alone at this time.    #Thrombocytopenia  Recheck CBC    FUP in 3 months

## 2022-06-23 ENCOUNTER — HOSPITAL ENCOUNTER (EMERGENCY)
Facility: HOSPITAL | Age: 72
Discharge: HOME | End: 2022-06-23
Payer: MEDICARE

## 2022-06-23 VITALS
HEIGHT: 68 IN | BODY MASS INDEX: 35.31 KG/M2 | SYSTOLIC BLOOD PRESSURE: 136 MMHG | DIASTOLIC BLOOD PRESSURE: 70 MMHG | RESPIRATION RATE: 19 BRPM | TEMPERATURE: 98 F | WEIGHT: 233 LBS | HEART RATE: 77 BPM | OXYGEN SATURATION: 98 %

## 2022-06-23 DIAGNOSIS — J06.9 VIRAL URI: Primary | ICD-10-CM

## 2022-06-23 LAB
FLUAV AG UPPER RESP QL IA.RAPID: NEGATIVE
FLUBV AG UPPER RESP QL IA.RAPID: NEGATIVE
SARS-COV+SARS-COV-2 AG RESP QL IA.RAPID: NEGATIVE

## 2022-06-23 PROCEDURE — 63600175 PHARM REV CODE 636 W HCPCS: Performed by: PHYSICIAN ASSISTANT

## 2022-06-23 PROCEDURE — 99284 EMERGENCY DEPT VISIT MOD MDM: CPT

## 2022-06-23 PROCEDURE — 96372 THER/PROPH/DIAG INJ SC/IM: CPT

## 2022-06-23 PROCEDURE — 87428 SARSCOV & INF VIR A&B AG IA: CPT | Performed by: FAMILY MEDICINE

## 2022-06-23 PROCEDURE — 99283 EMERGENCY DEPT VISIT LOW MDM: CPT | Mod: GF | Performed by: PHYSICIAN ASSISTANT

## 2022-06-23 RX ORDER — KETOROLAC TROMETHAMINE 30 MG/ML
30 INJECTION, SOLUTION INTRAMUSCULAR; INTRAVENOUS
Status: COMPLETED | OUTPATIENT
Start: 2022-06-23 | End: 2022-06-23

## 2022-06-23 RX ORDER — METHYLPREDNISOLONE SOD SUCC 125 MG
125 VIAL (EA) INJECTION
Status: COMPLETED | OUTPATIENT
Start: 2022-06-23 | End: 2022-06-23

## 2022-06-23 RX ORDER — PREDNISONE 50 MG/1
50 TABLET ORAL DAILY
Qty: 5 TABLET | Refills: 0 | Status: SHIPPED | OUTPATIENT
Start: 2022-06-23 | End: 2022-12-20

## 2022-06-23 RX ORDER — AZITHROMYCIN 250 MG/1
TABLET, FILM COATED ORAL
Qty: 6 TABLET | Refills: 0 | Status: SHIPPED | OUTPATIENT
Start: 2022-06-23 | End: 2022-06-23 | Stop reason: SDUPTHER

## 2022-06-23 RX ORDER — AZITHROMYCIN 250 MG/1
TABLET, FILM COATED ORAL
Qty: 6 TABLET | Refills: 0 | Status: SHIPPED | OUTPATIENT
Start: 2022-06-23 | End: 2022-06-28

## 2022-06-23 RX ADMIN — KETOROLAC TROMETHAMINE 30 MG: 30 INJECTION, SOLUTION INTRAMUSCULAR at 06:06

## 2022-06-23 RX ADMIN — METHYLPREDNISOLONE SODIUM SUCCINATE 125 MG: 125 INJECTION, POWDER, FOR SOLUTION INTRAMUSCULAR; INTRAVENOUS at 06:06

## 2022-06-23 NOTE — ED PROVIDER NOTES
Encounter Date: 6/23/2022       History     Chief Complaint   Patient presents with    COVID-19 Concerns     Patient is a 71-year-old female with history of cough, and low-grade fever for the past day.  She went to the hospital with her , and is afraid she may have come in contact with somebody with COVID.        Review of patient's allergies indicates:   Allergen Reactions    Statins-hmg-coa reductase inhibitors Other (See Comments)     Refuses to take due to causing muscle aches and joint pain    Codeine     Diphtheria toxin preparations     Dristan allergy Other (See Comments)     hallucinations    Tetanus vaccines and toxoid Other (See Comments)     Arm pain and swelling    Penicillins Rash     Past Medical History:   Diagnosis Date    Atrophic vaginitis 05/12/2004    Coronary artery disease     Cystitis 02/2004    Deep vein thrombosis     DVT (deep venous thrombosis)     Esophageal dysphagia 12/27/2021    Esophagitis 12/27/2021    HH (hiatus hernia) 12/27/2021    Pulmonary embolus     after Septic ~ 2019    Thyroid disease     Vitamin D deficiency      Past Surgical History:   Procedure Laterality Date    CATARACT EXTRACTION      CHOLECYSTECTOMY      CORONARY STENT PLACEMENT      per  2020    FLUOROSCOPIC URODYNAMIC STUDY  10/28/2019    interpreted on 10/29/2019 by Dr. Rudolph Bocanegra;  bladder pain during filling at 320cc,  urodynamic stress incontinence at 302cc without ISD,  no evidence of OAB symtomatology    HIP REPLACEMENT ARTHROPLASTY      JOINT REPLACEMENT      LEFT HEART CATHETERIZATION N/A 6/7/2021    Procedure: Left heart cath with possible intervention;  Surgeon: Chris Dave MD;  Location: [facility] CATH LAB;  Service: Cardiology;  Laterality: N/A;    rotator cuff      TOTAL ABDOMINAL HYSTERECTOMY      Transobtruator Tape Urethral Sling procedure and limited Cystoscopy  01/29/2020    at [facility];  no complications      Family History   Problem Relation Age of Onset    Pancreatic cancer Paternal Grandmother     Heart disease Father     Heart disease Mother     Uterine cancer Sister     Heart disease Sister     Cancer Sister     Stroke Brother      Social History     Tobacco Use    Smoking status: Never Smoker    Smokeless tobacco: Never Used   Substance Use Topics    Alcohol use: Yes     Comment: occassional    Drug use: Not Currently     Types: Flunitrazepam     Review of Systems   Constitutional: Positive for fever.   HENT: Positive for congestion.    Respiratory: Positive for cough.        Physical Exam     Initial Vitals [06/23/22 1819]   BP Pulse Resp Temp SpO2   (!) 164/86 83 20 98.4 °F (36.9 °C) 96 %      MAP       --         Physical Exam    Nursing note and vitals reviewed.  Constitutional: She appears well-nourished. No distress.   HENT:   Head: Atraumatic.   Eyes: EOM are normal.   Neck: Neck supple.   Normal range of motion.  Cardiovascular: Normal rate, regular rhythm and normal heart sounds.   Pulmonary/Chest: Breath sounds normal.   Abdominal: Abdomen is soft. Bowel sounds are normal.   Musculoskeletal:         General: No edema.      Cervical back: Normal range of motion and neck supple.     Neurological: She is alert and oriented to person, place, and time.   Skin: Skin is dry.   Psychiatric: She has a normal mood and affect.         Medical Screening Exam   See Full Note    ED Course   Procedures  Labs Reviewed   SARS-COV2 (COVID) W/ FLU ANTIGEN - Normal    Narrative:     Negative SARS-CoV results should not be used as the sole basis for treatment or patient management decisions; negative results should be considered in the context of a patient's recent exposures, history and the presene of clinical signs and symptoms consistent with COVID-19.  Negative results should be treated as presumptive and confirmed by molecular assay, if necessary for patient management.          Imaging Results    None           Medications   ketorolac injection 30 mg (30 mg Intramuscular Given 6/23/22 1848)   methylPREDNISolone sodium succinate injection 125 mg (125 mg Intramuscular Given 6/23/22 1848)                 ED Course as of 06/23/22 1858   Thu Jun 23, 2022 1834 Patient will get Toradol, and Solu-Medrol.  I will send her home with a Z-Esperanza, and prednisone. [CB]      ED Course User Index  [CB] JONAS Marques          Clinical Impression:   Final diagnoses:  [J06.9] Viral URI (Primary)          ED Disposition Condition    Discharge Stable        ED Prescriptions     Medication Sig Dispense Start Date End Date Auth. Provider    predniSONE (DELTASONE) 50 MG Tab Take 1 tablet (50 mg total) by mouth once daily. 5 tablet 6/23/2022  JONAS Marques    azithromycin (Z-ESPERANZA) 250 MG tablet  (Status: Discontinued) Take 2 tablets by mouth on day 1; Take 1 tablet by mouth on days 2-5 6 tablet 6/23/2022 6/23/2022 JONAS Marques    azithromycin (Z-ESPERANZA) 250 MG tablet Take 2 tablets by mouth on day 1; Take 1 tablet by mouth on days 2-5 6 tablet 6/23/2022 6/28/2022 JONAS Marques        Follow-up Information    None          JONAS Marques  06/23/22 1858

## 2022-07-13 ENCOUNTER — TELEPHONE (OUTPATIENT)
Dept: GASTROENTEROLOGY | Facility: CLINIC | Age: 72
End: 2022-07-13
Payer: MEDICARE

## 2022-07-13 DIAGNOSIS — K21.9 GASTROESOPHAGEAL REFLUX DISEASE, UNSPECIFIED WHETHER ESOPHAGITIS PRESENT: Primary | ICD-10-CM

## 2022-08-15 ENCOUNTER — ANESTHESIA (OUTPATIENT)
Dept: GASTROENTEROLOGY | Facility: HOSPITAL | Age: 72
End: 2022-08-15
Payer: MEDICARE

## 2022-08-15 ENCOUNTER — HOSPITAL ENCOUNTER (OUTPATIENT)
Dept: GASTROENTEROLOGY | Facility: HOSPITAL | Age: 72
Discharge: HOME | End: 2022-08-15
Attending: INTERNAL MEDICINE
Payer: MEDICARE

## 2022-08-15 ENCOUNTER — ANESTHESIA EVENT (OUTPATIENT)
Dept: GASTROENTEROLOGY | Facility: HOSPITAL | Age: 72
End: 2022-08-15
Payer: MEDICARE

## 2022-08-15 VITALS
SYSTOLIC BLOOD PRESSURE: 103 MMHG | TEMPERATURE: 98 F | HEIGHT: 68 IN | DIASTOLIC BLOOD PRESSURE: 44 MMHG | HEART RATE: 59 BPM | RESPIRATION RATE: 15 BRPM | OXYGEN SATURATION: 97 % | WEIGHT: 230 LBS | BODY MASS INDEX: 34.86 KG/M2

## 2022-08-15 DIAGNOSIS — R13.19 ESOPHAGEAL DYSPHAGIA: ICD-10-CM

## 2022-08-15 DIAGNOSIS — K29.00 ACUTE SUPERFICIAL GASTRITIS WITHOUT HEMORRHAGE: ICD-10-CM

## 2022-08-15 DIAGNOSIS — K44.9 HH (HIATUS HERNIA): ICD-10-CM

## 2022-08-15 DIAGNOSIS — K21.9 GASTROESOPHAGEAL REFLUX DISEASE, UNSPECIFIED WHETHER ESOPHAGITIS PRESENT: ICD-10-CM

## 2022-08-15 DIAGNOSIS — Z01.818 PREOPERATIVE TESTING: Primary | ICD-10-CM

## 2022-08-15 LAB
CTP QC/QA: YES
SARS-COV-2 AG RESP QL IA.RAPID: NEGATIVE

## 2022-08-15 PROCEDURE — 37000009 HC ANESTHESIA EA ADD 15 MINS

## 2022-08-15 PROCEDURE — 27000716 HC OXISENSOR PROBE, ANY SIZE

## 2022-08-15 PROCEDURE — 43239 EGD BIOPSY SINGLE/MULTIPLE: CPT

## 2022-08-15 PROCEDURE — D9220A PRA ANESTHESIA: ICD-10-PCS | Mod: ,,,

## 2022-08-15 PROCEDURE — 37000008 HC ANESTHESIA 1ST 15 MINUTES

## 2022-08-15 PROCEDURE — D9220A PRA ANESTHESIA: Mod: ,,,

## 2022-08-15 PROCEDURE — 27201423 OPTIME MED/SURG SUP & DEVICES STERILE SUPPLY

## 2022-08-15 PROCEDURE — 27000284 HC CANNULA NASAL

## 2022-08-15 PROCEDURE — 25000003 PHARM REV CODE 250

## 2022-08-15 PROCEDURE — 43450 DILATE ESOPHAGUS 1/MULT PASS: CPT

## 2022-08-15 PROCEDURE — 63600175 PHARM REV CODE 636 W HCPCS

## 2022-08-15 RX ORDER — LIDOCAINE HYDROCHLORIDE 20 MG/ML
INJECTION INTRAVENOUS
Status: DISCONTINUED | OUTPATIENT
Start: 2022-08-15 | End: 2022-08-15

## 2022-08-15 RX ORDER — SODIUM CHLORIDE 0.9 % (FLUSH) 0.9 %
10 SYRINGE (ML) INJECTION
Status: CANCELLED | OUTPATIENT
Start: 2022-08-15

## 2022-08-15 RX ORDER — PROPOFOL 10 MG/ML
VIAL (ML) INTRAVENOUS
Status: DISCONTINUED | OUTPATIENT
Start: 2022-08-15 | End: 2022-08-15

## 2022-08-15 RX ADMIN — PROPOFOL 100 MG: 10 INJECTION, EMULSION INTRAVENOUS at 02:08

## 2022-08-15 RX ADMIN — LIDOCAINE HYDROCHLORIDE 100 MG: 20 INJECTION, SOLUTION INTRAVENOUS at 02:08

## 2022-08-15 RX ADMIN — PROPOFOL 30 MG: 10 INJECTION, EMULSION INTRAVENOUS at 02:08

## 2022-08-15 RX ADMIN — SODIUM CHLORIDE: 9 INJECTION, SOLUTION INTRAVENOUS at 02:08

## 2022-08-15 RX ADMIN — PROPOFOL 50 MG: 10 INJECTION, EMULSION INTRAVENOUS at 02:08

## 2022-08-15 NOTE — DISCHARGE INSTRUCTIONS
Procedure Date  8/15/22     Impression  Overall Impression: Mucosa of the esophagus is normal with z-line at 36cm. Distal esophageal biopsies were obtained and the esophagus was dilated with a 48F Nolasco. The  stomach has gastritis without ulcers and biopsies were obtained. Mucosa of the duodenum was normal.     Recommendation  Await pathology results; avoid nsaids; ppi 1/AM; repeat dilation prn. Offer pt a screening colonoscopy.    NO DRIVING, OPERATING EQUIPMENT, OR SIGNING LEGAL DOCUMENTS FOR 24 HOURS.  THE NURSE WILL CALL YOU WITH YOUR BIOPSY RESULTS IN A FEW DAYS.

## 2022-08-15 NOTE — ANESTHESIA PREPROCEDURE EVALUATION
08/15/2022  Marci Aguilera is a 71 y.o., female.    Current Outpatient Medications   Medication Sig    albuterol (VENTOLIN HFA) 90 mcg/actuation inhaler Inhale 2 puffs into the lungs every 6 (six) hours as needed for Wheezing. Rescue    aspirin (ECOTRIN) 81 MG EC tablet Take 81 mg by mouth once daily.    budesonide-formoterol 160-4.5 mcg (SYMBICORT) 160-4.5 mcg/actuation HFAA Inhale 2 puffs into the lungs every 12 (twelve) hours. Controller (Patient not taking: No sig reported)    fluticasone furoate (ARNUITY ELLIPTA) 200 mcg/actuation inhaler Inhale 1 puff into the lungs once daily.    nitrofurantoin, macrocrystal-monohydrate, (MACROBID) 100 MG capsule Take 1 capsule (100 mg total) by mouth 2 (two) times daily.    pantoprazole (PROTONIX) 40 MG tablet Take 1 tablet (40 mg total) by mouth once daily.    predniSONE (DELTASONE) 50 MG Tab Take 1 tablet (50 mg total) by mouth once daily.    vitamin D (VITAMIN D3) 1000 units Tab Take 1,000 Units by mouth once daily.     No current facility-administered medications for this encounter.     Active Ambulatory Problems     Diagnosis Date Noted    Atherosclerosis of native coronary artery of native heart with unstable angina pectoris 05/26/2021    Chest pain 06/07/2021    Positive D-dimer 06/07/2021    Shortness of breath 06/28/2021    Esophageal dysphagia 12/27/2021    Esophagitis 12/27/2021    HH (hiatus hernia) 12/27/2021     Resolved Ambulatory Problems     Diagnosis Date Noted    ACS (acute coronary syndrome) 06/06/2021     Past Medical History:   Diagnosis Date    Atrophic vaginitis 05/12/2004    Coronary artery disease     Cystitis 02/2004    Deep vein thrombosis     DVT (deep venous thrombosis)     Pulmonary embolus     Thyroid disease     Vitamin D deficiency      Past Surgical History:   Procedure Laterality Date    CATARACT  EXTRACTION      CHOLECYSTECTOMY      CORONARY STENT PLACEMENT      per  2020    FLUOROSCOPIC URODYNAMIC STUDY  10/28/2019    interpreted on 10/29/2019 by Dr. Rudolph Bocanegra;  bladder pain during filling at 320cc,  urodynamic stress incontinence at 302cc without ISD,  no evidence of OAB symtomatology    HIP REPLACEMENT ARTHROPLASTY      JOINT REPLACEMENT      LEFT HEART CATHETERIZATION N/A 6/7/2021    Procedure: Left heart cath with possible intervention;  Surgeon: Chris Dave MD;  Location: [facility] CATH LAB;  Service: Cardiology;  Laterality: N/A;    rotator cuff      TOTAL ABDOMINAL HYSTERECTOMY      Transobtruator Tape Urethral Sling procedure and limited Cystoscopy  01/29/2020    at [facility];  no complications       Pre-op Assessment    I have reviewed the Patient Summary Reports.     I have reviewed the Nursing Notes. I have reviewed the NPO Status.   I have reviewed the Medications.     Review of Systems  Anesthesia Hx:  No problems with previous Anesthesia  Denies Family Hx of Anesthesia complications.   Denies Personal Hx of Anesthesia complications.   Social:  Non-Smoker, Social Alcohol Use    Hematology/Oncology:  Hematology Normal   Oncology Normal     EENT/Dental:EENT/Dental Normal   Cardiovascular:   CAD asymptomatic CABG/stent (stent placed at [facility] by Dr Barajas in 2021)   Denies Angina. CHF ECG has been reviewed.    Pulmonary:   Asthma mild Shortness of breath (with exertion )    Renal/:  Renal/ Normal     Hepatic/GI:   Hiatal Hernia, GERD, well controlled Esophageal dysphagia   Musculoskeletal:   Arthritis     Neurological:  Neurology Normal    Endocrine:  Obesity / BMI > 30  Dermatological:  Skin Normal    Psych:  Psychiatric Normal       Results for orders placed during the hospital encounter of 06/06/21    Echo    Interpretation Summary  · The left ventricle is normal in size with normal systolic function.  · The estimated ejection  fraction is 60%.  · Normal left ventricular diastolic function.  · Normal right ventricular size.  · Mild mitral regurgitation.  · Mild to moderate tricuspid regurgitation.  · Normal central venous pressure (3 mmHg).  · The estimated PA systolic pressure is 32 mmHg.  · Mild pulmonic regurgitation.    Results for orders placed or performed during the hospital encounter of 04/06/22   EKG 12-lead    Collection Time: 04/06/22 10:22 PM    Narrative    Test Reason : R07.9,    Vent. Rate : 074 BPM     Atrial Rate : 074 BPM     P-R Int : 150 ms          QRS Dur : 078 ms      QT Int : 384 ms       P-R-T Axes : 048 -05 016 degrees     QTc Int : 426 ms    Normal sinus rhythm  Normal ECG  When compared with ECG of 28-MAR-2022 10:33,  No significant change was found  Confirmed by Stew Spicer DO ([id number]) on 4/11/2022 1:51:03 PM    Referred By: [email or web address]   SELF           Confirmed By:Stew Spicer DO       Physical Exam  General: Well nourished, Cooperative, Alert and Oriented    Airway:  Mallampati: III / II  Mouth Opening: Small, but > 3cm  TM Distance: Normal  Tongue: Large  Neck ROM: Normal ROM    Dental:  Intact    Chest/Lungs:  Normal Respiratory Rate        Anesthesia Plan  Type of Anesthesia, risks & benefits discussed:    Anesthesia Type: Gen Natural Airway  Intra-op Monitoring Plan: Standard ASA Monitors  Post Op Pain Control Plan: multimodal analgesia  Induction:  IV  Informed Consent: Informed consent signed with the Patient and all parties understand the risks and agree with anesthesia plan.  All questions answered. Patient consented to blood products? Yes  ASA Score: 3  Day of Surgery Review of History & Physical: H&P Update referred to the surgeon/provider.I have interviewed and examined the patient. I have reviewed the patient's H&P dated: There are no significant changes.     Ready For Surgery From Anesthesia Perspective.     .

## 2022-08-15 NOTE — H&P
[facility]  Gastroenterology  H&P    Patient Name: Marci Aguilera  MRN: [id number]  Admission Date: 8/15/2022  Code Status: Prior    Attending Provider: Jose Edgar MD  Primary Care Physician: ANKUSH Hunter  Principal Problem:<principal problem not specified>    Subjective:     History of Present Illness: Pt has gerd, esophageal dysphagia; for egd with dilation.    Past Medical History:   Diagnosis Date    Atrophic vaginitis 05/12/2004    Coronary artery disease     Cystitis 02/2004    Deep vein thrombosis     DVT (deep venous thrombosis)     Esophageal dysphagia 12/27/2021    Esophagitis 12/27/2021    HH (hiatus hernia) 12/27/2021    Pulmonary embolus     after Septic ~ 2019    Thyroid disease     Vitamin D deficiency        Past Surgical History:   Procedure Laterality Date    CATARACT EXTRACTION      CHOLECYSTECTOMY      CORONARY STENT PLACEMENT      per  2020    FLUOROSCOPIC URODYNAMIC STUDY  10/28/2019    interpreted on 10/29/2019 by Dr. Rudolph Bocanegra;  bladder pain during filling at 320cc,  urodynamic stress incontinence at 302cc without ISD,  no evidence of OAB symtomatology    HIP REPLACEMENT ARTHROPLASTY      JOINT REPLACEMENT      LEFT HEART CATHETERIZATION N/A 6/7/2021    Procedure: Left heart cath with possible intervention;  Surgeon: Chris Dave MD;  Location: [facility] CATH LAB;  Service: Cardiology;  Laterality: N/A;    rotator cuff      TOTAL ABDOMINAL HYSTERECTOMY      Transobtruator Tape Urethral Sling procedure and limited Cystoscopy  01/29/2020    at [facility];  no complications       Review of patient's allergies indicates:   Allergen Reactions    Statins-hmg-coa reductase inhibitors Other (See Comments)     Refuses to take due to causing muscle aches and joint pain    Codeine     Diphtheria toxin preparations     Dristan allergy Other (See Comments)     hallucinations    Tetanus vaccines and  toxoid Other (See Comments)     Arm pain and swelling    Penicillins Rash     Family History     Problem Relation (Age of Onset)    Cancer Sister    Heart disease Father, Mother, Sister    Pancreatic cancer Paternal Grandmother    Stroke Brother    Uterine cancer Sister        Tobacco Use    Smoking status: Never Smoker    Smokeless tobacco: Never Used   Substance and Sexual Activity    Alcohol use: Yes     Comment: occassional    Drug use: Not Currently     Types: Flunitrazepam    Sexual activity: Yes     Review of Systems   Respiratory: Negative.    Cardiovascular: Negative.    Gastrointestinal: Negative.      Objective:     Vital Signs (Most Recent):  Temp: 98.1 °F (36.7 °C) (08/15/22 1331)  Pulse: 66 (08/15/22 1331)  Resp: 12 (08/15/22 1331)  BP: 136/67 (08/15/22 1331)  SpO2: 95 % (08/15/22 1331) Vital Signs (24h Range):  Temp:  [98.1 °F (36.7 °C)] 98.1 °F (36.7 °C)  Pulse:  [66] 66  Resp:  [12] 12  SpO2:  [95 %] 95 %  BP: (136)/(67) 136/67     Weight: 104.3 kg (230 lb) (08/15/22 1331)  Body mass index is 34.97 kg/m².    No intake or output data in the 24 hours ending 08/15/22 1424    Lines/Drains/Airways     Peripheral Intravenous Line  Duration                Peripheral IV - Single Lumen 08/15/22 1330 22 G Anterior;Left Forearm <1 day                Physical Exam  Vitals reviewed.   Constitutional:       General: She is not in acute distress.     Appearance: Normal appearance. She is well-developed. She is obese. She is not ill-appearing.   HENT:      Head: Normocephalic and atraumatic.      Nose: Nose normal.   Eyes:      Pupils: Pupils are equal, round, and reactive to light.   Cardiovascular:      Rate and Rhythm: Normal rate and regular rhythm.   Pulmonary:      Effort: Pulmonary effort is normal.      Breath sounds: Normal breath sounds. No wheezing.   Abdominal:      General: Abdomen is flat. Bowel sounds are normal. There is no distension.      Palpations: Abdomen is soft.      Tenderness: There  is no abdominal tenderness. There is no guarding.   Skin:     General: Skin is warm and dry.      Coloration: Skin is not jaundiced.   Neurological:      Mental Status: She is alert.   Psychiatric:         Attention and Perception: Attention normal.         Mood and Affect: Affect normal.         Speech: Speech normal.         Behavior: Behavior is cooperative.      Comments: Pt was calm while speaking.         Significant Labs:  CBC: No results for input(s): WBC, HGB, HCT, PLT in the last 48 hours.  CMP: No results for input(s): GLU, CALCIUM, ALBUMIN, PROT, NA, K, CO2, CL, BUN, CREATININE, ALKPHOS, ALT, AST, BILITOT in the last 48 hours.    Significant Imaging:  Imaging results within the past 24 hours have been reviewed.    Assessment/Plan:     There are no hospital problems to display for this patient.        Imp: gerd, esophageal dysphagia  Plan: egd with dilation    Jose Edgar MD  Gastroenterology  [facility] End[facility]

## 2022-08-15 NOTE — ANESTHESIA POSTPROCEDURE EVALUATION
Anesthesia Post Evaluation    Patient: Marci Aguilera    Procedure(s) Performed: * EGD w/dilation     Final Anesthesia Type: general      Patient location during evaluation: GI PACU  Patient participation: Yes- Able to Participate  Level of consciousness: awake and alert  Post-procedure vital signs: reviewed and stable  Pain management: adequate  Airway patency: patent    PONV status at discharge: No PONV  Anesthetic complications: no      Cardiovascular status: blood pressure returned to baseline and hemodynamically stable  Respiratory status: spontaneous ventilation  Hydration status: euvolemic  Follow-up not needed.  Comments: Pt voices appreciation for care          Vitals Value Taken Time   /52 08/15/22 1506   Temp 97.8 F 08/15/22 1515   Pulse 61 08/15/22 1508   Resp 13 08/15/22 1507   SpO2 97 % 08/15/22 1508   Vitals shown include unvalidated device data.      Event Time   Out of Recovery 15:11:18         Pain/Didier Score: Didier Score: 10 (8/15/2022  3:10 PM)

## 2022-08-16 LAB
ESTROGEN SERPL-MCNC: NORMAL PG/ML
INSULIN SERPL-ACNC: NORMAL U[IU]/ML
LAB AP GROSS DESCRIPTION: NORMAL
LAB AP LABORATORY NOTES: NORMAL
T3RU NFR SERPL: NORMAL %

## 2022-08-17 ENCOUNTER — TELEPHONE (OUTPATIENT)
Dept: GASTROENTEROLOGY | Facility: CLINIC | Age: 72
End: 2022-08-17
Payer: MEDICARE

## 2022-08-17 NOTE — TELEPHONE ENCOUNTER
Attempted to call patient about results and left vm for a call back.      ----- Message from Jose Edgar MD sent at 8/16/2022  3:20 PM CDT -----  EGD bx shows gastritis and esophagitis without H.pylori.

## 2022-08-30 NOTE — PROGRESS NOTES
PCP: ANKUSH Hunter    Referring Provider:     Subjective:   Marci Aguilera is a 71 y.o. female with hx of nonsmoker, with hx of CAD s/p PCI to LCx in 2018, diabetes, DVT, HTN, HLD,  who presents for follow up.    Patient states she has continued episodes of weakness.  She reports lower extremity edema.  No other complaints.      Family history of heart disease, mother and father     EKG  3/28/22:  NSR, cannot rule out anterior infarct, age undetermined            10/2/2020:  Sinus rhythm. Low QRS voltages in precordial leads   ECHO  6/7/21:   Normal left ventricular cavity with overall normal systolic function.  EF 60%.                Mild to mod tricuspid regurgitation                LHC 8/20/20, [facility]in Orantes's, stent to distal LCx.             6/7/21:  Nonobstructive CAD.                         Mildly elevated LVEDP 15 mmHg    Lexiscan stress test 3/10/21 - [facility]: Normal                                 Past Surgical History:   Procedure Laterality Date    CATARACT EXTRACTION      CHOLECYSTECTOMY      CORONARY STENT PLACEMENT      per  2020    FLUOROSCOPIC URODYNAMIC STUDY  10/28/2019    interpreted on 10/29/2019 by Dr. Rudolph Bocanegra;  bladder pain during filling at 320cc,  urodynamic stress incontinence at 302cc without ISD,  no evidence of OAB symtomatology    HIP REPLACEMENT ARTHROPLASTY      JOINT REPLACEMENT      LEFT HEART CATHETERIZATION N/A 6/7/2021    Procedure: Left heart cath with possible intervention;  Surgeon: Chris Dave MD;  Location: [facility] CATH LAB;  Service: Cardiology;  Laterality: N/A;    rotator cuff      TOTAL ABDOMINAL HYSTERECTOMY      Transobtruator Tape Urethral Sling procedure and limited Cystoscopy  01/29/2020    at [facility];  no complications      Family History   Problem Relation Age of Onset    Pancreatic cancer Paternal Grandmother     Heart disease Father     Heart disease Mother     Uterine cancer Sister     Heart  disease Sister     Cancer Sister     Stroke Brother       Social History     Socioeconomic History    Marital status:    Tobacco Use    Smoking status: Never    Smokeless tobacco: Never   Substance and Sexual Activity    Alcohol use: Yes     Comment: occassional    Drug use: Not Currently     Types: Flunitrazepam    Sexual activity: Yes        Lab Results   Component Value Date     04/06/2022    K 4.3 04/06/2022     04/06/2022    CO2 24 04/06/2022    BUN 21 (H) 04/06/2022    CREATININE 1.25 (H) 04/06/2022    CALCIUM 8.3 (L) 04/06/2022    ANIONGAP 15 04/06/2022    ESTGFRAFRICA 91 10/02/2020    EGFRNONAA 45 (L) 04/06/2022       No results found for: CHOL  No results found for: HDL  No results found for: LDLCALC  No results found for: TRIG  No results found for: CHOLHDL    Lab Results   Component Value Date    WBC 6.71 04/26/2022    HGB 12.6 04/26/2022    HCT 37.9 (L) 04/26/2022    MCV 87.9 04/26/2022     04/26/2022           Current Outpatient Medications:     albuterol (VENTOLIN HFA) 90 mcg/actuation inhaler, Inhale 2 puffs into the lungs every 6 (six) hours as needed for Wheezing. Rescue, Disp: 18 g, Rfl: 5    aspirin (ECOTRIN) 81 MG EC tablet, Take 81 mg by mouth once daily., Disp: , Rfl:     budesonide-formoterol 160-4.5 mcg (SYMBICORT) 160-4.5 mcg/actuation HFAA, Inhale 2 puffs into the lungs every 12 (twelve) hours. Controller (Patient not taking: No sig reported), Disp: 10.2 g, Rfl: 5    fluticasone furoate (ARNUITY ELLIPTA) 200 mcg/actuation inhaler, Inhale 1 puff into the lungs once daily., Disp: 30 each, Rfl: 5    pantoprazole (PROTONIX) 40 MG tablet, Take 1 tablet (40 mg total) by mouth once daily., Disp: 30 tablet, Rfl: 11    predniSONE (DELTASONE) 50 MG Tab, Take 1 tablet (50 mg total) by mouth once daily., Disp: 5 tablet, Rfl: 0    vitamin D (VITAMIN D3) 1000 units Tab, Take 1,000 Units by mouth once daily., Disp: , Rfl:        Review of Systems   Respiratory:  Positive for  "shortness of breath. Negative for cough.    Cardiovascular:  Positive for leg swelling. Negative for chest pain, palpitations, orthopnea, claudication and PND.       Objective:   /82   Pulse 72   Resp 18   Ht 5' 8" (1.727 m)   Wt 107.5 kg (237 lb)   BMI 36.04 kg/m²     Physical Exam  Eyes:      General: No scleral icterus.  Neck:      Vascular: No carotid bruit.   Cardiovascular:      Rate and Rhythm: Normal rate and regular rhythm.      Pulses: Normal pulses.      Heart sounds: Normal heart sounds. No murmur heard.  Pulmonary:      Effort: Pulmonary effort is normal.      Breath sounds: Normal breath sounds. No wheezing or rales.   Musculoskeletal:         General: No swelling.   Neurological:      Mental Status: She is alert and oriented to person, place, and time.         Assessment:     1. Atherosclerosis of native coronary artery of native heart with unstable angina pectoris                Plan:     Problem List Items Addressed This Visit          Cardiac/Vascular    Atherosclerosis of native coronary artery of native heart with unstable angina pectoris    Current Assessment & Plan     [facility] 6/7/21:  Nonobstructive CAD,  Mildly elevated LVEDP  15 mmHg    She has some ISR in her small LCx stent. LAD has intermediate lesions 50%.  She underwent a recent LHC which showed no significant stenosis.  A SPECT was done which was negative for ischemia  She has a hiatal hernia  Unclear if her symptoms are GI related (recent esophageal dilatation) vs microvascular angina    On metoprolol              coronary artery disease  [facility] 6/7/21:  Nonobstructive CAD,  Mildly elevated LVEDP  15 mmHg    She has some ISR in her small LCx stent. LAD has intermediate lesions 50%.  She underwent a recent LHC which showed no significant stenosis.  A SPECT was done which was negative for ischemia  She has a hiatal hernia  Unclear if her symptoms are GI related (recent esophageal dilatation) vs microvascular angina    She is unable " to take imdur as that made her very weak.  We discussed ranexa    She wants to try metoprolol alone at this time.    Thrombocytopenia

## 2022-08-31 ENCOUNTER — OFFICE VISIT (OUTPATIENT)
Dept: CARDIOLOGY | Facility: CLINIC | Age: 72
End: 2022-08-31
Payer: MEDICARE

## 2022-08-31 VITALS
HEART RATE: 72 BPM | HEIGHT: 68 IN | WEIGHT: 237 LBS | RESPIRATION RATE: 18 BRPM | BODY MASS INDEX: 35.92 KG/M2 | SYSTOLIC BLOOD PRESSURE: 120 MMHG | DIASTOLIC BLOOD PRESSURE: 82 MMHG

## 2022-08-31 DIAGNOSIS — I25.110 ATHEROSCLEROSIS OF NATIVE CORONARY ARTERY OF NATIVE HEART WITH UNSTABLE ANGINA PECTORIS: ICD-10-CM

## 2022-08-31 PROCEDURE — 99213 PR OFFICE/OUTPT VISIT, EST, LEVL III, 20-29 MIN: ICD-10-PCS | Mod: S$PBB,,, | Performed by: STUDENT IN AN ORGANIZED HEALTH CARE EDUCATION/TRAINING PROGRAM

## 2022-08-31 PROCEDURE — 99213 OFFICE O/P EST LOW 20 MIN: CPT | Mod: PBBFAC | Performed by: STUDENT IN AN ORGANIZED HEALTH CARE EDUCATION/TRAINING PROGRAM

## 2022-08-31 PROCEDURE — 99213 OFFICE O/P EST LOW 20 MIN: CPT | Mod: S$PBB,,, | Performed by: STUDENT IN AN ORGANIZED HEALTH CARE EDUCATION/TRAINING PROGRAM

## 2022-08-31 NOTE — ASSESSMENT & PLAN NOTE
LHC 6/7/21:  Nonobstructive CAD,  Mildly elevated LVEDP  15 mmHg    She has some ISR in her small LCx stent. LAD has intermediate lesions 50%.  She underwent a recent LHC which showed no significant stenosis.  A SPECT was done which was negative for ischemia  She has a hiatal hernia  Unclear if her symptoms are GI related (recent esophageal dilatation) vs microvascular angina    On metoprolol

## 2022-10-09 DIAGNOSIS — Z71.89 COMPLEX CARE COORDINATION: ICD-10-CM

## 2022-11-22 ENCOUNTER — OFFICE VISIT (OUTPATIENT)
Dept: OBSTETRICS AND GYNECOLOGY | Facility: CLINIC | Age: 72
End: 2022-11-22
Payer: MEDICARE

## 2022-11-22 VITALS
RESPIRATION RATE: 16 BRPM | WEIGHT: 223.38 LBS | SYSTOLIC BLOOD PRESSURE: 139 MMHG | HEART RATE: 72 BPM | DIASTOLIC BLOOD PRESSURE: 82 MMHG | HEIGHT: 68 IN | TEMPERATURE: 97 F | BODY MASS INDEX: 33.85 KG/M2

## 2022-11-22 DIAGNOSIS — N32.81 OAB (OVERACTIVE BLADDER): Primary | ICD-10-CM

## 2022-11-22 DIAGNOSIS — N30.90 CYSTITIS: ICD-10-CM

## 2022-11-22 DIAGNOSIS — M89.319 CLAVICLE ENLARGEMENT: ICD-10-CM

## 2022-11-22 DIAGNOSIS — N95.2 VAGINITIS, ATROPHIC: ICD-10-CM

## 2022-11-22 LAB
BASOPHILS # BLD AUTO: 0.04 K/UL (ref 0–0.2)
BASOPHILS NFR BLD AUTO: 0.4 % (ref 0–1)
BILIRUB UR QL STRIP: NEGATIVE
CLARITY UR: CLEAR
COLOR UR: ABNORMAL
DIFFERENTIAL METHOD BLD: ABNORMAL
EOSINOPHIL # BLD AUTO: 0.11 K/UL (ref 0–0.5)
EOSINOPHIL NFR BLD AUTO: 1.1 % (ref 1–4)
ERYTHROCYTE [DISTWIDTH] IN BLOOD BY AUTOMATED COUNT: 13.6 % (ref 11.5–14.5)
GLUCOSE UR STRIP-MCNC: NORMAL MG/DL
HCT VFR BLD AUTO: 42.3 % (ref 38–47)
HGB BLD-MCNC: 14.1 G/DL (ref 12–16)
IMM GRANULOCYTES # BLD AUTO: 0.03 K/UL (ref 0–0.04)
IMM GRANULOCYTES NFR BLD: 0.3 % (ref 0–0.4)
KETONES UR STRIP-SCNC: NEGATIVE MG/DL
LEUKOCYTE ESTERASE UR QL STRIP: NEGATIVE
LYMPHOCYTES # BLD AUTO: 2.9 K/UL (ref 1–4.8)
LYMPHOCYTES NFR BLD AUTO: 28.6 % (ref 27–41)
MCH RBC QN AUTO: 29.8 PG (ref 27–31)
MCHC RBC AUTO-ENTMCNC: 33.3 G/DL (ref 32–36)
MCV RBC AUTO: 89.4 FL (ref 80–96)
MONOCYTES # BLD AUTO: 0.89 K/UL (ref 0–0.8)
MONOCYTES NFR BLD AUTO: 8.8 % (ref 2–6)
MPC BLD CALC-MCNC: 10.9 FL (ref 9.4–12.4)
NEUTROPHILS # BLD AUTO: 6.16 K/UL (ref 1.8–7.7)
NEUTROPHILS NFR BLD AUTO: 60.8 % (ref 53–65)
NITRITE UR QL STRIP: NEGATIVE
NRBC # BLD AUTO: 0 X10E3/UL
NRBC, AUTO (.00): 0 %
PH UR STRIP: 5.5 PH UNITS
PLATELET # BLD AUTO: 329 K/UL (ref 150–400)
PROT UR QL STRIP: 20
RBC # BLD AUTO: 4.73 M/UL (ref 4.2–5.4)
RBC # UR STRIP: NEGATIVE /UL
SP GR UR STRIP: 1.02
UROBILINOGEN UR STRIP-ACNC: NORMAL MG/DL
WBC # BLD AUTO: 10.13 K/UL (ref 4.5–11)

## 2022-11-22 PROCEDURE — 82270 PR BLOOD OCCULT, BY PEROX, FECES, SINGLE, COLORECT SCRN: ICD-10-PCS | Mod: ,,, | Performed by: OBSTETRICS & GYNECOLOGY

## 2022-11-22 PROCEDURE — 81003 URINALYSIS AUTO W/O SCOPE: CPT | Mod: QW,,, | Performed by: CLINICAL MEDICAL LABORATORY

## 2022-11-22 PROCEDURE — 85025 COMPLETE CBC W/AUTO DIFF WBC: CPT | Mod: ,,, | Performed by: CLINICAL MEDICAL LABORATORY

## 2022-11-22 PROCEDURE — 85025 CBC WITH DIFFERENTIAL: ICD-10-PCS | Mod: ,,, | Performed by: CLINICAL MEDICAL LABORATORY

## 2022-11-22 PROCEDURE — 36415 PR COLLECTION VENOUS BLOOD,VENIPUNCTURE: ICD-10-PCS | Mod: ,,, | Performed by: OBSTETRICS & GYNECOLOGY

## 2022-11-22 PROCEDURE — 99215 PR OFFICE/OUTPT VISIT, EST, LEVL V, 40-54 MIN: ICD-10-PCS | Mod: ,,, | Performed by: OBSTETRICS & GYNECOLOGY

## 2022-11-22 PROCEDURE — 82270 OCCULT BLOOD FECES: CPT | Mod: ,,, | Performed by: OBSTETRICS & GYNECOLOGY

## 2022-11-22 PROCEDURE — 36415 COLL VENOUS BLD VENIPUNCTURE: CPT | Mod: ,,, | Performed by: OBSTETRICS & GYNECOLOGY

## 2022-11-22 PROCEDURE — 99215 OFFICE O/P EST HI 40 MIN: CPT | Mod: ,,, | Performed by: OBSTETRICS & GYNECOLOGY

## 2022-11-22 PROCEDURE — 81003 URINALYSIS, REFLEX TO URINE CULTURE: ICD-10-PCS | Mod: QW,,, | Performed by: CLINICAL MEDICAL LABORATORY

## 2022-11-22 RX ORDER — ESTRADIOL 0.1 MG/G
1 CREAM VAGINAL
Qty: 42.5 G | Refills: 2 | Status: SHIPPED | OUTPATIENT
Start: 2022-11-22 | End: 2023-09-13 | Stop reason: SDUPTHER

## 2022-11-22 RX ORDER — TOLTERODINE 4 MG/1
4 CAPSULE, EXTENDED RELEASE ORAL DAILY
Qty: 30 CAPSULE | Refills: 11 | Status: SHIPPED | OUTPATIENT
Start: 2022-11-22 | End: 2022-12-20

## 2022-11-22 NOTE — PATIENT INSTRUCTIONS
Dr. Davey Ragsdale thanks you for this office visit at [facility].    Diagnosis for this visit:   Problem List Items Addressed This Visit    None  Visit Diagnoses       OAB (overactive bladder)    -  Primary    Vaginitis, atrophic        Cystitis                 The Plan: Urinalysis and urine culture; CBC; initiate tolterodine 4 mg daily; recommend InterStim testing; recommend topical vaginal estrogen            Follow-up in several weeks      Please practice best food and exercise habits for your age.    Dr. Davey Ragsdale recommends avoidance of smoking and illicit medications or habits.    Please call  or schedule for any change in your health.     Please keep the next scheduled appointment or call for any need to change the appointment.     Dr. Davey Ragsdale recommends yearly exams for good health maintenance.      Thank you    Dr. Davey Ragsdale  11/22/2022 4:12 PM

## 2022-11-22 NOTE — PROGRESS NOTES
Marci Aguilera female  for   Chief Complaint   Patient presents with    Urinary Frequency     Still having to use pads despite TOT Sling in 2020    Urinary Urgency      OB History    No obstetric history on file.          PHI:  72-year-old  4, para 4  female presents with possible 6 month history of daytime overactive bladder requiring her to wear perineal pad at all times.  Patient in 2020 underwent a trans obturator tape urethral sling procedure with relief of urinary stress incontinence.  On 10/28/2019 she did undergo urodynamic study that indicated stress incontinence at 302 cc without IST, at that time no evidence overactive bladder but evidence of some discomfort during bladder filling.    The patient denies hematuria.  She has been treated in the past for urinary tract infection.  She recently used some residual Bactrim to treat a possible bladder infection.    She is concerned about a lump on her right clavicle.  Patient had a left-sided thyroidectomy by Dr. Mayen at [facility].  There was no thyroid carcinoma.    Patient is on no oral, transdermal or topical vaginal estrogen therapy.  Patient is intermittently sexually active.  She denies dyspareunia.  The patient relates  has heart disorder and erectile dysfunction.    Past Medical History:   Diagnosis Date    Acute superficial gastritis without hemorrhage 8/15/2022    Atrophic vaginitis 2004    Coronary artery disease     Cystitis 2004    Deep vein thrombosis     DVT (deep venous thrombosis)     Esophageal dysphagia 2021    Esophagitis 2021    HH (hiatus hernia) 2021    Pulmonary embolus     after Septic ~ 2019    Thyroid disease     Vitamin D deficiency       Past Surgical History:   Procedure Laterality Date    CATARACT EXTRACTION      CHOLECYSTECTOMY      CORONARY STENT PLACEMENT      per      FLUOROSCOPIC URODYNAMIC STUDY  10/28/2019    interpreted on  "10/29/2019 by Dr. Rudolph Bocanegra;  bladder pain during filling at 320cc,  urodynamic stress incontinence at 302cc without ISD,  no evidence of OAB symtomatology    HIP REPLACEMENT ARTHROPLASTY      JOINT REPLACEMENT      LEFT HEART CATHETERIZATION N/A 6/7/2021    Procedure: Left heart cath with possible intervention;  Surgeon: Chris Dave MD;  Location: [facility] CATH LAB;  Service: Cardiology;  Laterality: N/A;    rotator cuff      TOTAL ABDOMINAL HYSTERECTOMY      Transobtruator Tape Urethral Sling procedure and limited Cystoscopy  01/29/2020    at [facility];  no complications      Review of patient's allergies indicates:   Allergen Reactions    Statins-hmg-coa reductase inhibitors Other (See Comments)     Refuses to take due to causing muscle aches and joint pain    Codeine     Diphtheria toxin preparations     Dristan allergy Other (See Comments)     hallucinations    Tetanus vaccines and toxoid Other (See Comments)     Arm pain and swelling    Penicillins Rash        ROS:Pertinent items are noted in HPI.    Physical exam:    /82   Pulse 72   Temp 96.9 °F (36.1 °C)   Resp 16   Ht 5' 8" (1.727 m)   Wt 101.3 kg (223 lb 6.4 oz)   BMI 33.97 kg/m²      General Appearance: healthy, alert, no distress, smiling, her BMI is 33.97               HEENT: Head: Normocephalic, no lesions, without obvious abnormality.  Possibly a slight callus of the medial aspect of her right clavicle.  Well-healed transverse scar from the partial left-sided thyroidectomy.  She has no lymphadenopathy the cervical nodes.    Neuro: normal exam    Lymphatic: no palpable lymphadenopathy, no hepatosplenomegaly    Chest:            Breast:  No breast exam            Lung: chest clear, no wheezing, rales, normal symmetric air entry            Heart: regular rate and rhythm, S1, S2 normal, no murmur, click, rub or gallop, normal apical impulse, and regular rate and rhythm    Abdomen: soft, " non-tender, without masses or organomegaly, normal bowel sounds, without guarding, without rebound, and well-healed surgical incision no evidence of ventral incisional hernia; moderate amount abdominal obesity; no ascites    Pelvic:            Vulva: Normal vulva: no lesions, masses, epithelial changes, or exudate           Vagina: atrophic, no urethrocele; no urinary stress incontinence; no obvious vault prolapse or rectocele; the trans obturator tape urethral sling procedure tape is in excellent position and there is no erosion; no evidence of a vaginal infection; bladder base is nontender           Uterus: Uterus / cervix surgically absent           Adnexae: no mass, fullness, tenderness    Rectal: negative, stool guaiac negative    Extremity: normal, extremities warm, no clubbing, no cyanosis, no edema, non-tender; no CVA tenderness bilaterally and good back alignment    Skin: normal exam        Assessment:   Problem List Items Addressed This Visit    None  Visit Diagnoses       OAB (overactive bladder)    -  Primary    Relevant Orders    CBC Auto Differential    Urine culture    Urinalysis    Vaginitis, atrophic        Relevant Orders    CBC Auto Differential    Urine culture    Urinalysis    Cystitis        Relevant Orders    CBC Auto Differential    Urine culture    Urinalysis    Clavicle enlargement        Minor and proximal on the right with no pain             Plan:  Urinalysis and urine culture; CBC; initiate tolterodine 4 mg daily; recommend InterStim testing; recommend topical vaginal estrogen; monitor her right clavicle and the patient was reassured there is no pathology.  Her thyroid appears normal on the right side.  She has no cervical lymphadenopathy.            Follow-up in several weeks

## 2022-12-20 ENCOUNTER — OFFICE VISIT (OUTPATIENT)
Dept: OBSTETRICS AND GYNECOLOGY | Facility: CLINIC | Age: 72
End: 2022-12-20
Payer: MEDICARE

## 2022-12-20 VITALS
HEART RATE: 68 BPM | TEMPERATURE: 98 F | DIASTOLIC BLOOD PRESSURE: 89 MMHG | SYSTOLIC BLOOD PRESSURE: 139 MMHG | WEIGHT: 224 LBS | HEIGHT: 68 IN | BODY MASS INDEX: 33.95 KG/M2 | RESPIRATION RATE: 16 BRPM

## 2022-12-20 DIAGNOSIS — N95.2 VAGINITIS, ATROPHIC: ICD-10-CM

## 2022-12-20 DIAGNOSIS — R35.1 NOCTURIA MORE THAN TWICE PER NIGHT: ICD-10-CM

## 2022-12-20 DIAGNOSIS — R73.9 HYPERGLYCEMIA: ICD-10-CM

## 2022-12-20 DIAGNOSIS — N30.90 CYSTITIS: ICD-10-CM

## 2022-12-20 DIAGNOSIS — N32.81 OAB (OVERACTIVE BLADDER): Primary | ICD-10-CM

## 2022-12-20 DIAGNOSIS — E55.9 VITAMIN D DEFICIENCY: ICD-10-CM

## 2022-12-20 DIAGNOSIS — K59.00 CONSTIPATION, UNSPECIFIED CONSTIPATION TYPE: ICD-10-CM

## 2022-12-20 LAB
25(OH)D3 SERPL-MCNC: 24.4 NG/ML
BASOPHILS # BLD AUTO: 0.06 K/UL (ref 0–0.2)
BASOPHILS NFR BLD AUTO: 0.7 % (ref 0–1)
BILIRUB UR QL STRIP: NEGATIVE
CLARITY UR: CLEAR
COLOR UR: COLORLESS
DIFFERENTIAL METHOD BLD: ABNORMAL
EOSINOPHIL # BLD AUTO: 0.4 K/UL (ref 0–0.5)
EOSINOPHIL NFR BLD AUTO: 4.9 % (ref 1–4)
ERYTHROCYTE [DISTWIDTH] IN BLOOD BY AUTOMATED COUNT: 14.2 % (ref 11.5–14.5)
EST. AVERAGE GLUCOSE BLD GHB EST-MCNC: 117 MG/DL
GLUCOSE SERPL-MCNC: 85 MG/DL (ref 74–106)
GLUCOSE UR STRIP-MCNC: NORMAL MG/DL
HBA1C MFR BLD HPLC: 6.1 % (ref 4.5–6.6)
HCT VFR BLD AUTO: 44.5 % (ref 38–47)
HGB BLD-MCNC: 14.2 G/DL (ref 12–16)
IMM GRANULOCYTES # BLD AUTO: 0.03 K/UL (ref 0–0.04)
IMM GRANULOCYTES NFR BLD: 0.4 % (ref 0–0.4)
KETONES UR STRIP-SCNC: NEGATIVE MG/DL
LEUKOCYTE ESTERASE UR QL STRIP: NEGATIVE
LYMPHOCYTES # BLD AUTO: 2.27 K/UL (ref 1–4.8)
LYMPHOCYTES NFR BLD AUTO: 28.1 % (ref 27–41)
MCH RBC QN AUTO: 29.3 PG (ref 27–31)
MCHC RBC AUTO-ENTMCNC: 31.9 G/DL (ref 32–36)
MCV RBC AUTO: 91.9 FL (ref 80–96)
MONOCYTES # BLD AUTO: 0.83 K/UL (ref 0–0.8)
MONOCYTES NFR BLD AUTO: 10.3 % (ref 2–6)
MPC BLD CALC-MCNC: 10.8 FL (ref 9.4–12.4)
NEUTROPHILS # BLD AUTO: 4.5 K/UL (ref 1.8–7.7)
NEUTROPHILS NFR BLD AUTO: 55.6 % (ref 53–65)
NITRITE UR QL STRIP: NEGATIVE
NRBC # BLD AUTO: 0 X10E3/UL
NRBC, AUTO (.00): 0 %
PH UR STRIP: 5.5 PH UNITS
PLATELET # BLD AUTO: 303 K/UL (ref 150–400)
PROT UR QL STRIP: NEGATIVE
RBC # BLD AUTO: 4.84 M/UL (ref 4.2–5.4)
RBC # UR STRIP: NEGATIVE /UL
SP GR UR STRIP: 1.01
UROBILINOGEN UR STRIP-ACNC: NORMAL MG/DL
WBC # BLD AUTO: 8.09 K/UL (ref 4.5–11)

## 2022-12-20 PROCEDURE — 82947 GLUCOSE, RANDOM: ICD-10-PCS | Mod: ,,, | Performed by: CLINICAL MEDICAL LABORATORY

## 2022-12-20 PROCEDURE — 87077 CULTURE, URINE: ICD-10-PCS | Mod: ,,, | Performed by: CLINICAL MEDICAL LABORATORY

## 2022-12-20 PROCEDURE — 81003 URINALYSIS: ICD-10-PCS | Mod: QW,,, | Performed by: CLINICAL MEDICAL LABORATORY

## 2022-12-20 PROCEDURE — 83036 HEMOGLOBIN A1C: ICD-10-PCS | Mod: ,,, | Performed by: CLINICAL MEDICAL LABORATORY

## 2022-12-20 PROCEDURE — 82947 ASSAY GLUCOSE BLOOD QUANT: CPT | Mod: ,,, | Performed by: CLINICAL MEDICAL LABORATORY

## 2022-12-20 PROCEDURE — 36415 PR COLLECTION VENOUS BLOOD,VENIPUNCTURE: ICD-10-PCS | Mod: ,,, | Performed by: OBSTETRICS & GYNECOLOGY

## 2022-12-20 PROCEDURE — 83036 HEMOGLOBIN GLYCOSYLATED A1C: CPT | Mod: ,,, | Performed by: CLINICAL MEDICAL LABORATORY

## 2022-12-20 PROCEDURE — 85025 CBC WITH DIFFERENTIAL: ICD-10-PCS | Mod: ,,, | Performed by: CLINICAL MEDICAL LABORATORY

## 2022-12-20 PROCEDURE — 82306 VITAMIN D 25 HYDROXY: CPT | Mod: ,,, | Performed by: CLINICAL MEDICAL LABORATORY

## 2022-12-20 PROCEDURE — 82306 VITAMIN D: ICD-10-PCS | Mod: ,,, | Performed by: CLINICAL MEDICAL LABORATORY

## 2022-12-20 PROCEDURE — 87077 CULTURE AEROBIC IDENTIFY: CPT | Mod: ,,, | Performed by: CLINICAL MEDICAL LABORATORY

## 2022-12-20 PROCEDURE — 87186 SC STD MICRODIL/AGAR DIL: CPT | Mod: ,,, | Performed by: CLINICAL MEDICAL LABORATORY

## 2022-12-20 PROCEDURE — 81003 URINALYSIS AUTO W/O SCOPE: CPT | Mod: QW,,, | Performed by: CLINICAL MEDICAL LABORATORY

## 2022-12-20 PROCEDURE — 99214 OFFICE O/P EST MOD 30 MIN: CPT | Mod: ,,, | Performed by: OBSTETRICS & GYNECOLOGY

## 2022-12-20 PROCEDURE — 36415 COLL VENOUS BLD VENIPUNCTURE: CPT | Mod: ,,, | Performed by: OBSTETRICS & GYNECOLOGY

## 2022-12-20 PROCEDURE — 99214 PR OFFICE/OUTPT VISIT, EST, LEVL IV, 30-39 MIN: ICD-10-PCS | Mod: ,,, | Performed by: OBSTETRICS & GYNECOLOGY

## 2022-12-20 PROCEDURE — 87086 CULTURE, URINE: ICD-10-PCS | Mod: ,,, | Performed by: CLINICAL MEDICAL LABORATORY

## 2022-12-20 PROCEDURE — 85025 COMPLETE CBC W/AUTO DIFF WBC: CPT | Mod: ,,, | Performed by: CLINICAL MEDICAL LABORATORY

## 2022-12-20 PROCEDURE — 87186 CULTURE, URINE: ICD-10-PCS | Mod: ,,, | Performed by: CLINICAL MEDICAL LABORATORY

## 2022-12-20 PROCEDURE — 87086 URINE CULTURE/COLONY COUNT: CPT | Mod: ,,, | Performed by: CLINICAL MEDICAL LABORATORY

## 2022-12-20 RX ORDER — OXYBUTYNIN CHLORIDE 5 MG/1
5 TABLET, EXTENDED RELEASE ORAL DAILY
Qty: 30 TABLET | Refills: 11 | Status: SHIPPED | OUTPATIENT
Start: 2022-12-20 | End: 2024-01-29

## 2022-12-20 NOTE — PATIENT INSTRUCTIONS
Dr. Davey Ragsdale thanks you for this office visit at [facility].    Diagnosis for this visit:   Problem List Items Addressed This Visit    None  Visit Diagnoses       OAB (overactive bladder)    -  Primary    Relevant Orders    CBC Auto Differential    Urine culture    Urinalysis    Glucose, Random    Hemoglobin A1C    Constipation, unspecified constipation type        Relevant Orders    CBC Auto Differential    Urine culture    Urinalysis    Glucose, Random    Hemoglobin A1C    Nocturia more than twice per night        Relevant Orders    CBC Auto Differential    Urine culture    Urinalysis    Glucose, Random    Hemoglobin A1C    Vaginitis, atrophic        Relevant Orders    CBC Auto Differential    Urine culture    Urinalysis    Glucose, Random    Hemoglobin A1C    Hyperglycemia        Relevant Orders    CBC Auto Differential    Urine culture    Urinalysis    Glucose, Random    Hemoglobin A1C    Vitamin D deficiency                 The Plan:   Plan:  Revisit back in 3 months after her hip surgery on her right hip; in the interim change to oxybutynin due to nausea from tolterodine; recommend monitoring protein in her urine; check a hemoglobin A1c today and a blood sugar level since she was advised she may be borderline diabetic in the past.    Patient is a candidate for InterStim evaluation.  Dr. Davey Ragsdale recommends InterStim evaluation after her next visit.  This is after her hip surgery.  She is to continue on topical vaginal estrogen.    Today's labs: Urinalysis; urine culture; and random blood sugar as well as hemoglobin A1c today.  Today the patient will have a vitamin-D check.  She was advised use Vitamin D3 over-the-counter 4000 units daily.      Please practice best food and exercise habits for your age.    Dr. Davey Ragsdale recommends avoidance of smoking and illicit medications or habits.    Please call  or schedule for any change in your health.     Please keep the next scheduled appointment  or call for any need to change the appointment.     Dr. Davey Ragsdale recommends yearly exams for good health maintenance.      Thank you    Dr. Davey Ragsdale  12/20/2022 10:57 AM

## 2022-12-20 NOTE — PROGRESS NOTES
Marci Aguilera female  for   Chief Complaint   Patient presents with    Follow-up     OAB- Tolterodine is making her sick to her stomach, can't tell much of a difference    Vaginal Atrophy     Vag cream is working          PHI:  72-year-old  female returns for follow-up.  The patient was placed on tolterodine 4 mg daily.  She has had significant improvement of overactive bladder issues daytime and nocturia reduced down to just 1 time per night.  Patient states tolterodine does cause some intermittent nausea.  She has increased constipation.  In the past she is used oxybutynin.  She was probably switch to tolterodine due to loss effectiveness as well as constipation complaints.    She is using addition to tolterodine topical vaginal estrogen for atrophic vaginitis.  She states ever since institution twice week she is had more energy.    She is worried about her vitamin-D level.  Past Medical History:   Diagnosis Date    Acute superficial gastritis without hemorrhage 8/15/2022    Atrophic vaginitis 05/12/2004    Coronary artery disease     Cystitis 02/2004    Deep vein thrombosis     DVT (deep venous thrombosis)     Esophageal dysphagia 12/27/2021    Esophagitis 12/27/2021    HH (hiatus hernia) 12/27/2021    Pulmonary embolus     after Septic ~ 2019    Thyroid disease     Vitamin D deficiency       Past Surgical History:   Procedure Laterality Date    CATARACT EXTRACTION      CHOLECYSTECTOMY      CORONARY STENT PLACEMENT      per  2020    FLUOROSCOPIC URODYNAMIC STUDY  10/28/2019    interpreted on 10/29/2019 by Dr. Rudolph Bocanegra;  bladder pain during filling at 320cc,  urodynamic stress incontinence at 302cc without ISD,  no evidence of OAB symtomatology    HIP REPLACEMENT ARTHROPLASTY      JOINT REPLACEMENT      LEFT HEART CATHETERIZATION N/A 6/7/2021    Procedure: Left heart cath with possible intervention;  Surgeon: Chris Dave MD;  Location: [facility] CATH LAB;  Service: Cardiology;   "Laterality: N/A;    rotator cuff      TOTAL ABDOMINAL HYSTERECTOMY      Transobtruator Tape Urethral Sling procedure and limited Cystoscopy  01/29/2020    at [facility];  no complications      Review of patient's allergies indicates:   Allergen Reactions    Statins-hmg-coa reductase inhibitors Other (See Comments)     Refuses to take due to causing muscle aches and joint pain    Codeine     Diphtheria toxin preparations     Dristan allergy Other (See Comments)     hallucinations    Tetanus vaccines and toxoid Other (See Comments)     Arm pain and swelling    Penicillins Rash        ROS:Pertinent items are noted in HPI.    Physical exam:    /89   Pulse 68   Temp 97.7 °F (36.5 °C)   Resp 16   Ht 5' 8" (1.727 m)   Wt 101.6 kg (224 lb)   BMI 34.06 kg/m²      General Appearance: alert, no distress, smiling    Chest:           Lungs: clear to auscultation bilaterally and normal percussion bilaterally           Heart: regular rate and rhythm, S1, S2 normal, no murmur, click, rub or gallop    Abdomen:not performed    Pelvic: not performed     Extremity: normal    Skin: normal exam        Assessment:   Problem List Items Addressed This Visit    None  Visit Diagnoses       OAB (overactive bladder)    -  Primary    Relevant Orders    CBC Auto Differential (Completed)    Urine culture (Completed)    Urinalysis (Completed)    Glucose, Random (Completed)    Hemoglobin A1C (Completed)    Constipation, unspecified constipation type        Relevant Orders    CBC Auto Differential (Completed)    Urine culture (Completed)    Urinalysis (Completed)    Glucose, Random (Completed)    Hemoglobin A1C (Completed)    Nocturia more than twice per night        Relevant Orders    CBC Auto Differential (Completed)    Urine culture (Completed)    Urinalysis (Completed)    Glucose, Random (Completed)    Hemoglobin A1C (Completed)    Vaginitis, atrophic        Relevant Orders    CBC Auto Differential " (Completed)    Urine culture (Completed)    Urinalysis (Completed)    Glucose, Random (Completed)    Hemoglobin A1C (Completed)    Hyperglycemia        Relevant Orders    CBC Auto Differential (Completed)    Urine culture (Completed)    Urinalysis (Completed)    Glucose, Random (Completed)    Hemoglobin A1C (Completed)    Vitamin D deficiency        Relevant Orders    Vitamin D (Completed)    Cystitis        Greater than 100,000 colony count of E coli             Plan:  Revisit back in 3 months after her hip surgery on her right hip; in the interim change to oxybutynin due to nausea from tolterodine; recommend monitoring protein in her urine; check a hemoglobin A1c today and a blood sugar level since she was advised she may be borderline diabetic in the past.    Patient is a candidate for InterStim evaluation.  Dr. Davey Ragsdale recommends InterStim evaluation after her next visit.  This is after her hip surgery.  She is to continue on topical vaginal estrogen.    Today's labs: Urinalysis; urine culture; and random blood sugar as well as hemoglobin A1c today.  Today the patient will have a vitamin-D check.  She was advised use Vitamin D3 over-the-counter 4000 units daily.    Addendum on 12/24/2022:  Patient has greater than 100,000 colonies of Escherichia coli; Patient be placed on Bactrim DS

## 2022-12-22 LAB — UA COMPLETE W REFLEX CULTURE PNL UR: ABNORMAL

## 2022-12-24 RX ORDER — SULFAMETHOXAZOLE AND TRIMETHOPRIM 800; 160 MG/1; MG/1
1 TABLET ORAL 2 TIMES DAILY
Qty: 28 TABLET | Refills: 0 | Status: SHIPPED | OUTPATIENT
Start: 2022-12-24 | End: 2023-01-07

## 2023-01-10 ENCOUNTER — TELEPHONE (OUTPATIENT)
Dept: OBSTETRICS AND GYNECOLOGY | Facility: CLINIC | Age: 73
End: 2023-01-10
Payer: MEDICARE

## 2023-01-11 NOTE — TELEPHONE ENCOUNTER
No answer unable to leave message will try again 01/11/2023    ----- Message from Davey Ragsdale MD sent at 12/24/2022  7:26 PM CST -----  Call patient; she has a greater than 100,000 colony count of E coli.  A prescription for Bactrim DS was sent to her pharmacy.

## 2023-01-13 ENCOUNTER — HOSPITAL ENCOUNTER (INPATIENT)
Facility: HOSPITAL | Age: 73
LOS: 9 days | Discharge: HOME | DRG: 559 | End: 2023-01-22
Attending: FAMILY MEDICINE | Admitting: FAMILY MEDICINE
Payer: MEDICARE

## 2023-01-13 DIAGNOSIS — Z96.641 STATUS POST RIGHT HIP REPLACEMENT: ICD-10-CM

## 2023-01-13 DIAGNOSIS — Z96.641 S/P TOTAL RIGHT HIP ARTHROPLASTY: Primary | ICD-10-CM

## 2023-01-13 PROBLEM — J45.909 ASTHMA: Status: ACTIVE | Noted: 2023-01-13

## 2023-01-13 PROBLEM — R79.89 POSITIVE D-DIMER: Status: RESOLVED | Noted: 2021-06-07 | Resolved: 2023-01-13

## 2023-01-13 PROBLEM — I25.110 ATHEROSCLEROSIS OF NATIVE CORONARY ARTERY OF NATIVE HEART WITH UNSTABLE ANGINA PECTORIS: Status: RESOLVED | Noted: 2021-05-26 | Resolved: 2023-01-13

## 2023-01-13 PROBLEM — R07.9 CHEST PAIN: Status: RESOLVED | Noted: 2021-06-07 | Resolved: 2023-01-13

## 2023-01-13 PROBLEM — Z86.718 HISTORY OF DVT (DEEP VEIN THROMBOSIS): Status: ACTIVE | Noted: 2023-01-13

## 2023-01-13 PROBLEM — I25.10 CAD (CORONARY ARTERY DISEASE): Status: ACTIVE | Noted: 2021-05-26

## 2023-01-13 PROBLEM — R06.02 SHORTNESS OF BREATH: Status: RESOLVED | Noted: 2021-06-28 | Resolved: 2023-01-13

## 2023-01-13 PROBLEM — K29.00 ACUTE SUPERFICIAL GASTRITIS WITHOUT HEMORRHAGE: Status: RESOLVED | Noted: 2022-08-15 | Resolved: 2023-01-13

## 2023-01-13 LAB
ANION GAP SERPL CALCULATED.3IONS-SCNC: 5 MMOL/L (ref 7–16)
BASOPHILS # BLD AUTO: 0.03 K/UL (ref 0–0.2)
BASOPHILS NFR BLD AUTO: 0.3 % (ref 0–1)
BILIRUB UR QL STRIP: NEGATIVE
BUN SERPL-MCNC: 10 MG/DL (ref 7–18)
BUN/CREAT SERPL: 10 (ref 6–20)
CALCIUM SERPL-MCNC: 7.9 MG/DL (ref 8.5–10.1)
CHLORIDE SERPL-SCNC: 99 MMOL/L (ref 98–107)
CLARITY UR: CLEAR
CO2 SERPL-SCNC: 29 MMOL/L (ref 21–32)
COLOR UR: YELLOW
CREAT SERPL-MCNC: 0.97 MG/DL (ref 0.55–1.02)
DIFFERENTIAL METHOD BLD: ABNORMAL
EGFR (NO RACE VARIABLE) (RUSH/TITUS): 62 ML/MIN/1.73M²
EOSINOPHIL # BLD AUTO: 0.44 K/UL (ref 0–0.5)
EOSINOPHIL NFR BLD AUTO: 5.1 % (ref 1–4)
ERYTHROCYTE [DISTWIDTH] IN BLOOD BY AUTOMATED COUNT: 14 % (ref 11.5–14.5)
GLUCOSE SERPL-MCNC: 127 MG/DL (ref 74–106)
GLUCOSE UR STRIP-MCNC: NEGATIVE MG/DL
HCT VFR BLD AUTO: 31.9 % (ref 38–47)
HGB BLD-MCNC: 10.2 G/DL (ref 12–16)
KETONES UR STRIP-SCNC: NEGATIVE MG/DL
LEUKOCYTE ESTERASE UR QL STRIP: NEGATIVE
LYMPHOCYTES # BLD AUTO: 1.4 K/UL (ref 1–4.8)
LYMPHOCYTES NFR BLD AUTO: 16.1 % (ref 27–41)
LYMPHOCYTES NFR BLD MANUAL: 14 % (ref 27–41)
MCH RBC QN AUTO: 29.2 PG (ref 27–31)
MCHC RBC AUTO-ENTMCNC: 32 G/DL (ref 32–36)
MCV RBC AUTO: 91.4 FL (ref 80–96)
MONOCYTES # BLD AUTO: 0.96 K/UL (ref 0–0.8)
MONOCYTES NFR BLD AUTO: 11 % (ref 2–6)
MONOCYTES NFR BLD MANUAL: 14 % (ref 2–6)
MPC BLD CALC-MCNC: 10 FL (ref 9.4–12.4)
NEUTROPHILS # BLD AUTO: 5.88 K/UL (ref 1.8–7.7)
NEUTROPHILS NFR BLD AUTO: 67.5 % (ref 53–65)
NEUTS BAND NFR BLD MANUAL: 5 % (ref 1–5)
NEUTS SEG NFR BLD MANUAL: 67 % (ref 50–62)
NITRITE UR QL STRIP: NEGATIVE
NRBC BLD MANUAL-RTO: ABNORMAL %
PH UR STRIP: 6.5 PH UNITS
PLATELET # BLD AUTO: 262 K/UL (ref 150–400)
PLATELET MORPHOLOGY: NORMAL
POTASSIUM SERPL-SCNC: 3.7 MMOL/L (ref 3.5–5.1)
PROT UR QL STRIP: NEGATIVE
RBC # BLD AUTO: 3.49 M/UL (ref 4.2–5.4)
RBC # UR STRIP: NEGATIVE /UL
RBC MORPH BLD: NORMAL
SODIUM SERPL-SCNC: 129 MMOL/L (ref 136–145)
SP GR UR STRIP: 1.01
UROBILINOGEN UR STRIP-ACNC: 0.2 MG/DL
WBC # BLD AUTO: 8.71 K/UL (ref 4.5–11)

## 2023-01-13 PROCEDURE — 97166 OT EVAL MOD COMPLEX 45 MIN: CPT

## 2023-01-13 PROCEDURE — 81003 URINALYSIS AUTO W/O SCOPE: CPT | Performed by: NURSE PRACTITIONER

## 2023-01-13 PROCEDURE — 36415 COLL VENOUS BLD VENIPUNCTURE: CPT | Performed by: NURSE PRACTITIONER

## 2023-01-13 PROCEDURE — 84134 ASSAY OF PREALBUMIN: CPT | Performed by: NURSE PRACTITIONER

## 2023-01-13 PROCEDURE — 11000004 HC SNF PRIVATE

## 2023-01-13 PROCEDURE — 25000242 PHARM REV CODE 250 ALT 637 W/ HCPCS: Performed by: FAMILY MEDICINE

## 2023-01-13 PROCEDURE — 94640 AIRWAY INHALATION TREATMENT: CPT

## 2023-01-13 PROCEDURE — 99305 PR NURSING FACILITY CARE, INIT, MOD SEVERITY: ICD-10-PCS | Mod: AI,,, | Performed by: FAMILY MEDICINE

## 2023-01-13 PROCEDURE — 94761 N-INVAS EAR/PLS OXIMETRY MLT: CPT

## 2023-01-13 PROCEDURE — 80048 BASIC METABOLIC PNL TOTAL CA: CPT | Performed by: NURSE PRACTITIONER

## 2023-01-13 PROCEDURE — 99900035 HC TECH TIME PER 15 MIN (STAT)

## 2023-01-13 PROCEDURE — 85025 COMPLETE CBC W/AUTO DIFF WBC: CPT | Performed by: NURSE PRACTITIONER

## 2023-01-13 PROCEDURE — 25000003 PHARM REV CODE 250: Performed by: NURSE PRACTITIONER

## 2023-01-13 PROCEDURE — 97161 PT EVAL LOW COMPLEX 20 MIN: CPT

## 2023-01-13 PROCEDURE — 25000003 PHARM REV CODE 250: Performed by: FAMILY MEDICINE

## 2023-01-13 PROCEDURE — 99305 1ST NF CARE MODERATE MDM 35: CPT | Mod: AI,,, | Performed by: FAMILY MEDICINE

## 2023-01-13 RX ORDER — GUAIFENESIN/DEXTROMETHORPHAN 100-10MG/5
10 SYRUP ORAL EVERY 4 HOURS PRN
Status: DISCONTINUED | OUTPATIENT
Start: 2023-01-13 | End: 2023-01-22 | Stop reason: HOSPADM

## 2023-01-13 RX ORDER — ACETAMINOPHEN 325 MG/1
650 TABLET ORAL EVERY 6 HOURS PRN
Status: DISCONTINUED | OUTPATIENT
Start: 2023-01-13 | End: 2023-01-22 | Stop reason: HOSPADM

## 2023-01-13 RX ORDER — MAG HYDROX/ALUMINUM HYD/SIMETH 200-200-20
15 SUSPENSION, ORAL (FINAL DOSE FORM) ORAL EVERY 6 HOURS PRN
Status: DISCONTINUED | OUTPATIENT
Start: 2023-01-13 | End: 2023-01-22 | Stop reason: HOSPADM

## 2023-01-13 RX ORDER — ALBUTEROL SULFATE 90 UG/1
2 AEROSOL, METERED RESPIRATORY (INHALATION) EVERY 6 HOURS PRN
Status: DISCONTINUED | OUTPATIENT
Start: 2023-01-13 | End: 2023-01-13 | Stop reason: CLARIF

## 2023-01-13 RX ORDER — ONDANSETRON 4 MG/1
4 TABLET, ORALLY DISINTEGRATING ORAL EVERY 8 HOURS PRN
Status: DISCONTINUED | OUTPATIENT
Start: 2023-01-13 | End: 2023-01-22 | Stop reason: HOSPADM

## 2023-01-13 RX ORDER — POLYETHYLENE GLYCOL 3350 17 G/17G
17 POWDER, FOR SOLUTION ORAL 2 TIMES DAILY PRN
Status: DISCONTINUED | OUTPATIENT
Start: 2023-01-13 | End: 2023-01-22 | Stop reason: HOSPADM

## 2023-01-13 RX ORDER — HYDROCODONE BITARTRATE AND ACETAMINOPHEN 7.5; 325 MG/1; MG/1
1 TABLET ORAL EVERY 6 HOURS PRN
Status: DISCONTINUED | OUTPATIENT
Start: 2023-01-13 | End: 2023-01-13

## 2023-01-13 RX ORDER — AMOXICILLIN 250 MG
1 CAPSULE ORAL 2 TIMES DAILY
Status: DISCONTINUED | OUTPATIENT
Start: 2023-01-13 | End: 2023-01-19

## 2023-01-13 RX ORDER — HYDROCODONE BITARTRATE AND ACETAMINOPHEN 10; 325 MG/1; MG/1
1 TABLET ORAL EVERY 6 HOURS PRN
Status: DISCONTINUED | OUTPATIENT
Start: 2023-01-13 | End: 2023-01-17

## 2023-01-13 RX ORDER — FONDAPARINUX SODIUM 2.5 MG/.5ML
2.5 INJECTION SUBCUTANEOUS DAILY
Status: COMPLETED | OUTPATIENT
Start: 2023-01-14 | End: 2023-01-18

## 2023-01-13 RX ORDER — FLUTICASONE PROPIONATE 50 MCG
2 SPRAY, SUSPENSION (ML) NASAL
Status: DISCONTINUED | OUTPATIENT
Start: 2023-01-13 | End: 2023-01-22 | Stop reason: HOSPADM

## 2023-01-13 RX ORDER — ASPIRIN 81 MG/1
81 TABLET ORAL DAILY
Status: DISCONTINUED | OUTPATIENT
Start: 2023-01-19 | End: 2023-01-22 | Stop reason: HOSPADM

## 2023-01-13 RX ORDER — OXYBUTYNIN CHLORIDE 5 MG/1
5 TABLET, EXTENDED RELEASE ORAL DAILY
Status: DISCONTINUED | OUTPATIENT
Start: 2023-01-14 | End: 2023-01-22 | Stop reason: HOSPADM

## 2023-01-13 RX ORDER — ESTRADIOL 0.1 MG/G
1 CREAM VAGINAL
Status: DISCONTINUED | OUTPATIENT
Start: 2023-01-14 | End: 2023-01-22 | Stop reason: HOSPADM

## 2023-01-13 RX ORDER — ASPIRIN 81 MG/1
81 TABLET ORAL DAILY
Status: DISCONTINUED | OUTPATIENT
Start: 2023-01-14 | End: 2023-01-13 | Stop reason: CLARIF

## 2023-01-13 RX ORDER — TALC
6 POWDER (GRAM) TOPICAL NIGHTLY PRN
Status: DISCONTINUED | OUTPATIENT
Start: 2023-01-13 | End: 2023-01-22 | Stop reason: HOSPADM

## 2023-01-13 RX ORDER — PANTOPRAZOLE SODIUM 40 MG/1
40 TABLET, DELAYED RELEASE ORAL DAILY
Status: DISCONTINUED | OUTPATIENT
Start: 2023-01-14 | End: 2023-01-22 | Stop reason: HOSPADM

## 2023-01-13 RX ORDER — ACETAMINOPHEN 325 MG/1
325 TABLET ORAL EVERY 6 HOURS PRN
Status: DISCONTINUED | OUTPATIENT
Start: 2023-01-13 | End: 2023-01-22 | Stop reason: HOSPADM

## 2023-01-13 RX ORDER — MUPIROCIN 20 MG/G
OINTMENT TOPICAL 2 TIMES DAILY
Status: COMPLETED | OUTPATIENT
Start: 2023-01-13 | End: 2023-01-18

## 2023-01-13 RX ORDER — ACETAMINOPHEN 325 MG/1
650 TABLET ORAL EVERY 6 HOURS PRN
Status: DISCONTINUED | OUTPATIENT
Start: 2023-01-13 | End: 2023-01-13

## 2023-01-13 RX ORDER — ALBUTEROL SULFATE 0.83 MG/ML
2.5 SOLUTION RESPIRATORY (INHALATION) EVERY 4 HOURS PRN
Status: DISCONTINUED | OUTPATIENT
Start: 2023-01-13 | End: 2023-01-22 | Stop reason: HOSPADM

## 2023-01-13 RX ADMIN — HYDROCODONE BITARTRATE AND ACETAMINOPHEN 1 TABLET: 10; 325 TABLET ORAL at 10:01

## 2023-01-13 RX ADMIN — MUPIROCIN: 20 OINTMENT TOPICAL at 09:01

## 2023-01-13 RX ADMIN — ALBUTEROL SULFATE 2.5 MG: 2.5 SOLUTION RESPIRATORY (INHALATION) at 08:01

## 2023-01-13 RX ADMIN — SENNOSIDES AND DOCUSATE SODIUM 1 TABLET: 8.6; 5 TABLET ORAL at 03:01

## 2023-01-13 RX ADMIN — GUAIFENESIN AND DEXTROMETHORPHAN 10 ML: 100; 10 SYRUP ORAL at 09:01

## 2023-01-13 RX ADMIN — SENNOSIDES AND DOCUSATE SODIUM 1 TABLET: 8.6; 5 TABLET ORAL at 09:01

## 2023-01-13 RX ADMIN — ALBUTEROL SULFATE 2.5 MG: 2.5 SOLUTION RESPIRATORY (INHALATION) at 03:01

## 2023-01-13 RX ADMIN — HYDROCODONE BITARTRATE AND ACETAMINOPHEN 1 TABLET: 7.5; 325 TABLET ORAL at 03:01

## 2023-01-13 RX ADMIN — ONDANSETRON 4 MG: 4 TABLET, ORALLY DISINTEGRATING ORAL at 03:01

## 2023-01-13 NOTE — ASSESSMENT & PLAN NOTE
VTE prophylaxis:  CIRO/SCD's  Early ambulation  Arixtra x 5 days, then switch to enteric coated ASA daily.

## 2023-01-13 NOTE — PT/OT/SLP EVAL
Occupational Therapy   Evaluation    Name: Marci Aguilera  MRN: [id number]  Admitting Diagnosis: S/P total right hip arthroplasty  Recent Surgery: * No surgery found *      Recommendations:     Discharge Recommendations: home with home health, outpatient OT  Discharge Equipment Recommendations:  walker, rolling  Barriers to discharge:       Assessment:     Marci Aguilera is a 72 y.o. female with a medical diagnosis of S/P total right hip arthroplasty.  She presents with decreased RLE coordination, strength and balance. Performance deficits affecting function: impaired endurance, weakness, impaired self care skills, impaired functional mobility, impaired balance, decreased safety awareness, decreased lower extremity function.  Pt was agreeable to OT eval but complains of pain at this time.    Rehab Prognosis: Good; patient would benefit from acute skilled OT services to address these deficits and reach maximum level of function.       Plan:     Patient to be seen 5 x/week to address the above listed problems via self-care/home management, therapeutic activities, therapeutic exercises, neuromuscular re-education  Plan of Care Expires: 02/10/23  Plan of Care Reviewed with: patient    Subjective     Chief Complaint: pain, decreased ROM, and strength  Patient/Family Comments/goals: Pt wants to return PLOF    Occupational Profile:  Living Environment: Pt lives at home with  with 6 steps to enter  Previous level of function: I  Equipment Used at Home: walker, rolling  Assistance upon Discharge: Mod I with use of RW    Pain/Comfort:  Pain Rating 1: 6/10  Location - Side 1: Right  Location - Orientation 1: lower    Patients cultural, spiritual, Orthodoxy conflicts given the current situation: no    Objective:     Communicated with: Pt prior to session.  Patient found supine with   upon OT entry to room.    General Precautions: Standard, fall  Orthopedic Precautions: RUE weight bearing as tolerated  Braces:     Respiratory Status: Room air    Occupational Performance:    Bed Mobility:    Patient completed Supine to Sit with minimum assistance with RLE    Functional Mobility/Transfers:  Patient completed Sit <> Stand Transfer with contact guard assistance  with  rolling walker   Patient completed Toilet Transfer Step Transfer technique with contact guard assistance with  rolling walker  Functional Mobility: Pt completed functional ambulation to the restroom using the RW for balance and safety/    Activities of Daily Living:  Lower Body Dressing: maximal assistance due to decreased  Toileting: minimum assistance for balance and safety    Cognitive/Visual Perceptual:  Cognitive/Psychosocial Skills:     -       Oriented to: Person, Place, Time, and Situation   -       Safety awareness/insight to disability: intact     Physical Exam:  Balance:    -       FAIR  Upper Extremity Range of Motion:     -       Right Upper Extremity: WFL  -       Left Upper Extremity: WFL  Upper Extremity Strength:    -       Right Upper Extremity: WFL  -       Left Upper Extremity: WFL    AMPAC 6 Click ADL:  AMPAC Total Score: 17    Treatment & Education:  OT eval    Patient left supine with all lines intact and call button in reach    GOALS:   Multidisciplinary Problems       Occupational Therapy Goals          Problem: Occupational Therapy    Goal Priority Disciplines Outcome Interventions   Occupational Therapy Goal     OT, PT/OT Ongoing, Progressing    Description: Goals to be met by: 02/13/23     Patient will increase functional independence with ADLs by performing:    UE Dressing with [place].  LE Dressing with Modified [place] using a/e as needed  Grooming while standing with Modified [place].  Toileting from toilet with Modified [place] for hygiene and clothing management.   Bathing from  edge of bed with Supervision.  Sitting at edge of bed x5-10 mins minutes with Modified [place].  Stand pivot transfers with  Modified [place].  Step transfer with Modified [place]  Toilet transfer to toilet with Modified [place].                         History:     Past Medical History:   Diagnosis Date    Acute superficial gastritis without hemorrhage 8/15/2022    Atrophic vaginitis 05/12/2004    Coronary artery disease     Cystitis 02/2004    Deep vein thrombosis     DVT (deep venous thrombosis)     Esophageal dysphagia 12/27/2021    Esophagitis 12/27/2021    HH (hiatus hernia) 12/27/2021    Pulmonary embolus     after Septic ~ 2019    Thyroid disease     Vitamin D deficiency          Past Surgical History:   Procedure Laterality Date    CATARACT EXTRACTION      CHOLECYSTECTOMY      CORONARY STENT PLACEMENT      per  2020    FLUOROSCOPIC URODYNAMIC STUDY  10/28/2019    interpreted on 10/29/2019 by Dr. Rudolph Bocanegra;  bladder pain during filling at 320cc,  urodynamic stress incontinence at 302cc without ISD,  no evidence of OAB symtomatology    HIP REPLACEMENT ARTHROPLASTY      JOINT REPLACEMENT      LEFT HEART CATHETERIZATION N/A 6/7/2021    Procedure: Left heart cath with possible intervention;  Surgeon: Chris Dave MD;  Location: [facility] CATH LAB;  Service: Cardiology;  Laterality: N/A;    rotator cuff      TOTAL ABDOMINAL HYSTERECTOMY      Transobtruator Tape Urethral Sling procedure and limited Cystoscopy  01/29/2020    at [facility];  no complications       Time Tracking:     OT Date of Treatment:    OT Start Time:  3:40  OT Stop Time:  4:00  OT Total Time (min):      Billable Minutes:Evaluation 20 1/13/2023

## 2023-01-13 NOTE — PLAN OF CARE
Problem: Occupational Therapy  Goal: Occupational Therapy Goal  Description: Goals to be met by: 02/13/23     Patient will increase functional independence with ADLs by performing:    UE Dressing with [place].  LE Dressing with Modified [place] using a/e as needed  Grooming while standing with Modified [place].  Toileting from toilet with Modified [place] for hygiene and clothing management.   Bathing from  edge of bed with Supervision.  Sitting at edge of bed x5-10 mins minutes with Modified [place].  Stand pivot transfers with Modified [place].  Step transfer with Modified [place]  Toilet transfer to toilet with Modified [place].    Outcome: Ongoing, Progressing

## 2023-01-13 NOTE — ASSESSMENT & PLAN NOTE
Performed by Dr. Germán Barron on 1/10/23  PT/OT  Daily dressing changes  DC staples and place steri strips on 1/24/23  F/U with Dr. Germán Barron in 4 weeks

## 2023-01-13 NOTE — PLAN OF CARE
Problem: Physical Therapy  Goal: Physical Therapy Goal  Description: Goals to be met by: 2 weeks     Patient will increase functional independence with mobility by performin. Supine to sit with Stand-by Assistance  2. Sit to supine with Stand-by Assistance  3. Rolling to Left and Right with Modified [place].  4. Sit to stand transfer with Stand-by Assistance  5. Bed to chair transfer with Stand-by Assistance using Rolling Walker  6. Gait  x 150 feet with Stand-by Assistance using Rolling Walker.     Goals to be met by: 4 weeks     Patient will increase functional independence with mobility by performin. Supine to sit with Modified [place]  2. Sit to supine with Modified [place]  3. Sit to stand transfer with Modified [place]  4. Bed to chair transfer with Modified [place] using Rolling Walker  5. Gait  x 300 feet with Modified [place] using Rolling Walker.     Outcome: Ongoing, Progressing

## 2023-01-13 NOTE — NURSING
Pt arrive via EMS stretcher at this time. No acute distress noted.    1145 ELIJAH Mcgee RN at bedside to complete skin assessment. Dressing to R hip clean, dry, intact. 2 puncture sites noted to R thigh below hip dressing where drain was removed; no drainage noted. Small, red moisture abrasion noted under R abdominal fold. No other breakdown noted. CIRO hose in place.  Pt has personal clothes, shoes, suitcase, cell phone, walker, rings, watch. Pt states she does not wear dentures or hearing aids; states she wears reading glasses. Pt states she does not have home meds with her. Pt has brief on that is dry at this time; pt states she knows when she has to go to the bathroom.     1214 JORGE L Hernandez NP notified of pt arrival

## 2023-01-13 NOTE — NURSING
JORGE L Hernandez NP notified pt's Na+ 129 on admission labs. Na+ was 137 on 1/11. JORGE L Hernandez NP states she will repeat labs in AM & to tell pt to drink more fluids.

## 2023-01-13 NOTE — PT/OT/SLP EVAL
Physical Therapy Evaluation    Patient Name:  Marci Aguilera   MRN:  [id number]    Recommendations:     Discharge Recommendations: home with home health, outpatient PT   Discharge Equipment Recommendations: walker, rolling   Barriers to discharge: None    Assessment:     Marci Aguilera is a 72 y.o. female admitted with a medical diagnosis of S/P total right hip arthroplasty.  She presents with the following impairments/functional limitations: weakness, impaired self care skills, impaired functional mobility, gait instability, decreased lower extremity function .    Rehab Prognosis: Good; patient would benefit from acute skilled PT services to address these deficits and reach maximum level of function.    Recent Surgery: * right THR *      Plan:     During this hospitalization, patient to be seen BID (5 days per week) to address the identified rehab impairments via gait training, therapeutic activities, therapeutic exercises, neuromuscular re-education and progress toward the following goals:    Plan of Care Expires:  02/10/23    Subjective     Chief Complaint: right hip pain  Patient/Family Comments/goals: to improve mobility, reduce pain so she can return home.  Pain/Comfort:  Pain Rating 1: 9/10  Location - Side 1: Right  Location - Orientation 1: lower  Location 1: hip  Pain Addressed 1: Pre-medicate for activity  Pain Rating Post-Intervention 1: 9/10    Patients cultural, spiritual, Mandaen conflicts given the current situation: no    Living Environment:  Lives with spouse.  Prior to admission, patients level of function was independent.  Equipment used at home: walker, rolling.  DME owned (not currently used): rolling walker.  Upon discharge, patient will have assistance from spouse.    Objective:     Communicated with patient prior to session.  Patient found supine    upon PT entry to room.    General Precautions: Standard, fall  Orthopedic Precautions:RLE weight bearing as tolerated, RLE posterior  precautions   Braces: N/A  Respiratory Status: Room air    Exams:  Cognitive Exam:  Patient is oriented to Person, Place, Time, and Situation  Gross Motor Coordination:  WFL  RUE Strength: WFL  LUE Strength: WFL  RLE ROM: WFL except hip  RLE Strength: 2+ to 3+  LLE ROM: WFL  LLE Strength: WFL    Functional Mobility:  Bed Mobility:     Supine to Sit: minimum assistance  Sit to Supine: minimum assistance  Transfers:     Sit to Stand:  minimum assistance with rolling walker  Gait: 40 feet with RW CGA; antalgic gait but able to take reciprocal steps WBAT  Balance: Fair      AM-PAC 6 CLICK MOBILITY  Total Score:16     Patient left supine with call button in reach.    Case conference with Annmarie Paula, PTA and POC reviewed.     GOALS:   Multidisciplinary Problems       Physical Therapy Goals          Problem: Physical Therapy    Goal Priority Disciplines Outcome Goal Variances Interventions   Physical Therapy Goal     PT, PT/OT Ongoing, Progressing     Description: Goals to be met by: 2 weeks     Patient will increase functional independence with mobility by performin. Supine to sit with Stand-by Assistance  2. Sit to supine with Stand-by Assistance  3. Rolling to Left and Right with Modified Garvin.  4. Sit to stand transfer with Stand-by Assistance  5. Bed to chair transfer with Stand-by Assistance using Rolling Walker  6. Gait  x 150 feet with Stand-by Assistance using Rolling Walker.     Goals to be met by: 4 weeks     Patient will increase functional independence with mobility by performin. Supine to sit with Modified Garvin  2. Sit to supine with Modified Garvin  3. Sit to stand transfer with Modified Garvin  4. Bed to chair transfer with Modified Garvin using Rolling Walker  5. Gait  x 300 feet with Modified Garvin using Rolling Walker.                          History:     Past Medical History:   Diagnosis Date    Acute superficial gastritis without hemorrhage  8/15/2022    Atrophic vaginitis 05/12/2004    Coronary artery disease     Cystitis 02/2004    Deep vein thrombosis     DVT (deep venous thrombosis)     Esophageal dysphagia 12/27/2021    Esophagitis 12/27/2021    HH (hiatus hernia) 12/27/2021    Pulmonary embolus     after Septic ~ 2019    Thyroid disease     Vitamin D deficiency        Past Surgical History:   Procedure Laterality Date    CATARACT EXTRACTION      CHOLECYSTECTOMY      CORONARY STENT PLACEMENT      per  2020    FLUOROSCOPIC URODYNAMIC STUDY  10/28/2019    interpreted on 10/29/2019 by Dr. Rudolph Bocanegra;  bladder pain during filling at 320cc,  urodynamic stress incontinence at 302cc without ISD,  no evidence of OAB symtomatology    HIP REPLACEMENT ARTHROPLASTY      JOINT REPLACEMENT      LEFT HEART CATHETERIZATION N/A 6/7/2021    Procedure: Left heart cath with possible intervention;  Surgeon: Chris Dave MD;  Location: [facility] CATH LAB;  Service: Cardiology;  Laterality: N/A;    rotator cuff      TOTAL ABDOMINAL HYSTERECTOMY      Transobtruator Tape Urethral Sling procedure and limited Cystoscopy  01/29/2020    at [facility];  no complications       Time Tracking:     PT Received On: 01/13/23  PT Start Time: 1600     PT Stop Time: 1620  PT Total Time (min): 20 min     Billable Minutes: Evaluation 20 01/13/2023

## 2023-01-13 NOTE — NURSING
1533 Pt complains of cough that she's had for a while & SOB after going to the bathroom. Sats 97%. Pt requests chest x-ray because she thinks she's getting pneumonia & prn med for cough that is not tessalon perls.    1725 Pt c/o pain 6 on 0-10 scale after Norco 7.5 at 1641. Pt &  states she needs something stronger & was receiving 2 tablets of [place] at [facility]. ROYER Hernandez notified; states she will order chest x-ray, cough suppressant, & will increase Norco to 10 mg.

## 2023-01-13 NOTE — PLAN OF CARE
[facility]  Initial Discharge Assessment       Primary Care Provider: ANKUSH Hunter    Admission Diagnosis: S/P total right hip arthroplasty [Z96.641]  Generalized weakness [R53.1]  CAD (coronary artery disease) [I25.10]  History of DVT (deep vein thrombosis) [Z86.718]  H/O thyroidectomy [E89.0]  Asthma [J45.909]  Obesity [E66.9]  Dyslipidemia [E78.5]  Status post right hip replacement [Z96.641]    Admission Date: 1/13/2023  Expected Discharge Date:     Discharge Barriers Identified: None    Payor: MEDICARE / Plan: MEDICARE PART A & B / Product Type: Government /     Extended Emergency Contact Information  Primary Emergency Contact: Yony Aguilera  Mobile Phone: [phone]  Relation: Spouse  Preferred language: English   needed? No  Secondary Emergency Contact: Tiana Abel  Mobile Phone: [phone]  Relation: Daughter  Preferred language: English   needed? No    Discharge Plan A: Home with family, Home Health  Discharge Plan B: Home with family, Rehab      [facility] - [place] - [address]  [address]  [facility] [address]  Phone: [phone] Fax: [phone]    [facility] [address] [place] [address] [address] [facility]  [address] [facility] [address]  Phone: [phone] Fax: [phone]    [id number] - [place], [place] - [id number] [facility] [address]  Phone: [phone] Fax: [phone]    [id number] - [place], [place] - [address]  [address]  [place] [address]  Phone: [phone] Fax: [phone]      Initial Assessment (most recent)       Adult Discharge Assessment - 01/13/23 1550          Discharge Assessment    Assessment Type Discharge Planning Assessment     Confirmed/corrected address, phone number and insurance Yes     Confirmed Demographics Correct on Facesheet     Source of Information patient;family;health record     If unable to  respond/provide information was family/caregiver contacted? Yes     Contact Name/Number Yony Aguilera, spouse [phone]     Communicated ABDIRAHMAN with patient/caregiver Date not available/Unable to determine     Reason For Admission Rt. total hip arthroplasty     People in Home spouse     Facility Arrived From: [facility]     Do you expect to return to your current living situation? Yes     Do you have help at home or someone to help you manage your care at home? Yes     Who are your caregiver(s) and their phone number(s)? Yony, spouse     Prior to hospitilization cognitive status: Alert/Oriented     Current cognitive status: Alert/Oriented     Walking or Climbing Stairs ambulation difficulty, requires equipment;stair climbing difficulty, requires equipment;transferring difficulty, requires equipment     Mobility Management RW     Dressing/Bathing bathing difficulty, assistance 1 person;dressing difficulty, assistance 1 person     Home Accessibility wheelchair accessible     Home Layout Able to live on 1st floor     Equipment Currently Used at Home walker, rolling     Readmission within 30 days? No     Patient currently being followed by outpatient case management? No     Do you currently have service(s) that help you manage your care at home? No     Do you take prescription medications? Yes     Do you have prescription coverage? Yes     Coverage Medicare     Do you have any problems affording any of your prescribed medications? No     Is the patient taking medications as prescribed? yes     Who is going to help you get home at discharge? Yony, spouse     How do you get to doctors appointments? family or friend will provide     Are you on dialysis? No     Do you take coumadin? No     Discharge Plan A Home with family;Home Health     Discharge Plan B Home with family;Rehab     DME Needed Upon Discharge  bedside commode;walker, rolling     Discharge Plan discussed with: Patient     Discharge Barriers  Identified None        Physical Activity    On average, how many days per week do you engage in moderate to strenuous exercise (like a brisk walk)? 3 days     On average, how many minutes do you engage in exercise at this level? 20 min        Financial Resource Strain    How hard is it for you to pay for the very basics like food, housing, medical care, and heating? Not very hard        Housing Stability    In the last 12 months, was there a time when you were not able to pay the mortgage or rent on time? No     In the last 12 months, how many places have you lived? 1     In the last 12 months, was there a time when you did not have a steady place to sleep or slept in a shelter (including now)? No        Transportation Needs    In the past 12 months, has lack of transportation kept you from medical appointments or from getting medications? No     In the past 12 months, has lack of transportation kept you from meetings, work, or from getting things needed for daily living? No        Food Insecurity    Within the past 12 months, you worried that your food would run out before you got the money to buy more. Never true     Within the past 12 months, the food you bought just didn't last and you didn't have money to get more. Never true        Stress    Do you feel stress - tense, restless, nervous, or anxious, or unable to sleep at night because your mind is troubled all the time - these days? Only a little        Social Connections    In a typical week, how many times do you talk on the phone with family, friends, or neighbors? More than three times a week     How often do you get together with friends or relatives? More than three times a week     How often do you attend Baptism or Mosque services? 1 to 4 times per year     Do you belong to any clubs or organizations such as Baptism groups, unions, fraternal or athletic groups, or school groups? No     How often do you attend meetings of the clubs or organizations you  belong to? Never     Are you , , , , never , or living with a partner?         Alcohol Use    Q1: How often do you have a drink containing alcohol? Never     Q2: How many drinks containing alcohol do you have on a typical day when you are drinking? Patient does not drink     Q3: How often do you have six or more drinks on one occasion? Never        OTHER    Name(s) of People in Home Yony Aguilera, spouse                   Pt. Admitted to swing bed services for continued rehabilitation with PT/OT services, pain management, and supportive care following an elective Rt. Total hip Arthroplasty surgery. Pt. Lives with spouse. Pt. Is independent at home and has a RW but has not had to use it often. Pt. Has used HH services with last hip surgery but is not current with HH at this time. Pt. Plans to return with spouse and if needed HH services or Outpatient Rehab here at [facility]. Will cont. To follow pt. And assist as needed throughout stay.

## 2023-01-13 NOTE — NURSING
"Spoke with JORGE L Hernandez NP about pt's meds to be continued at [facility]. Note on 1/10 by Dr. Dunlap states to "continue arixtra x5 more days and then switch to enteric-coated aspirin once daily."   "

## 2023-01-13 NOTE — PLAN OF CARE
Problem: Adult Inpatient Plan of Care  Goal: Plan of Care Review  Outcome: Ongoing, Progressing  Goal: Patient-Specific Goal (Individualized)  Outcome: Ongoing, Progressing  Goal: Absence of Hospital-Acquired Illness or Injury  Outcome: Ongoing, Progressing  Goal: Optimal Comfort and Wellbeing  Outcome: Ongoing, Progressing  Goal: Readiness for Transition of Care  Outcome: Ongoing, Progressing     Problem: Fall Injury Risk  Goal: Absence of Fall and Fall-Related Injury  Outcome: Ongoing, Progressing     Problem: Fatigue  Goal: Improved Activity Tolerance  Outcome: Ongoing, Progressing     Problem: Pain Acute  Goal: Acceptable Pain Control and Functional Ability  Outcome: Ongoing, Progressing

## 2023-01-13 NOTE — NURSING
"Asked pt if nurse could put SCD hose on her. Pt refuses. Informed pt that these prevent blood clots. Nurse reminded pt of history of DVT & PE. Pt states "I know, but I haven't had any since then." Nurse informed pt that risk of blood clots increase after surgery. Pt states "No, I know all the risks. I'm not going to wear them."  "

## 2023-01-14 LAB
ANION GAP SERPL CALCULATED.3IONS-SCNC: 9 MMOL/L (ref 7–16)
BASOPHILS # BLD AUTO: 0.03 K/UL (ref 0–0.2)
BASOPHILS NFR BLD AUTO: 0.4 % (ref 0–1)
BUN SERPL-MCNC: 9 MG/DL (ref 7–18)
BUN/CREAT SERPL: 11 (ref 6–20)
CALCIUM SERPL-MCNC: 7.9 MG/DL (ref 8.5–10.1)
CHLORIDE SERPL-SCNC: 99 MMOL/L (ref 98–107)
CO2 SERPL-SCNC: 29 MMOL/L (ref 21–32)
CREAT SERPL-MCNC: 0.79 MG/DL (ref 0.55–1.02)
DIFFERENTIAL METHOD BLD: ABNORMAL
EGFR (NO RACE VARIABLE) (RUSH/TITUS): 80 ML/MIN/1.73M²
EOSINOPHIL # BLD AUTO: 0.37 K/UL (ref 0–0.5)
EOSINOPHIL NFR BLD AUTO: 4.8 % (ref 1–4)
EOSINOPHIL NFR BLD MANUAL: 4 % (ref 1–4)
ERYTHROCYTE [DISTWIDTH] IN BLOOD BY AUTOMATED COUNT: 14 % (ref 11.5–14.5)
GLUCOSE SERPL-MCNC: 117 MG/DL (ref 74–106)
HCT VFR BLD AUTO: 31 % (ref 38–47)
HGB BLD-MCNC: 10 G/DL (ref 12–16)
LYMPHOCYTES # BLD AUTO: 2.18 K/UL (ref 1–4.8)
LYMPHOCYTES NFR BLD AUTO: 28.1 % (ref 27–41)
LYMPHOCYTES NFR BLD MANUAL: 26 % (ref 27–41)
MCH RBC QN AUTO: 29.3 PG (ref 27–31)
MCHC RBC AUTO-ENTMCNC: 32.3 G/DL (ref 32–36)
MCV RBC AUTO: 90.9 FL (ref 80–96)
MONOCYTES # BLD AUTO: 0.92 K/UL (ref 0–0.8)
MONOCYTES NFR BLD AUTO: 11.9 % (ref 2–6)
MONOCYTES NFR BLD MANUAL: 13 % (ref 2–6)
MPC BLD CALC-MCNC: 9.5 FL (ref 9.4–12.4)
NEUTROPHILS # BLD AUTO: 4.25 K/UL (ref 1.8–7.7)
NEUTROPHILS NFR BLD AUTO: 54.8 % (ref 53–65)
NEUTS BAND NFR BLD MANUAL: 5 % (ref 1–5)
NEUTS SEG NFR BLD MANUAL: 52 % (ref 50–62)
NRBC BLD MANUAL-RTO: ABNORMAL %
PLATELET # BLD AUTO: 273 K/UL (ref 150–400)
PLATELET MORPHOLOGY: NORMAL
POTASSIUM SERPL-SCNC: 3.5 MMOL/L (ref 3.5–5.1)
PREALB SERPL NEPH-MCNC: 6 MG/DL (ref 20–40)
RBC # BLD AUTO: 3.41 M/UL (ref 4.2–5.4)
RBC MORPH BLD: NORMAL
SODIUM SERPL-SCNC: 133 MMOL/L (ref 136–145)
WBC # BLD AUTO: 7.75 K/UL (ref 4.5–11)

## 2023-01-14 PROCEDURE — 99900035 HC TECH TIME PER 15 MIN (STAT)

## 2023-01-14 PROCEDURE — 25000003 PHARM REV CODE 250: Performed by: NURSE PRACTITIONER

## 2023-01-14 PROCEDURE — 85025 COMPLETE CBC W/AUTO DIFF WBC: CPT | Performed by: NURSE PRACTITIONER

## 2023-01-14 PROCEDURE — 25000242 PHARM REV CODE 250 ALT 637 W/ HCPCS: Performed by: FAMILY MEDICINE

## 2023-01-14 PROCEDURE — 94640 AIRWAY INHALATION TREATMENT: CPT

## 2023-01-14 PROCEDURE — 94761 N-INVAS EAR/PLS OXIMETRY MLT: CPT

## 2023-01-14 PROCEDURE — 63600175 PHARM REV CODE 636 W HCPCS: Performed by: FAMILY MEDICINE

## 2023-01-14 PROCEDURE — 80048 BASIC METABOLIC PNL TOTAL CA: CPT | Performed by: NURSE PRACTITIONER

## 2023-01-14 PROCEDURE — 11000004 HC SNF PRIVATE

## 2023-01-14 PROCEDURE — 36415 COLL VENOUS BLD VENIPUNCTURE: CPT | Performed by: NURSE PRACTITIONER

## 2023-01-14 PROCEDURE — 27202174 HC DRESSING, SILVERLON ISLAND

## 2023-01-14 RX ORDER — IBUPROFEN 600 MG/1
600 TABLET ORAL EVERY 6 HOURS PRN
Status: DISCONTINUED | OUTPATIENT
Start: 2023-01-15 | End: 2023-01-22 | Stop reason: HOSPADM

## 2023-01-14 RX ORDER — LEVOFLOXACIN 250 MG/1
750 TABLET ORAL DAILY
Status: COMPLETED | OUTPATIENT
Start: 2023-01-14 | End: 2023-01-20

## 2023-01-14 RX ADMIN — MUPIROCIN: 20 OINTMENT TOPICAL at 09:01

## 2023-01-14 RX ADMIN — PANTOPRAZOLE SODIUM 40 MG: 40 TABLET, DELAYED RELEASE ORAL at 09:01

## 2023-01-14 RX ADMIN — ONDANSETRON 4 MG: 4 TABLET, ORALLY DISINTEGRATING ORAL at 06:01

## 2023-01-14 RX ADMIN — Medication 6 MG: at 08:01

## 2023-01-14 RX ADMIN — HYDROCODONE BITARTRATE AND ACETAMINOPHEN 1 TABLET: 10; 325 TABLET ORAL at 04:01

## 2023-01-14 RX ADMIN — GUAIFENESIN AND DEXTROMETHORPHAN 10 ML: 100; 10 SYRUP ORAL at 04:01

## 2023-01-14 RX ADMIN — ONDANSETRON 4 MG: 4 TABLET, ORALLY DISINTEGRATING ORAL at 04:01

## 2023-01-14 RX ADMIN — LEVOFLOXACIN 750 MG: 250 TABLET, FILM COATED ORAL at 11:01

## 2023-01-14 RX ADMIN — OXYBUTYNIN CHLORIDE 5 MG: 5 TABLET, EXTENDED RELEASE ORAL at 09:01

## 2023-01-14 RX ADMIN — MUPIROCIN: 20 OINTMENT TOPICAL at 08:01

## 2023-01-14 RX ADMIN — HYDROCODONE BITARTRATE AND ACETAMINOPHEN 1 TABLET: 10; 325 TABLET ORAL at 06:01

## 2023-01-14 RX ADMIN — SENNOSIDES AND DOCUSATE SODIUM 1 TABLET: 8.6; 5 TABLET ORAL at 09:01

## 2023-01-14 RX ADMIN — HYDROCODONE BITARTRATE AND ACETAMINOPHEN 1 TABLET: 10; 325 TABLET ORAL at 11:01

## 2023-01-14 RX ADMIN — SENNOSIDES AND DOCUSATE SODIUM 1 TABLET: 8.6; 5 TABLET ORAL at 08:01

## 2023-01-14 RX ADMIN — FONDAPARINUX SODIUM 2.5 MG: 2.5 INJECTION SUBCUTANEOUS at 09:01

## 2023-01-14 RX ADMIN — ALBUTEROL SULFATE 2.5 MG: 2.5 SOLUTION RESPIRATORY (INHALATION) at 06:01

## 2023-01-14 NOTE — HPI
71 y/o WF with a PMH of CAD with stent, dyslipidemia, HX of DVT and PE, obesity, GERD, and ashtma present to [facility] for admission to [facility]. Pt is S/P right hip arthopasty on 1/10/23 by Dr. Germán Barron. Pt has had the left hip replaced in the past. Has been working with PT while at [facility] and will be admitted for PT/OT and pain control.

## 2023-01-14 NOTE — PLAN OF CARE
Problem: Adult Inpatient Plan of Care  Goal: Plan of Care Review  Outcome: Ongoing, Progressing  Goal: Patient-Specific Goal (Individualized)  Outcome: Ongoing, Progressing  Goal: Absence of Hospital-Acquired Illness or Injury  Outcome: Ongoing, Progressing  Goal: Optimal Comfort and Wellbeing  Outcome: Ongoing, Progressing  Goal: Readiness for Transition of Care  Outcome: Ongoing, Progressing     Problem: Fall Injury Risk  Goal: Absence of Fall and Fall-Related Injury  Outcome: Ongoing, Progressing     Problem: Fatigue  Goal: Improved Activity Tolerance  Outcome: Ongoing, Progressing     Problem: Pain Acute  Goal: Acceptable Pain Control and Functional Ability  Outcome: Ongoing, Progressing     Problem: Skin Injury Risk Increased  Goal: Skin Health and Integrity  Outcome: Ongoing, Progressing

## 2023-01-14 NOTE — H&P
[facility]  Hospital Medicine  History & Physical    Patient Name: Marci Aguilera  MRN: [id number]  Patient Class: IP- Swing  Admission Date: 1/13/2023  Attending Physician: Stew Spicer DO   Primary Care Provider: ANKUSH Hunter         Patient information was obtained from patient and ER records.     Subjective:     Principal Problem:S/P total right hip arthroplasty    Chief Complaint: No chief complaint on file.       HPI: 73 y/o WF with a PMH of CAD with stent, dyslipidemia, HX of DVT and PE, obesity, GERD, and ashtma present to [facility] for admission to [facility]. Pt is S/P right hip arthopasty on 1/10/23 by Dr. Germán Barron. Pt has had the left hip replaced in the past. Has been working with PT while at [facility] and will be admitted for PT/OT and pain control.       Past Medical History:   Diagnosis Date    Acute superficial gastritis without hemorrhage 8/15/2022    Atrophic vaginitis 05/12/2004    Coronary artery disease     Cystitis 02/2004    Deep vein thrombosis     DVT (deep venous thrombosis)     Esophageal dysphagia 12/27/2021    Esophagitis 12/27/2021    HH (hiatus hernia) 12/27/2021    Pulmonary embolus     after Septic ~ 2019    Thyroid disease     Vitamin D deficiency        Past Surgical History:   Procedure Laterality Date    CATARACT EXTRACTION      CHOLECYSTECTOMY      CORONARY STENT PLACEMENT      per  2020    FLUOROSCOPIC URODYNAMIC STUDY  10/28/2019    interpreted on 10/29/2019 by Dr. Rudolph Bocanegra;  bladder pain during filling at 320cc,  urodynamic stress incontinence at 302cc without ISD,  no evidence of OAB symtomatology    HIP REPLACEMENT ARTHROPLASTY      JOINT REPLACEMENT      LEFT HEART CATHETERIZATION N/A 6/7/2021    Procedure: Left heart cath with possible intervention;  Surgeon: Chris Dave MD;  Location: [facility] CATH LAB;  Service: Cardiology;  Laterality: N/A;    rotator cuff      TOTAL ABDOMINAL  HYSTERECTOMY      Transobtruator Tape Urethral Sling procedure and limited Cystoscopy  01/29/2020    at [facility];  no complications       Review of patient's allergies indicates:   Allergen Reactions    Statins-hmg-coa reductase inhibitors Other (See Comments)     Refuses to take due to causing muscle aches and joint pain    Chlorpheniramine     Codeine     Diphtheria toxin preparations     Dristan allergy Other (See Comments)     hallucinations    Oxymetazoline     Pheniramine     Phenylephrine     Pseudoephedrine     Tetanus vaccines and toxoid Other (See Comments)     Arm pain and swelling    Penicillins Rash       No current facility-administered medications on file prior to encounter.     Current Outpatient Medications on File Prior to Encounter   Medication Sig    albuterol (VENTOLIN HFA) 90 mcg/actuation inhaler Inhale 2 puffs into the lungs every 6 (six) hours as needed for Wheezing. Rescue    aspirin (ECOTRIN) 81 MG EC tablet Take 81 mg by mouth once daily.    estradioL (ESTRACE) 0.01 % (0.1 mg/gram) vaginal cream Place 1 g vaginally twice a week.    fluticasone furoate (ARNUITY ELLIPTA) 200 mcg/actuation inhaler Inhale 1 puff into the lungs once daily. (Patient taking differently: Inhale 1 puff into the lungs as needed.)    oxybutynin (DITROPAN-XL) 5 MG TR24 Take 1 tablet (5 mg total) by mouth once daily.    pantoprazole (PROTONIX) 40 MG tablet Take 1 tablet (40 mg total) by mouth once daily.    budesonide-formoterol 160-4.5 mcg (SYMBICORT) 160-4.5 mcg/actuation HFAA Inhale 2 puffs into the lungs every 12 (twelve) hours. Controller (Patient not taking: No sig reported)    vitamin D (VITAMIN D3) 1000 units Tab Take 1,000 Units by mouth once daily.     Family History       Problem Relation (Age of Onset)    Cancer Sister    Heart disease Father, Mother, Sister    Pancreatic cancer Paternal Grandmother    Stroke Brother    Uterine cancer Sister           Tobacco Use    Smoking status: Never    Smokeless tobacco: Never   Substance and Sexual Activity    Alcohol use: Yes     Comment: occassional    Drug use: Not Currently     Types: Flunitrazepam    Sexual activity: Yes     Review of Systems   Constitutional: Negative.    HENT: Negative.     Eyes: Negative.    Respiratory:  Positive for cough. Negative for shortness of breath.    Cardiovascular: Negative.  Negative for chest pain, palpitations and leg swelling.   Gastrointestinal: Negative.  Negative for diarrhea, nausea and vomiting.   Endocrine: Negative.    Genitourinary: Negative.    Musculoskeletal: Negative.  Negative for arthralgias, back pain and neck pain.        Right hip pain   Skin: Negative.    Neurological: Negative.    Psychiatric/Behavioral: Negative.     All other systems reviewed and are negative.  Objective:     Vital Signs (Most Recent):  Temp: 97.5 °F (36.4 °C) (01/13/23 1030)  Pulse: 102 (01/13/23 1557)  Resp: 18 (01/13/23 1557)  BP: (!) 148/69 (01/13/23 1533)  SpO2: 97 % (01/13/23 1557)   Vital Signs (24h Range):  Temp:  [97.5 °F (36.4 °C)] 97.5 °F (36.4 °C)  Pulse:  [] 102  Resp:  [17-18] 18  SpO2:  [94 %-97 %] 97 %  BP: (136-148)/(69-76) 148/69     Weight: 96.6 kg (213 lb)  Body mass index is 32.39 kg/m².    Physical Exam  Vitals and nursing note reviewed.   Constitutional:       Appearance: Normal appearance. She is obese.   HENT:      Head: Normocephalic and atraumatic.      Right Ear: Tympanic membrane normal.      Left Ear: Tympanic membrane normal.      Nose: Nose normal.      Mouth/Throat:      Mouth: Mucous membranes are moist.      Pharynx: Oropharynx is clear.   Eyes:      Extraocular Movements: Extraocular movements intact.      Pupils: Pupils are equal, round, and reactive to light.   Cardiovascular:      Rate and Rhythm: Normal rate and regular rhythm.      Pulses: Normal pulses.      Heart sounds: Normal heart sounds.   Pulmonary:      Effort: Pulmonary effort is  normal.      Breath sounds: Normal breath sounds.   Abdominal:      General: Bowel sounds are normal.      Palpations: Abdomen is soft.   Musculoskeletal:         General: Normal range of motion.      Cervical back: Normal range of motion.      Comments: Dressing in place to right hip, clean, dry, and intact. Minimal post op swelling noted with no bruising noted.    Skin:     General: Skin is warm and dry.   Neurological:      General: No focal deficit present.      Mental Status: She is alert and oriented to person, place, and time.   Psychiatric:         Mood and Affect: Mood normal.         CRANIAL NERVES     CN III, IV, VI   Pupils are equal, round, and reactive to light.     Significant Labs: All pertinent labs within the past 24 hours have been reviewed.  BMP:   Recent Labs   Lab 01/13/23  1451   *   *   K 3.7   CL 99   CO2 29   BUN 10   CREATININE 0.97   CALCIUM 7.9*     CBC:   Recent Labs   Lab 01/13/23  1451   WBC 8.71   HGB 10.2*   HCT 31.9*          Significant Imaging: I have reviewed all pertinent imaging results/findings within the past 24 hours.    Assessment/Plan:     * S/P total right hip arthroplasty    Performed by Dr. Germán Barron on 1/10/23  PT/OT  Daily dressing changes  DC staples and place steri strips on 1/24/23  F/U with Dr. Germán Barron in 4 weeks    History of DVT (deep vein thrombosis)    VTE prophylaxis:  CIRO/SCD's  Early ambulation  Arixtra x 5 days, then switch to enteric coated ASA daily.       Gastroesophageal reflux disease    Continue home meds    CAD (coronary artery disease)    Continue home meds  Cardiac diet  Weekly labs  VS Q shift      VTE Risk Mitigation (From admission, onward)         Ordered     fondaparinux injection 2.5 mg  Daily         01/13/23 1519     IP VTE HIGH RISK PATIENT  Once         01/13/23 1241     Place CIRO hose  Until discontinued         01/13/23 1241     Place sequential compression device  Until discontinued         01/13/23 1241                    ANKUSH Santos  Department of Hospital Medicine   [facility]

## 2023-01-14 NOTE — NURSING
COSME Canseco NP notified of pt's chest x-ray results; Benny states he's spoken with pt about results

## 2023-01-14 NOTE — HOSPITAL COURSE
01/14/2023:  Left-sided pneumonia noted on chest x-ray from today.  Patient reports having a cough following surgery.  Afebrile.  Vital signs stable.  Room air sat 92 to 97%.  Respirations even and nonlabored.  Lung sounds diminished at the left base, otherwise clear.  Levaquin 750 mg p.o. daily x7 days added.  Patient already has incentive spirometer and albuterol. Discussed with Dr. Robbins       01/20/2023.  Patient doing better with physical therapy.  She wants to try to stay till Monday to do physical therapy.--    01/22/2023 discharge summary the patient has done well with physical therapy and occupational therapy.  She still has staples in but will be following up for her staple removal she will be discharged home today she went home yesterday on leave of absence to find out that she will be able to handle it she will be following outpatient physical therapy.

## 2023-01-14 NOTE — SUBJECTIVE & OBJECTIVE
Past Medical History:   Diagnosis Date    Acute superficial gastritis without hemorrhage 8/15/2022    Atrophic vaginitis 05/12/2004    Coronary artery disease     Cystitis 02/2004    Deep vein thrombosis     DVT (deep venous thrombosis)     Esophageal dysphagia 12/27/2021    Esophagitis 12/27/2021    HH (hiatus hernia) 12/27/2021    Pulmonary embolus     after Septic ~ 2019    Thyroid disease     Vitamin D deficiency        Past Surgical History:   Procedure Laterality Date    CATARACT EXTRACTION      CHOLECYSTECTOMY      CORONARY STENT PLACEMENT      per  2020    FLUOROSCOPIC URODYNAMIC STUDY  10/28/2019    interpreted on 10/29/2019 by Dr. Rudolph Bocanegra;  bladder pain during filling at 320cc,  urodynamic stress incontinence at 302cc without ISD,  no evidence of OAB symtomatology    HIP REPLACEMENT ARTHROPLASTY      JOINT REPLACEMENT      LEFT HEART CATHETERIZATION N/A 6/7/2021    Procedure: Left heart cath with possible intervention;  Surgeon: Chris Dave MD;  Location: [facility] CATH LAB;  Service: Cardiology;  Laterality: N/A;    rotator cuff      TOTAL ABDOMINAL HYSTERECTOMY      Transobtruator Tape Urethral Sling procedure and limited Cystoscopy  01/29/2020    at [facility];  no complications       Review of patient's allergies indicates:   Allergen Reactions    Statins-hmg-coa reductase inhibitors Other (See Comments)     Refuses to take due to causing muscle aches and joint pain    Chlorpheniramine     Codeine     Diphtheria toxin preparations     Dristan allergy Other (See Comments)     hallucinations    Oxymetazoline     Pheniramine     Phenylephrine     Pseudoephedrine     Tetanus vaccines and toxoid Other (See Comments)     Arm pain and swelling    Penicillins Rash       No current facility-administered medications on file prior to encounter.     Current Outpatient Medications on File Prior to Encounter   Medication Sig    albuterol (VENTOLIN HFA) 90  mcg/actuation inhaler Inhale 2 puffs into the lungs every 6 (six) hours as needed for Wheezing. Rescue    aspirin (ECOTRIN) 81 MG EC tablet Take 81 mg by mouth once daily.    estradioL (ESTRACE) 0.01 % (0.1 mg/gram) vaginal cream Place 1 g vaginally twice a week.    fluticasone furoate (ARNUITY ELLIPTA) 200 mcg/actuation inhaler Inhale 1 puff into the lungs once daily. (Patient taking differently: Inhale 1 puff into the lungs as needed.)    oxybutynin (DITROPAN-XL) 5 MG TR24 Take 1 tablet (5 mg total) by mouth once daily.    pantoprazole (PROTONIX) 40 MG tablet Take 1 tablet (40 mg total) by mouth once daily.    budesonide-formoterol 160-4.5 mcg (SYMBICORT) 160-4.5 mcg/actuation HFAA Inhale 2 puffs into the lungs every 12 (twelve) hours. Controller (Patient not taking: No sig reported)    vitamin D (VITAMIN D3) 1000 units Tab Take 1,000 Units by mouth once daily.     Family History       Problem Relation (Age of Onset)    Cancer Sister    Heart disease Father, Mother, Sister    Pancreatic cancer Paternal Grandmother    Stroke Brother    Uterine cancer Sister          Tobacco Use    Smoking status: Never    Smokeless tobacco: Never   Substance and Sexual Activity    Alcohol use: Yes     Comment: occassional    Drug use: Not Currently     Types: Flunitrazepam    Sexual activity: Yes     Review of Systems   Constitutional: Negative.    HENT: Negative.     Eyes: Negative.    Respiratory:  Positive for cough. Negative for shortness of breath.    Cardiovascular: Negative.  Negative for chest pain, palpitations and leg swelling.   Gastrointestinal: Negative.  Negative for diarrhea, nausea and vomiting.   Endocrine: Negative.    Genitourinary: Negative.    Musculoskeletal: Negative.  Negative for arthralgias, back pain and neck pain.        Right hip pain   Skin: Negative.    Neurological: Negative.    Psychiatric/Behavioral: Negative.     All other systems reviewed and are negative.  Objective:     Vital Signs (Most  Recent):  Temp: 97.5 °F (36.4 °C) (01/13/23 1030)  Pulse: 102 (01/13/23 1557)  Resp: 18 (01/13/23 1557)  BP: (!) 148/69 (01/13/23 1533)  SpO2: 97 % (01/13/23 1557)   Vital Signs (24h Range):  Temp:  [97.5 °F (36.4 °C)] 97.5 °F (36.4 °C)  Pulse:  [] 102  Resp:  [17-18] 18  SpO2:  [94 %-97 %] 97 %  BP: (136-148)/(69-76) 148/69     Weight: 96.6 kg (213 lb)  Body mass index is 32.39 kg/m².    Physical Exam  Vitals and nursing note reviewed.   Constitutional:       Appearance: Normal appearance. She is obese.   HENT:      Head: Normocephalic and atraumatic.      Right Ear: Tympanic membrane normal.      Left Ear: Tympanic membrane normal.      Nose: Nose normal.      Mouth/Throat:      Mouth: Mucous membranes are moist.      Pharynx: Oropharynx is clear.   Eyes:      Extraocular Movements: Extraocular movements intact.      Pupils: Pupils are equal, round, and reactive to light.   Cardiovascular:      Rate and Rhythm: Normal rate and regular rhythm.      Pulses: Normal pulses.      Heart sounds: Normal heart sounds.   Pulmonary:      Effort: Pulmonary effort is normal.      Breath sounds: Normal breath sounds.   Abdominal:      General: Bowel sounds are normal.      Palpations: Abdomen is soft.   Musculoskeletal:         General: Normal range of motion.      Cervical back: Normal range of motion.      Comments: Dressing in place to right hip, clean, dry, and intact. Minimal post op swelling noted with no bruising noted.    Skin:     General: Skin is warm and dry.   Neurological:      General: No focal deficit present.      Mental Status: She is alert and oriented to person, place, and time.   Psychiatric:         Mood and Affect: Mood normal.         CRANIAL NERVES     CN III, IV, VI   Pupils are equal, round, and reactive to light.     Significant Labs: All pertinent labs within the past 24 hours have been reviewed.  BMP:   Recent Labs   Lab 01/13/23  1451   *   *   K 3.7   CL 99   CO2 29   BUN 10    CREATININE 0.97   CALCIUM 7.9*     CBC:   Recent Labs   Lab 01/13/23  1451   WBC 8.71   HGB 10.2*   HCT 31.9*          Significant Imaging: I have reviewed all pertinent imaging results/findings within the past 24 hours.

## 2023-01-15 PROCEDURE — 11000004 HC SNF PRIVATE

## 2023-01-15 PROCEDURE — 63600175 PHARM REV CODE 636 W HCPCS: Performed by: FAMILY MEDICINE

## 2023-01-15 PROCEDURE — 99900035 HC TECH TIME PER 15 MIN (STAT)

## 2023-01-15 PROCEDURE — 25000242 PHARM REV CODE 250 ALT 637 W/ HCPCS: Performed by: FAMILY MEDICINE

## 2023-01-15 PROCEDURE — 94761 N-INVAS EAR/PLS OXIMETRY MLT: CPT

## 2023-01-15 PROCEDURE — 25000003 PHARM REV CODE 250: Performed by: NURSE PRACTITIONER

## 2023-01-15 PROCEDURE — 25000003 PHARM REV CODE 250: Performed by: PHYSICIAN ASSISTANT

## 2023-01-15 PROCEDURE — 94640 AIRWAY INHALATION TREATMENT: CPT

## 2023-01-15 RX ADMIN — LEVOFLOXACIN 750 MG: 250 TABLET, FILM COATED ORAL at 08:01

## 2023-01-15 RX ADMIN — GUAIFENESIN AND DEXTROMETHORPHAN 10 ML: 100; 10 SYRUP ORAL at 06:01

## 2023-01-15 RX ADMIN — IBUPROFEN 600 MG: 600 TABLET, FILM COATED ORAL at 06:01

## 2023-01-15 RX ADMIN — HYDROCODONE BITARTRATE AND ACETAMINOPHEN 1 TABLET: 10; 325 TABLET ORAL at 09:01

## 2023-01-15 RX ADMIN — SENNOSIDES AND DOCUSATE SODIUM 1 TABLET: 8.6; 5 TABLET ORAL at 08:01

## 2023-01-15 RX ADMIN — IBUPROFEN 600 MG: 600 TABLET, FILM COATED ORAL at 12:01

## 2023-01-15 RX ADMIN — MUPIROCIN: 20 OINTMENT TOPICAL at 08:01

## 2023-01-15 RX ADMIN — ALBUTEROL SULFATE 2.5 MG: 2.5 SOLUTION RESPIRATORY (INHALATION) at 07:01

## 2023-01-15 RX ADMIN — OXYBUTYNIN CHLORIDE 5 MG: 5 TABLET, EXTENDED RELEASE ORAL at 08:01

## 2023-01-15 RX ADMIN — ONDANSETRON 4 MG: 4 TABLET, ORALLY DISINTEGRATING ORAL at 10:01

## 2023-01-15 RX ADMIN — FONDAPARINUX SODIUM 2.5 MG: 2.5 INJECTION SUBCUTANEOUS at 08:01

## 2023-01-15 RX ADMIN — IBUPROFEN 600 MG: 600 TABLET, FILM COATED ORAL at 08:01

## 2023-01-15 RX ADMIN — ONDANSETRON 4 MG: 4 TABLET, ORALLY DISINTEGRATING ORAL at 06:01

## 2023-01-15 RX ADMIN — PANTOPRAZOLE SODIUM 40 MG: 40 TABLET, DELAYED RELEASE ORAL at 08:01

## 2023-01-15 RX ADMIN — HYDROCODONE BITARTRATE AND ACETAMINOPHEN 1 TABLET: 10; 325 TABLET ORAL at 10:01

## 2023-01-16 PROCEDURE — 11000004 HC SNF PRIVATE

## 2023-01-16 PROCEDURE — 25000242 PHARM REV CODE 250 ALT 637 W/ HCPCS: Performed by: FAMILY MEDICINE

## 2023-01-16 PROCEDURE — 25000003 PHARM REV CODE 250: Performed by: NURSE PRACTITIONER

## 2023-01-16 PROCEDURE — 94761 N-INVAS EAR/PLS OXIMETRY MLT: CPT

## 2023-01-16 PROCEDURE — 97110 THERAPEUTIC EXERCISES: CPT

## 2023-01-16 PROCEDURE — 63600175 PHARM REV CODE 636 W HCPCS: Performed by: FAMILY MEDICINE

## 2023-01-16 PROCEDURE — 94640 AIRWAY INHALATION TREATMENT: CPT

## 2023-01-16 PROCEDURE — 97116 GAIT TRAINING THERAPY: CPT

## 2023-01-16 PROCEDURE — 25000003 PHARM REV CODE 250: Performed by: PHYSICIAN ASSISTANT

## 2023-01-16 PROCEDURE — 99900035 HC TECH TIME PER 15 MIN (STAT)

## 2023-01-16 RX ADMIN — ALBUTEROL SULFATE 2.5 MG: 2.5 SOLUTION RESPIRATORY (INHALATION) at 07:01

## 2023-01-16 RX ADMIN — IBUPROFEN 600 MG: 600 TABLET, FILM COATED ORAL at 05:01

## 2023-01-16 RX ADMIN — HYDROCODONE BITARTRATE AND ACETAMINOPHEN 1 TABLET: 10; 325 TABLET ORAL at 11:01

## 2023-01-16 RX ADMIN — SENNOSIDES AND DOCUSATE SODIUM 1 TABLET: 8.6; 5 TABLET ORAL at 09:01

## 2023-01-16 RX ADMIN — MUPIROCIN: 20 OINTMENT TOPICAL at 08:01

## 2023-01-16 RX ADMIN — HYDROCODONE BITARTRATE AND ACETAMINOPHEN 1 TABLET: 10; 325 TABLET ORAL at 08:01

## 2023-01-16 RX ADMIN — MUPIROCIN: 20 OINTMENT TOPICAL at 09:01

## 2023-01-16 RX ADMIN — SENNOSIDES AND DOCUSATE SODIUM 1 TABLET: 8.6; 5 TABLET ORAL at 08:01

## 2023-01-16 RX ADMIN — IBUPROFEN 600 MG: 600 TABLET, FILM COATED ORAL at 09:01

## 2023-01-16 RX ADMIN — FONDAPARINUX SODIUM 2.5 MG: 2.5 INJECTION SUBCUTANEOUS at 09:01

## 2023-01-16 RX ADMIN — ONDANSETRON 4 MG: 4 TABLET, ORALLY DISINTEGRATING ORAL at 08:01

## 2023-01-16 RX ADMIN — LEVOFLOXACIN 750 MG: 250 TABLET, FILM COATED ORAL at 09:01

## 2023-01-16 NOTE — CONSULTS
"  [facility] - Medical Surgical Unit  Adult Nutrition  Consult Note    SUMMARY     Recommendations    Recommendation/Intervention: 1. Regular diet 2. food prefs  Goals: Meet EEN >75%  Nutrition Goal Status: new    Assessment and Plan    No new Assessment & Plan notes have been filed under this hospital service since the last note was generated.  Service: Nutrition       Malnutrition Assessment  Malnutrition Type:  (Based on assessment this pt is not malnourished.)                                    Reason for Assessment    Reason For Assessment: consult (swing bed pt)  Diagnosis:  (s/p R THR)  Relevant Medical History: CAD with stents, HLD, Obesity, GERD, PE, dysphagia  Interdisciplinary Rounds: did not attend  General Information Comments: RDN visited pt this a.m. and she c/o foodservice issues and does not want to receive a cardiac diet.  RDN explainted rationale for cardiac diet but she wants to receive a regular diet.  Meal intake ~65%.  No problems with intake PTA.    Nutrition Risk Screen    Nutrition Risk Screen: no indicators present    Nutrition/Diet History    Patient Reported Diet/Restrictions/Preferences: general  Spiritual, Cultural Beliefs, Restorationist Practices, Values that Affect Care: no  Food Allergies: NKFA  Factors Affecting Nutritional Intake: decreased appetite    Anthropometrics    Temp: 97.7 °F (36.5 °C)  Height: 5' 8" (172.7 cm)  Height (inches): 68 in  Weight Method: Bed Scale  Weight: 96.6 kg (213 lb)  Weight (lb): 213 lb  Ideal Body Weight (IBW), Female: 140 lb  % Ideal Body Weight, Female (lb): 152.14 %  BMI (Calculated): 32.4  BMI Grade: 30 - 34.9- obesity - grade I       Lab/Procedures/Meds    Pertinent Labs Reviewed: reviewed  Pertinent Labs Comments: PAB 6, Alb 3.4, Hgb 10.0, Hct 31.0  Pertinent Medications Reviewed: reviewed  Pertinent Medications Comments: Levaquin, Protonix    Physical Findings/Assessment         Estimated/Assessed Needs    Weight Used For Calorie " Calculations: 72 kg (158 lb 11.7 oz)  Energy Calorie Requirements (kcal): 3444-2671  Energy Need Method: Kcal/kg  Protein Requirements: 72-79  Weight Used For Protein Calculations: 72 kg (158 lb 11.7 oz)     Estimated Fluid Requirement Method: RDA Method  RDA Method (mL): 1800         Nutrition Prescription Ordered    Current Diet Order: Cardiac  Nutrition Order Comments: wants regular diet    Evaluation of Received Nutrient/Fluid Intake    Energy Calories Required: not meeting needs  Protein Required: not meeting needs  Fluid Required: not meeting needs  Comments: Intake should improve with recovery and diet change.  Tolerance: not tolerating  % Intake of Estimated Energy Needs: 50 - 75 %  % Meal Intake: 50 - 75 %    Nutrition Risk    Level of Risk/Frequency of Follow-up: moderate       Monitor and Evaluation    Food and Nutrient Intake: food and beverage intake  Biochemical Data, Medical Tests and Procedures: electrolyte and renal panel  Nutrition-Focused Physical Findings: overall appearance, skin       Nutrition Follow-Up    RD Follow-up?: Yes

## 2023-01-16 NOTE — PT/OT/SLP PROGRESS
Physical Therapy Treatment    Patient Name:  Marci Aguilera   MRN:  [id number]    Recommendations:     Discharge Recommendations: home with home health  Discharge Equipment Recommendations: walker, rolling  Barriers to discharge: None    Assessment:     Marci Aguilera is a 72 y.o. female admitted with a medical diagnosis of S/P total right hip arthroplasty.  She presents with the following impairments/functional limitations: weakness, impaired self care skills, impaired functional mobility, gait instability, impaired balance, decreased lower extremity function .    Rehab Prognosis: Good; patient would benefit from acute skilled PT services to address these deficits and reach maximum level of function.    Recent Surgery: * No surgery found *      Plan:     During this hospitalization, patient to be seen BID (5 days per week) to address the identified rehab impairments via gait training, therapeutic activities, therapeutic exercises, neuromuscular re-education and progress toward the following goals:    Plan of Care Expires:  02/10/23    Subjective     Chief Complaint: mild post therapy right hip soreness.  Patient/Family Comments/goals: to improve mobility and strength to return home.  Pain/Comfort:  Pain Rating 1: 0/10      Objective:     Communicated with patient prior to session.  Patient found up in chair with   upon PT entry to room.     General Precautions: Standard, fall  Orthopedic Precautions: RLE weight bearing as tolerated, RLE posterior precautions  Braces: N/A  Respiratory Status: Room air     Functional Mobility:  Bed Mobility:     Sit to Supine: minimum assistance  Transfers:     Sit to Stand:  contact guard assistance with rolling walker  Gait: 176 feet with RW SBA.  Balance: Good      AM-PAC 6 CLICK MOBILITY  Turning over in bed (including adjusting bedclothes, sheets and blankets)?: 4  Sitting down on and standing up from a chair with arms (e.g., wheelchair, bedside commode, etc.): 4  Moving  from lying on back to sitting on the side of the bed?: 3  Moving to and from a bed to a chair (including a wheelchair)?: 4  Need to walk in hospital room?: 3  Climbing 3-5 steps with a railing?: 1  Basic Mobility Total Score: 19       Treatment & Education:  Pt performed the following right LE exercises to improve strength and mobility:  LAQ 3 x 10, hip flexion 2 x 10 , hip abduction/adduction and hamstring curls with red Theraband 3 x 10  SciFit stepper machine x 8 minutes    Gait training: pt ambulated in hallway on level surface with RW x 176 feet x 2 attempts; patient able to demonstrate reciprocal steps with short swing phase of RLE.    Patient left supine with call button in reach..    GOALS:   Multidisciplinary Problems       Physical Therapy Goals          Problem: Physical Therapy    Goal Priority Disciplines Outcome Goal Variances Interventions   Physical Therapy Goal     PT, PT/OT Ongoing, Progressing     Description: Goals to be met by: 2 weeks     Patient will increase functional independence with mobility by performin. Supine to sit with Stand-by Assistance  2. Sit to supine with Stand-by Assistance  3. Rolling to Left and Right with Modified [place].  4. Sit to stand transfer with Stand-by Assistance  5. Bed to chair transfer with Stand-by Assistance using Rolling Walker  6. Gait  x 150 feet with Stand-by Assistance using Rolling Walker.     Goals to be met by: 4 weeks     Patient will increase functional independence with mobility by performin. Supine to sit with Modified [place]  2. Sit to supine with Modified [place]  3. Sit to stand transfer with Modified [place]  4. Bed to chair transfer with Modified [place] using Rolling Walker  5. Gait  x 300 feet with Modified [place] using Rolling Walker.                          Time Tracking:     PT Received On: 23  PT Start Time: 1300     PT Stop Time: 1345  PT Total Time (min): 45 min     Billable  Minutes: Gait Training 15 and Therapeutic Exercise 25    Treatment Type: Treatment  PT/PTA: PT           01/16/2023

## 2023-01-17 PROCEDURE — 63600175 PHARM REV CODE 636 W HCPCS: Performed by: FAMILY MEDICINE

## 2023-01-17 PROCEDURE — 27000981 HC MATTRESS, ACCUCAIR DAILY RENTAL

## 2023-01-17 PROCEDURE — 97110 THERAPEUTIC EXERCISES: CPT

## 2023-01-17 PROCEDURE — 27000944

## 2023-01-17 PROCEDURE — 97535 SELF CARE MNGMENT TRAINING: CPT

## 2023-01-17 PROCEDURE — 25000003 PHARM REV CODE 250: Performed by: NURSE PRACTITIONER

## 2023-01-17 PROCEDURE — 97116 GAIT TRAINING THERAPY: CPT | Mod: CQ

## 2023-01-17 PROCEDURE — 94761 N-INVAS EAR/PLS OXIMETRY MLT: CPT

## 2023-01-17 PROCEDURE — 99900035 HC TECH TIME PER 15 MIN (STAT)

## 2023-01-17 PROCEDURE — 97530 THERAPEUTIC ACTIVITIES: CPT

## 2023-01-17 PROCEDURE — 25000003 PHARM REV CODE 250: Performed by: FAMILY MEDICINE

## 2023-01-17 PROCEDURE — 11000004 HC SNF PRIVATE

## 2023-01-17 RX ORDER — MORPHINE SULFATE 10 MG/ML
10 INJECTION INTRAMUSCULAR; INTRAVENOUS; SUBCUTANEOUS NIGHTLY
Status: DISCONTINUED | OUTPATIENT
Start: 2023-01-17 | End: 2023-01-22 | Stop reason: HOSPADM

## 2023-01-17 RX ORDER — OXYCODONE AND ACETAMINOPHEN 5; 325 MG/1; MG/1
2 TABLET ORAL EVERY 4 HOURS PRN
Status: DISCONTINUED | OUTPATIENT
Start: 2023-01-17 | End: 2023-01-22 | Stop reason: HOSPADM

## 2023-01-17 RX ADMIN — HYDROCODONE BITARTRATE AND ACETAMINOPHEN 1 TABLET: 10; 325 TABLET ORAL at 08:01

## 2023-01-17 RX ADMIN — LEVOFLOXACIN 750 MG: 250 TABLET, FILM COATED ORAL at 08:01

## 2023-01-17 RX ADMIN — OXYCODONE AND ACETAMINOPHEN 2 TABLET: 325; 5 TABLET ORAL at 05:01

## 2023-01-17 RX ADMIN — SENNOSIDES AND DOCUSATE SODIUM 1 TABLET: 8.6; 5 TABLET ORAL at 08:01

## 2023-01-17 RX ADMIN — ACETAMINOPHEN 650 MG: 325 TABLET ORAL at 12:01

## 2023-01-17 RX ADMIN — FONDAPARINUX SODIUM 2.5 MG: 2.5 INJECTION SUBCUTANEOUS at 08:01

## 2023-01-17 RX ADMIN — MUPIROCIN: 20 OINTMENT TOPICAL at 08:01

## 2023-01-17 RX ADMIN — OXYBUTYNIN CHLORIDE 5 MG: 5 TABLET, EXTENDED RELEASE ORAL at 08:01

## 2023-01-17 NOTE — PLAN OF CARE
Pt. Reported that she did not rest much last night and was anxious about going down to work with Therapy today. Pt. Was given pain medication prior to working with Therapy and encouraged to ask for pain medication before she goes down to gym daily to work with Therapy. Voiced understanding. Will cont. To follow pt.

## 2023-01-17 NOTE — PT/OT/SLP PROGRESS
"Physical Therapy Treatment    Patient Name:  Marci Aguilera   MRN:  [id number]    Recommendations:     Discharge Recommendations: home with home health  Discharge Equipment Recommendations: walker, rolling  Barriers to discharge: None    Assessment:     Marci Aguilera is a 72 y.o. female admitted with a medical diagnosis of S/P total right hip arthroplasty.  She presents with the following impairments/functional limitations: weakness, impaired endurance, impaired functional mobility, gait instability, impaired balance, decreased lower extremity function, decreased safety awareness, pain, orthopedic precautions.   Pt tolerated treatment well with no increased pain complaints voiced.  Decreased ambulation distance noted with pt reporting that she didn't sleep well last night and does not feel like she can do anymore.    Rehab Prognosis: Good; patient would benefit from acute skilled PT services to address these deficits and reach maximum level of function.    Recent Surgery: * No surgery found *      Plan:     During this hospitalization, patient to be seen BID to address the identified rehab impairments via gait training, therapeutic exercises and progress toward the following goals:    Plan of Care Expires:  02/10/23    Subjective     Chief Complaint: Pt reports "I had a bad night."  Pt agrees to treatment in Rehab gym after just having received pain meds.  Patient/Family Comments/goals: To get stronger and more mobile to return home.  Pain/Comfort:  Pain Rating 1: 5/10  Location - Side 1: Right  Location 1: hip  Pain Rating Post-Intervention 1: 4/10      Objective:     Communicated with pt prior to session.  Patient found up in chair  upon PT entry to room.     General Precautions: Standard, fall  Orthopedic Precautions: RLE weight bearing as tolerated  Braces: N/A  Respiratory Status: Room air     Functional Mobility:  Transfers:     Sit to Stand:  contact guard assistance with rolling walker  Gait: Pt " received gait training with FWW for 70 ft x 2 with CGA and cueing to narrow BJORN slightly.  Pt exhibits short stance time on R LE and decreased step length with L LE.  Balance: Static standing balance is good-      AM-PAC 6 CLICK MOBILITY  Turning over in bed (including adjusting bedclothes, sheets and blankets)?: 3  Sitting down on and standing up from a chair with arms (e.g., wheelchair, bedside commode, etc.): 3  Moving from lying on back to sitting on the side of the bed?: 3  Moving to and from a bed to a chair (including a wheelchair)?: 3  Need to walk in hospital room?: 3  Climbing 3-5 steps with a railing?: 1  Basic Mobility Total Score: 16       Treatment & Education:  Pt performed Nustep x 10 mins for flexibility and strengthening.  Pt performed B LE seated Hip add squeezes, AP, LAQ, marching; hip abd and HS curls with red tband all x 3 sets 10 reps.  Pt performed standing R LE ex x 10 reps each consisting of hip flexion, hip abd using rw as support.      Patient left up in chair with call button in reach..    GOALS:   Multidisciplinary Problems       Physical Therapy Goals          Problem: Physical Therapy    Goal Priority Disciplines Outcome Goal Variances Interventions   Physical Therapy Goal     PT, PT/OT Ongoing, Progressing     Description: Goals to be met by: 2 weeks     Patient will increase functional independence with mobility by performin. Supine to sit with Stand-by Assistance  2. Sit to supine with Stand-by Assistance  3. Rolling to Left and Right with Modified [place].  4. Sit to stand transfer with Stand-by Assistance  5. Bed to chair transfer with Stand-by Assistance using Rolling Walker  6. Gait  x 150 feet with Stand-by Assistance using Rolling Walker.     Goals to be met by: 4 weeks     Patient will increase functional independence with mobility by performin. Supine to sit with Modified [place]  2. Sit to supine with Modified [place]  3. Sit to stand transfer  with Modified [place]  4. Bed to chair transfer with Modified [place] using Rolling Walker  5. Gait  x 300 feet with Modified [place] using Rolling Walker.                          Time Tracking:     PT Received On: 01/17/23  PT Start Time: 0900     PT Stop Time: 0950  PT Total Time (min): 50 min     Billable Minutes: Gait Training 15 and Therapeutic Exercise 35    Treatment Type: Treatment  PT/PTA: PT     PTA Visit Number: 0     01/17/2023

## 2023-01-17 NOTE — PT/OT/SLP PROGRESS
Occupational Therapy   Treatment    Name: Marci Aguilera  MRN: [id number]  Admitting Diagnosis:  S/P total right hip arthroplasty       Recommendations:     Discharge Recommendations: home with home health, outpatient OT  Discharge Equipment Recommendations:  walker, rolling  Barriers to discharge:       Assessment:     Marci Aguilera is a 72 y.o. female with a medical diagnosis of S/P total right hip arthroplasty.  She presents with decreased balance, limited ROM and decreased strength. Performance deficits affecting function are impaired endurance, weakness, impaired self care skills, impaired functional mobility, impaired balance, decreased safety awareness, decreased lower extremity function.     Rehab Prognosis:  Good and Fair; patient would benefit from acute skilled OT services to address these deficits and reach maximum level of function.       Plan:     Patient to be seen 5 x/week to address the above listed problems via self-care/home management, therapeutic activities, therapeutic exercises, neuromuscular re-education  Plan of Care Expires: 02/10/23  Plan of Care Reviewed with: patient    Subjective     Pain/Comfort:       Objective:     Communicated with: Pt prior to session.  Patient found supine   upon OT entry to room.    General Precautions: Standard, fall    Orthopedic Precautions:RUE weight bearing as tolerated  Braces:    Respiratory Status: Room air     Occupational Performance:     Bed Mobility:    Patient completed Supine to Sit with minimum assistance     Functional Mobility/Transfers:  Patient completed Sit <> Stand Transfer with contact guard assistance  with  rolling walker   Functional Mobility: Pt completed functional ambulation to the therapy gym using the RW for balance and safety.    Activities of Daily Living:    Sit to stand t/f (1 set of 10 reps from w/c to the RW) with OT providing cues on hand/foot placement      [facility] 6 Click ADL:      Treatment &  Education:    Therapeutic exercise:  While sitting in the w/c, the pt completed 2 sets of 20 reps of chest press and bicep curls 2 sets of 20 reps with 5# DB to increase BUE strength for ADLs and IADLS    Therapeutic activity:    While sitting in the w/c, the pt [id number] min on arm Sci Fit to increase endurance, BLE/BUE strength and activity tolerance for ADL performance    Patient left supine with all lines intact    GOALS:   Multidisciplinary Problems       Occupational Therapy Goals          Problem: Occupational Therapy    Goal Priority Disciplines Outcome Interventions   Occupational Therapy Goal     OT, PT/OT Ongoing, Progressing    Description: Goals to be met by: 02/13/23     Patient will increase functional independence with ADLs by performing:    UE Dressing with [place].  LE Dressing with Modified [place] using a/e as needed  Grooming while standing with Modified [place].  Toileting from toilet with Modified [place] for hygiene and clothing management.   Bathing from  edge of bed with Supervision.  Sitting at edge of bed x5-10 mins minutes with Modified [place].  Stand pivot transfers with Modified [place].  Step transfer with Modified [place]  Toilet transfer to toilet with Modified [place].                         Time Tracking:     OT Date of Treatment:    OT Start Time:  3:00  OT Stop Time:  3:45  OT Total Time (min):      Billable Minutes:Self Care/Home Management 15  Therapeutic Activity 15  Therapeutic Exercise 15    OT/[place]: OT          1/17/2023

## 2023-01-17 NOTE — PT/OT/SLP PROGRESS
Physical Therapy Treatment    Patient Name:  Marci Aguilera   MRN:  [id number]    Recommendations:     Discharge Recommendations: home with home health  Discharge Equipment Recommendations: walker, rolling  Barriers to discharge: None    Assessment:     Marci Aguilera is a 72 y.o. female admitted with a medical diagnosis of S/P total right hip arthroplasty.  She presents with the following impairments/functional limitations: weakness, impaired endurance, gait instability, decreased safety awareness, decreased lower extremity function, pain, orthopedic precautions.       Pt tolerated afternoon tx fair with ambulation in room and in hallway using RW CGA with c/o right hip pain with all functional movement. Pt demo step to gait pattern/antalgic with RIGHT LOWER EXTREMITY. Pt reports she can take a shower now and request a shower jose roberto or 3 -in-1 commode to go into shower.     Rehab Prognosis: Good; patient would benefit from acute skilled PT services to address these deficits and reach maximum level of function.    Recent Surgery: * No surgery found *      Plan:     During this hospitalization, patient to be seen BID to address the identified rehab impairments via gait training and progress toward the following goals:    Plan of Care Expires:  02/10/23    Subjective     Chief Complaint: Pt reports her right leg hurts and wanting to lay down after she walks this afternoon. Pt request shower chair so she can take a shower later on.   Patient/Family Comments/goals: To get stronger and more mobile to return home.  Pain/Comfort:  Pain Rating 1: 7/10  Location - Side 1: Right  Location - Orientation 1: lower  Location 1: hip  Pain Addressed 1: Reposition, Distraction      Objective:     Communicated with pt prior to session.  Patient found up in chair  upon PT entry to room.     General Precautions: Standard, fall  Orthopedic Precautions: RLE weight bearing as tolerated  Braces: N/A  Respiratory Status: Room air      Functional Mobility:  Transfers:     Sit to Stand from reclining chair and W/C- Supervision with rolling walker  Gait: Pt received gait training with FWW for 20 ft in room then 65 ft in hallway total of 85 ft with CGA and cueing to narrow BJORN slightly.  Pt exhibits short stance time on R LE and decreased step length with L LE.  Balance: Static standing balance is good-      AM-PAC 6 CLICK MOBILITY  Turning over in bed (including adjusting bedclothes, sheets and blankets)?: 4  Sitting down on and standing up from a chair with arms (e.g., wheelchair, bedside commode, etc.): 4  Moving from lying on back to sitting on the side of the bed?: 4  Moving to and from a bed to a chair (including a wheelchair)?: 4  Need to walk in hospital room?: 3  Climbing 3-5 steps with a railing?: 1  Basic Mobility Total Score: 20       Treatment & Education:  Pt performed sit to stand from reclining chair- Supervision  Ambulation from chair to toilet using RW CGA-SBA  Ambulation in hallway x 65 ft using RW CGA vc on walker management RIGHT LOWER EXTREMITY WBAT  Sit to Supine- Ash[place]    Patient left HOB elevated with RIGHT LOWER EXTREMITY propped with pillow for comfort with call button in reach.    GOALS:   Multidisciplinary Problems       Physical Therapy Goals          Problem: Physical Therapy    Goal Priority Disciplines Outcome Goal Variances Interventions   Physical Therapy Goal     PT, PT/OT Ongoing, Progressing     Description: Goals to be met by: 2 weeks     Patient will increase functional independence with mobility by performin. Supine to sit with Stand-by Assistance  2. Sit to supine with Stand-by Assistance  3. Rolling to Left and Right with Modified [place].  4. Sit to stand transfer with Stand-by Assistance  5. Bed to chair transfer with Stand-by Assistance using Rolling Walker  6. Gait  x 150 feet with Stand-by Assistance using Rolling Walker.     Goals to be met by: 4 weeks     Patient will increase functional  independence with mobility by performin. Supine to sit with Modified [place]  2. Sit to supine with Modified [place]  3. Sit to stand transfer with Modified [place]  4. Bed to chair transfer with Modified [place] using Rolling Walker  5. Gait  x 300 feet with Modified [place] using Rolling Walker.                          Time Tracking:     PT Received On: 23  PT Start Time: 1300     PT Stop Time: 1316  PT Total Time (min): 16 min     Billable Minutes: Gait Training 16    Treatment Type: Treatment  PT/PTA: PTA     PTA Visit Number: 1     2023

## 2023-01-17 NOTE — PLAN OF CARE
[facility] - Medical Surgical Unit - Swing Bed   Interdisciplinary Team Meeting    Patient: Marci Aguilera   Today's Date: 1/17/2023   Estimated D/C Date: 1/31/23        Physician:Stew Spicer D.O. Nurse Practitioner:    Pharmacy:Sesar Topete, Pharm D Unit Director: LYDIA Hart   : Brenda Reilly RN Physical/Occupational Therapy: David Harley OT   Speech Therapy: ST Aurelio    Nursing:  Denzel Heard RN  Respiratory: Jenifer Garciaom, Resp. Dietary:  Pham Norris, Dietary  Other:      Nurse  New Symptoms/Problems: having a lot of pain after hip surgery  Last BM: 1/16/23 Urine: continent Diarrhea: No   Constipated: No Bowel: continent [place]: No   Isolation: No D/C Date:  Date:    O2 Device: Room Air O2 Flow:   SpO2: 97 %   Nutrition: Regular  Speech/Swallowing: No current speech or swallowing issues  Aspiration Precautions: No  Cognition: WNL    Physical Therapy  Physical Therapy/Gait: ambulated 70 ft x 2 with CGA and cueing ELOS: Plan to DC  1/31/23   Transfers: Contact Guard Assistance with RW Range of Motion/Restrictions: WBAT     Occupational Therapy  Eating/Grooming: Modified Independent Toileting: Minimal Assistance   Bathing: Minimal Assistance Dressing (Upper Body): Minimal Assistance   Dressing (Lower Body): Maximum Assistance        Tx Plan/Recommendations reviewed with family and/or patient on (date) 1/17/23.  Additional family Conference/Training:   D/C Plan/Recommendations: Home with HH  ABDIRAHMAN: 1/31/23    Pharmacy  Medication Changes (see MD orders in chart): No  MD:Stew Spicer D.O. Labs Reviewed: Yes New Lab Orders: Yes   MD/NP Signature:

## 2023-01-17 NOTE — NURSING
Pt has been complaining of pain since 2000 tonight and has been given Norco 10mg, Ibuprofen 600 mg, and tylenol 650 mg each at about 2 hours apart during the night. Pt states she thinks the pain is coming from her therapy sessions during the day. At 0200 rounds tonight I found pt in bed resting well with eyes closed. Will continue to monitor.

## 2023-01-18 PROCEDURE — 97110 THERAPEUTIC EXERCISES: CPT | Mod: CQ

## 2023-01-18 PROCEDURE — 63600175 PHARM REV CODE 636 W HCPCS: Performed by: FAMILY MEDICINE

## 2023-01-18 PROCEDURE — 27000944

## 2023-01-18 PROCEDURE — 97530 THERAPEUTIC ACTIVITIES: CPT | Mod: CQ

## 2023-01-18 PROCEDURE — 25000003 PHARM REV CODE 250: Performed by: NURSE PRACTITIONER

## 2023-01-18 PROCEDURE — 97116 GAIT TRAINING THERAPY: CPT | Mod: CQ

## 2023-01-18 PROCEDURE — 97530 THERAPEUTIC ACTIVITIES: CPT

## 2023-01-18 PROCEDURE — 94761 N-INVAS EAR/PLS OXIMETRY MLT: CPT

## 2023-01-18 PROCEDURE — 99900035 HC TECH TIME PER 15 MIN (STAT)

## 2023-01-18 PROCEDURE — 25000003 PHARM REV CODE 250: Performed by: FAMILY MEDICINE

## 2023-01-18 PROCEDURE — 11000004 HC SNF PRIVATE

## 2023-01-18 PROCEDURE — 27000981 HC MATTRESS, ACCUCAIR DAILY RENTAL

## 2023-01-18 PROCEDURE — 97535 SELF CARE MNGMENT TRAINING: CPT

## 2023-01-18 RX ADMIN — ACETAMINOPHEN 325 MG: 325 TABLET ORAL at 08:01

## 2023-01-18 RX ADMIN — OXYCODONE AND ACETAMINOPHEN 2 TABLET: 325; 5 TABLET ORAL at 11:01

## 2023-01-18 RX ADMIN — LEVOFLOXACIN 750 MG: 250 TABLET, FILM COATED ORAL at 08:01

## 2023-01-18 RX ADMIN — FONDAPARINUX SODIUM 2.5 MG: 2.5 INJECTION SUBCUTANEOUS at 08:01

## 2023-01-18 RX ADMIN — MORPHINE SULFATE 10 MG: 10 INJECTION INTRAVENOUS at 12:01

## 2023-01-18 RX ADMIN — OXYCODONE AND ACETAMINOPHEN 2 TABLET: 325; 5 TABLET ORAL at 10:01

## 2023-01-18 RX ADMIN — MUPIROCIN: 20 OINTMENT TOPICAL at 08:01

## 2023-01-18 RX ADMIN — OXYBUTYNIN CHLORIDE 5 MG: 5 TABLET, EXTENDED RELEASE ORAL at 08:01

## 2023-01-18 RX ADMIN — SENNOSIDES AND DOCUSATE SODIUM 1 TABLET: 8.6; 5 TABLET ORAL at 08:01

## 2023-01-18 RX ADMIN — SENNOSIDES AND DOCUSATE SODIUM 1 TABLET: 8.6; 5 TABLET ORAL at 09:01

## 2023-01-18 RX ADMIN — OXYCODONE AND ACETAMINOPHEN 2 TABLET: 325; 5 TABLET ORAL at 05:01

## 2023-01-18 NOTE — PT/OT/SLP PROGRESS
Physical Therapy Treatment    Patient Name:  Marci Aguilera   MRN:  [id number]    Recommendations:     Discharge Recommendations: home with home health  Discharge Equipment Recommendations: walker, rolling  Barriers to discharge: None    Assessment:     Marci Aguilera is a 72 y.o. female admitted with a medical diagnosis of S/P total right hip arthroplasty.  She presents with the following impairments/functional limitations: weakness, impaired endurance, gait instability, decreased safety awareness, decreased lower extremity function, pain, decreased ROM, orthopedic precautions.       Pt tolerated all seated and standing exercises and activity well without c/o severe right hip pain just occass c/o muscle soreness with hip abductors area. Pt reports her pain medication and changed and has improved her pain tolerance since yesterday. Pt is highly motivated to get stronger and improve her functional mobility as well as independence with ADLS.     Rehab Prognosis: Good; patient would benefit from acute skilled PT services to address these deficits and reach maximum level of function.    Recent Surgery: * No surgery found *      Plan:     During this hospitalization, patient to be seen BID to address the identified rehab impairments via gait training, therapeutic activities, therapeutic exercises and progress toward the following goals:    Plan of Care Expires:  02/10/23    Subjective     Chief Complaint: Pt reports her pain medication has changed and she slept better last night. Pt willing to participate this Am.   Patient/Family Comments/goals: To get stronger and more mobile to return home.  Pain/Comfort:  Pain Rating 1: 0/10  Location - Side 1: Right  Location - Orientation 1: lower  Location 1: hip  Pain Addressed 1: Pre-medicate for activity      Objective:     Communicated with pt prior to session.  Patient found HOB elevated  upon PT entry to room.     General Precautions: Standard, fall  Orthopedic  Precautions: RLE weight bearing as tolerated  Braces: N/A  Respiratory Status: Room air     Functional Mobility:  Bed mobility:  Supine <> Sit: [place]  Transfers:     Sit to Stand from EOB and W/C- Supervision   Gait: 220 ft using RW CGA-SBA vc on proper gait pattern   Balance: Static standing balance is good-      AM-PAC 6 CLICK MOBILITY  Turning over in bed (including adjusting bedclothes, sheets and blankets)?: 4  Sitting down on and standing up from a chair with arms (e.g., wheelchair, bedside commode, etc.): 4  Moving from lying on back to sitting on the side of the bed?: 4  Moving to and from a bed to a chair (including a wheelchair)?: 4  Need to walk in hospital room?: 3  Climbing 3-5 steps with a railing?: 1  Basic Mobility Total Score: 20       Treatment & Education:  Supine to Sit- [place]  Sit to stand- Supervision  Ambulation from room to therapy gym x 110 ft CGA, SBA  Nustep x 10 min  Seated LONG ARC QUAD, marches, hip abduction red tband, ball squeezes( hip add) , HAMSTRING curl red tband all 2 x 10  Sit to stands from W/C to RW x 10  Ambulation from gym back to room x 110 ft using RW vc on proper gait pattern due to decrease in hip and knee flexion during swing through  Sit to Supine-[place]     Patient left HOB elevated with RIGHT LOWER EXTREMITY propped with pillow for comfort with call button in reach.    GOALS:   Multidisciplinary Problems       Physical Therapy Goals          Problem: Physical Therapy    Goal Priority Disciplines Outcome Goal Variances Interventions   Physical Therapy Goal     PT, PT/OT Ongoing, Progressing     Description: Goals to be met by: 2 weeks     Patient will increase functional independence with mobility by performin. Supine to sit with Stand-by Assistance  2. Sit to supine with Stand-by Assistance  3. Rolling to Left and Right with Modified [place].  4. Sit to stand transfer with Stand-by Assistance  5. Bed to chair transfer with Stand-by Assistance using Rolling  Walker  6. Gait  x 150 feet with Stand-by Assistance using Rolling Walker.     Goals to be met by: 4 weeks     Patient will increase functional independence with mobility by performin. Supine to sit with Modified [place]  2. Sit to supine with Modified [place]  3. Sit to stand transfer with Modified [place]  4. Bed to chair transfer with Modified [place] using Rolling Walker  5. Gait  x 300 feet with Modified [place] using Rolling Walker.                          Time Tracking:     PT Received On: 23  PT Start Time: 859     PT Stop Time: 952  PT Total Time (min): 53 min     Billable Minutes: Gait Training 16, Therapeutic Exercise 25, and Therapeutic Activity 11    Treatment Type: Treatment  PT/PTA: PTA     PTA Visit Number: 2     2023

## 2023-01-18 NOTE — PT/OT/SLP PROGRESS
Physical Therapy Treatment    Patient Name:  Marci Aguilera   MRN:  [id number]    Recommendations:     Discharge Recommendations: home with home health  Discharge Equipment Recommendations: walker, rolling  Barriers to discharge: None    Assessment:     Marci Aguilera is a 72 y.o. female admitted with a medical diagnosis of S/P total right hip arthroplasty.  She presents with the following impairments/functional limitations: weakness, impaired endurance, gait instability, decreased lower extremity function, decreased safety awareness, pain, orthopedic precautions.       Pt tolerated gait training this afternoon fair to well using RW SBA antalgic gait pattern vc on [place]. No c/o increased pain throughout tx.     Rehab Prognosis: Good; patient would benefit from acute skilled PT services to address these deficits and reach maximum level of function.    Recent Surgery: * No surgery found *      Plan:     During this hospitalization, patient to be seen BID to address the identified rehab impairments via gait training and progress toward the following goals:    Plan of Care Expires:  02/10/23    Subjective     Chief Complaint: Pt fatigued after just finished with her OT session but willing to walk back with PTA to her room for afternoon session.    Patient/Family Comments/goals: To get stronger and more mobile to return home.  Pain/Comfort:  Pain Rating 1: 2/10  Location - Side 1: Right  Location - Orientation 1: lower  Location 1: hip  Pain Addressed 1: Distraction      Objective:     Communicated with pt prior to session.  Patient found seated in W/C with OT  upon PT entry to room.     General Precautions: Standard, fall  Orthopedic Precautions: RLE weight bearing as tolerated  Braces: N/A  Respiratory Status: Room air     Functional Mobility:  Gait: 110 ft using RW CGA-SBA vc on proper gait pattern   Balance: Static standing balance is good-      AM-PAC 6 CLICK MOBILITY  Turning over in bed (including  adjusting bedclothes, sheets and blankets)?: 4  Sitting down on and standing up from a chair with arms (e.g., wheelchair, bedside commode, etc.): 4  Moving from lying on back to sitting on the side of the bed?: 4  Moving to and from a bed to a chair (including a wheelchair)?: 4  Need to walk in hospital room?: 3  Climbing 3-5 steps with a railing?: 1  Basic Mobility Total Score: 20       Treatment & Education:  Ambulation from therapy gym to pt room x 110ft using RW SBA    Patient left seated in reclining chair with call button in reach.    GOALS:   Multidisciplinary Problems       Physical Therapy Goals          Problem: Physical Therapy    Goal Priority Disciplines Outcome Goal Variances Interventions   Physical Therapy Goal     PT, PT/OT Ongoing, Progressing     Description: Goals to be met by: 2 weeks     Patient will increase functional independence with mobility by performin. Supine to sit with Stand-by Assistance  2. Sit to supine with Stand-by Assistance  3. Rolling to Left and Right with Modified [place].  4. Sit to stand transfer with Stand-by Assistance  5. Bed to chair transfer with Stand-by Assistance using Rolling Walker  6. Gait  x 150 feet with Stand-by Assistance using Rolling Walker.     Goals to be met by: 4 weeks     Patient will increase functional independence with mobility by performin. Supine to sit with Modified [place]  2. Sit to supine with Modified [place]  3. Sit to stand transfer with Modified [place]  4. Bed to chair transfer with Modified [place] using Rolling Walker  5. Gait  x 300 feet with Modified [place] using Rolling Walker.                          Time Tracking:     PT Received On: 23  PT Start Time: 1325     PT Stop Time: 1335  PT Total Time (min): 10 min     Billable Minutes: Gait Training 10    Treatment Type: Treatment  PT/PTA: PTA     PTA Visit Number: 3     2023

## 2023-01-18 NOTE — NURSING
Pt noted to have R hip incision open to air. Slight redness noted. No drainage. Staples dry & intact. Pt refuses to have dressing put on incision site.

## 2023-01-18 NOTE — NURSING
Nurse asked pt if a dressing could be placed to R hip. Pt refused. Nurse informed pt of the risk for infection if site is left open to air. Pt verbalizes understanding, but pt still refuses.

## 2023-01-18 NOTE — PT/OT/SLP PROGRESS
Occupational Therapy   Treatment    Name: Marci Aguilera  MRN: [id number]  Admitting Diagnosis:  S/P total right hip arthroplasty       Recommendations:     Discharge Recommendations: home with home health, outpatient OT  Discharge Equipment Recommendations:  walker, rolling  Barriers to discharge:       Assessment:     Marci Aguilera is a 72 y.o. female with a medical diagnosis of S/P total right hip arthroplasty.  She presents with decreased balance, limited ROM and decreased strength. Performance deficits affecting function are impaired endurance, weakness, impaired self care skills, impaired functional mobility, impaired balance, decreased safety awareness, decreased lower extremity function. Pt had better participation and more energy on today. Pt reports that she slept better on last night.    Rehab Prognosis:  Good and Fair; patient would benefit from acute skilled OT services to address these deficits and reach maximum level of function.       Plan:     Patient to be seen 5 x/week to address the above listed problems via self-care/home management, therapeutic activities, therapeutic exercises, neuromuscular re-education  Plan of Care Expires: 02/10/23  Plan of Care Reviewed with: patient    Subjective     Pain/Comfort:       Objective:     Communicated with: Pt prior to session.  Patient found supine   upon OT entry to room.    General Precautions: Standard, fall    Orthopedic Precautions:RUE weight bearing as tolerated  Braces:    Respiratory Status: Room air     Occupational Performance:     Bed Mobility:    Patient completed Supine to Sit with minimum assistance     Functional Mobility/Transfers:  Patient completed Sit <> Stand Transfer with contact guard assistance  with  rolling walker   Functional Mobility: Pt completed functional ambulation to the therapy gym using the RW for balance and safety.    Activities of Daily Living:    Sit to stand t/f  with OT providing cues on hand/foot placement       [facility] 6 Click ADL:      Treatment & Education:    Therapeutic exercise:  While sitting in the w/c, the pt completed 2 sets of 20 reps of chest press and bicep curls 2 sets of 20 reps with 5# DB to increase BUE strength for ADLs and IADLS    Therapeutic activity:    While standing the pt reached to place cards on a Velcro board to increase BUE ROM, functional reaching, standing endurance and dynamic balance to increase independence with ADLs    Patient left supine with all lines intact    GOALS:   Multidisciplinary Problems       Occupational Therapy Goals          Problem: Occupational Therapy    Goal Priority Disciplines Outcome Interventions   Occupational Therapy Goal     OT, PT/OT Ongoing, Progressing    Description: Goals to be met by: 02/13/23     Patient will increase functional independence with ADLs by performing:    UE Dressing with [place].  LE Dressing with Modified [place] using a/e as needed  Grooming while standing with Modified [place].  Toileting from toilet with Modified [place] for hygiene and clothing management.   Bathing from  edge of bed with Supervision.  Sitting at edge of bed x5-10 mins minutes with Modified [place].  Stand pivot transfers with Modified [place].  Step transfer with Modified [place]  Toilet transfer to toilet with Modified [place].                         Time Tracking:     OT Date of Treatment:    OT Start Time:  1:00  OT Stop Time:  1:23  OT Total Time (min):      Billable Minutes:Self Care/Home Management 8    Therapeutic Exercise 15    OT/KE[place]: OT          1/18/2023

## 2023-01-19 PROCEDURE — 97535 SELF CARE MNGMENT TRAINING: CPT

## 2023-01-19 PROCEDURE — 25000003 PHARM REV CODE 250: Performed by: FAMILY MEDICINE

## 2023-01-19 PROCEDURE — 25000003 PHARM REV CODE 250: Performed by: NURSE PRACTITIONER

## 2023-01-19 PROCEDURE — 99900035 HC TECH TIME PER 15 MIN (STAT)

## 2023-01-19 PROCEDURE — 94761 N-INVAS EAR/PLS OXIMETRY MLT: CPT

## 2023-01-19 PROCEDURE — 97530 THERAPEUTIC ACTIVITIES: CPT

## 2023-01-19 PROCEDURE — 25000003 PHARM REV CODE 250: Performed by: PHYSICIAN ASSISTANT

## 2023-01-19 PROCEDURE — 97116 GAIT TRAINING THERAPY: CPT | Mod: CQ

## 2023-01-19 PROCEDURE — 63600175 PHARM REV CODE 636 W HCPCS: Performed by: NURSE PRACTITIONER

## 2023-01-19 PROCEDURE — 97110 THERAPEUTIC EXERCISES: CPT | Mod: CQ

## 2023-01-19 PROCEDURE — 11000004 HC SNF PRIVATE

## 2023-01-19 PROCEDURE — 87428 SARSCOV & INF VIR A&B AG IA: CPT | Performed by: NURSE PRACTITIONER

## 2023-01-19 PROCEDURE — 97110 THERAPEUTIC EXERCISES: CPT

## 2023-01-19 RX ORDER — DOCUSATE SODIUM 100 MG/1
100 CAPSULE, LIQUID FILLED ORAL 2 TIMES DAILY PRN
Status: DISCONTINUED | OUTPATIENT
Start: 2023-01-19 | End: 2023-01-22 | Stop reason: HOSPADM

## 2023-01-19 RX ORDER — ENOXAPARIN SODIUM 100 MG/ML
40 INJECTION SUBCUTANEOUS EVERY 24 HOURS
Status: DISCONTINUED | OUTPATIENT
Start: 2023-01-19 | End: 2023-01-22 | Stop reason: HOSPADM

## 2023-01-19 RX ORDER — POLYETHYLENE GLYCOL 3350 17 G/17G
17 POWDER, FOR SOLUTION ORAL DAILY
Status: DISCONTINUED | OUTPATIENT
Start: 2023-01-19 | End: 2023-01-22 | Stop reason: HOSPADM

## 2023-01-19 RX ADMIN — IBUPROFEN 600 MG: 600 TABLET, FILM COATED ORAL at 10:01

## 2023-01-19 RX ADMIN — ENOXAPARIN SODIUM 40 MG: 40 INJECTION SUBCUTANEOUS at 05:01

## 2023-01-19 RX ADMIN — OXYCODONE AND ACETAMINOPHEN 2 TABLET: 325; 5 TABLET ORAL at 09:01

## 2023-01-19 RX ADMIN — ASPIRIN 81 MG: 81 TABLET, COATED ORAL at 08:01

## 2023-01-19 RX ADMIN — DOCUSATE SODIUM 100 MG: 100 CAPSULE, LIQUID FILLED ORAL at 10:01

## 2023-01-19 RX ADMIN — LEVOFLOXACIN 750 MG: 250 TABLET, FILM COATED ORAL at 08:01

## 2023-01-19 RX ADMIN — POLYETHYLENE GLYCOL 3350 17 G: 17 POWDER, FOR SOLUTION ORAL at 08:01

## 2023-01-19 RX ADMIN — OXYBUTYNIN CHLORIDE 5 MG: 5 TABLET, EXTENDED RELEASE ORAL at 08:01

## 2023-01-19 RX ADMIN — DOCUSATE SODIUM 100 MG: 100 CAPSULE, LIQUID FILLED ORAL at 08:01

## 2023-01-19 RX ADMIN — OXYCODONE AND ACETAMINOPHEN 2 TABLET: 325; 5 TABLET ORAL at 11:01

## 2023-01-19 RX ADMIN — ONDANSETRON 4 MG: 4 TABLET, ORALLY DISINTEGRATING ORAL at 10:01

## 2023-01-19 NOTE — PT/OT/SLP PROGRESS
Physical Therapy Treatment    Patient Name:  Marci Aguilera   MRN:  [id number]    Recommendations:     Discharge Recommendations: home with home health  Discharge Equipment Recommendations: walker, rolling  Barriers to discharge: None    Assessment:     Marci Aguilera is a 72 y.o. female admitted with a medical diagnosis of S/P total right hip arthroplasty.  She presents with the following impairments/functional limitations: weakness, impaired endurance, gait instability, decreased safety awareness, decreased lower extremity function, pain, decreased ROM, orthopedic precautions.       Pt tolerated tx fairly well with seated exercises to improve right jip strength and endurance as well as improving gait today using RW x 250 ft with no seated rest breaks SBA vc on upright posture and right knee flexion during swing phase.     Rehab Prognosis: Good; patient would benefit from acute skilled PT services to address these deficits and reach maximum level of function.    Recent Surgery: * No surgery found *      Plan:     During this hospitalization, patient to be seen BID to address the identified rehab impairments via gait training, therapeutic exercises and progress toward the following goals:    Plan of Care Expires:  02/10/23    Subjective     Chief Complaint: Pt reports she feels better and received 2 pain meds around lunch. Pt willing to participate.   Patient/Family Comments/goals: To get stronger and more mobile to return home.  Pain/Comfort:  Pain Rating 1: 0/10  Location - Side 1: Right  Location - Orientation 1: lower  Location 1: hip  Pain Addressed 1: Pre-medicate for activity      Objective:     Communicated with pt prior to session.  Patient found seated in reclining chair  upon PT entry to room.     General Precautions: Standard, fall  Orthopedic Precautions: RLE weight bearing as tolerated  Braces: N/A  Respiratory Status: Room air     Functional Mobility:  Gait: 250 ft using RW CGA-SBA vc on  proper gait pattern   Balance: Static standing balance is good-      AM-PAC 6 CLICK MOBILITY          Treatment & Education:  Seated LAQS and marches 2 x 10  Seated Hamstring curls red tband 2 x 10  Standing marches 2 x 10  Sit to stand 2 x 5 from reclining chair to RW   Sit to stand from chair to RW before ambulation in hallway SBA-Supervision     Patient left seated in reclining chair with call button in reach.    GOALS:   Multidisciplinary Problems       Physical Therapy Goals          Problem: Physical Therapy    Goal Priority Disciplines Outcome Goal Variances Interventions   Physical Therapy Goal     PT, PT/OT Ongoing, Progressing     Description: Goals to be met by: 2 weeks     Patient will increase functional independence with mobility by performin. Supine to sit with Stand-by Assistance  2. Sit to supine with Stand-by Assistance  3. Rolling to Left and Right with Modified [address].  4. Sit to stand transfer with Stand-by Assistance  5. Bed to chair transfer with Stand-by Assistance using Rolling Walker  6. Gait  x 150 feet with Stand-by Assistance using Rolling Walker.     Goals to be met by: 4 weeks     Patient will increase functional independence with mobility by performin. Supine to sit with Modified [address]  2. Sit to supine with Modified [address]  3. Sit to stand transfer with Modified [address]  4. Bed to chair transfer with Modified [address] using Rolling Walker  5. Gait  x 300 feet with Modified [address] using Rolling Walker.                          Time Tracking:     PT Received On: 23  PT Start Time: 1320     PT Stop Time: 1358  PT Total Time (min): 38 min     Billable Minutes: Gait Training 15 and Therapeutic Exercise 23    Treatment Type: Treatment  PT/PTA: PTA     PTA Visit Number: 4     2023

## 2023-01-19 NOTE — NURSING
Nurses Note -- 4 Eyes      1/18/2023  18:58 PM      Skin assessed during: Q Shift Change      [x] No Pressure Injuries Present    []Prevention Measures Documented      [] Yes- Altered Skin Integrity Present or Discovered   [] LDA Added if Not in Epic (Describe Wound)   [] New Altered Skin Integrity was Present on Admit and Documented in LDA   [] Wound Image Taken    Wound Care Consulted? No    Attending Nurse:  CA BERNARD RN     Second RN/Staff Member:  SUMA Calle

## 2023-01-19 NOTE — PLAN OF CARE
"Pt. Pain medication has been adjusted and pt. Is having better management of her pain to Rt. Hip. Pt. Continues on Levaquin for her Pneumonia. Pt. Stated that "this surgery has been worse than the last one was". Pt. Does need to be encouraged to be up and out of bed. Will cont. To follow.  "

## 2023-01-19 NOTE — PT/OT/SLP PROGRESS
Occupational Therapy   Treatment    Name: Marci Aguilera  MRN: [id number]  Admitting Diagnosis:  S/P total right hip arthroplasty       Recommendations:     Discharge Recommendations: home with home health, outpatient OT  Discharge Equipment Recommendations:  walker, rolling  Barriers to discharge:       Assessment:     Marci Aguilera is a 72 y.o. female with a medical diagnosis of S/P total right hip arthroplasty.  She presents with decreased balance, limited ROM and decreased strength. Performance deficits affecting function are impaired endurance, weakness, impaired self care skills, impaired functional mobility, impaired balance, decreased safety awareness, decreased lower extremity function. Pt participated well with skilled OT and was given rest breaks as needed. Pt completed LE dressing with Mod I. Pt to continue skilled OT at this time.     Rehab Prognosis:  Good and Fair; patient would benefit from acute skilled OT services to address these deficits and reach maximum level of function.       Plan:     Patient to be seen 5 x/week to address the above listed problems via self-care/home management, therapeutic activities, therapeutic exercises, neuromuscular re-education  Plan of Care Expires: 02/10/23  Plan of Care Reviewed with: patient    Subjective     Pain/Comfort:       Objective:     Communicated with: Pt prior to session.  Patient found supine   upon OT entry to room.    General Precautions: Standard, fall    Orthopedic Precautions:RUE weight bearing as tolerated  Braces:    Respiratory Status: Room air     Occupational Performance:     Bed Mobility:    Patient completed Supine to Sit with minimum assistance     Functional Mobility/Transfers:  Patient completed Sit <> Stand Transfer with contact guard assistance  with  rolling walker   Functional Mobility: Pt completed functional ambulation to the therapy gym using the RW for balance and safety.    Activities of Daily Living:    Sit to stand  t/f  with Mod I OT providing cues on hand/foot placement    LE dressing: with Mod I using the Reacher    AMPAC 6 Click ADL:      Treatment & Education:    Therapeutic exercise:  While sitting in the w/c, the pt completed 2 sets of 20 reps of bicep curls and bicep curls 2 sets of 20 reps with 5# DB to increase BUE strength for ADLs and IADLS    Therapeutic activity:    While sitting in w/c, Pt [id number] mins on Sci Fit to increase endurance, BUE strength and activity tolerance for ADL performance    While standing the pt tossed a large therapy ball back and forth with OT to improve gross motor coordination, core strength, UB strength and endurance for ADL performance.    Patient left supine with all lines intact    GOALS:   Multidisciplinary Problems       Occupational Therapy Goals          Problem: Occupational Therapy    Goal Priority Disciplines Outcome Interventions   Occupational Therapy Goal     OT, PT/OT Ongoing, Progressing    Description: Goals to be met by: 02/13/23     Patient will increase functional independence with ADLs by performing:    UE Dressing with [place].  LE Dressing with Modified [place] using a/e as needed  Grooming while standing with Modified [place].  Toileting from toilet with Modified [place] for hygiene and clothing management.   Bathing from  edge of bed with Supervision.  Sitting at edge of bed x5-10 mins minutes with Modified [place].  Stand pivot transfers with Modified [place].  Step transfer with Modified [place]  Toilet transfer to toilet with Modified [place].                         Time Tracking:     OT Date of Treatment:    OT Start Time:  2:00  OT Stop Time:  2:45  OT Total Time (min):      Billable Minutes:Self Care/Home Management 15  Therapeutic activity: 15  Therapeutic Exercise 15    OT/KEATON: OT          1/18/2023

## 2023-01-19 NOTE — PROGRESS NOTES
"Skilled PT attempted for pt's AM treatment but pt refused stating, "I just got sick and do not feel well. Can we try after dinner. My bowels haven't been right since I had my surgery." Will check back on pt after lunch for her PM treatment.  "

## 2023-01-20 LAB
ANION GAP SERPL CALCULATED.3IONS-SCNC: 10 MMOL/L (ref 7–16)
ANISOCYTOSIS BLD QL SMEAR: ABNORMAL
BASOPHILS # BLD AUTO: 0.04 K/UL (ref 0–0.2)
BASOPHILS NFR BLD AUTO: 0.5 % (ref 0–1)
BUN SERPL-MCNC: 13 MG/DL (ref 7–18)
BUN/CREAT SERPL: 16 (ref 6–20)
CALCIUM SERPL-MCNC: 8.5 MG/DL (ref 8.5–10.1)
CHLORIDE SERPL-SCNC: 103 MMOL/L (ref 98–107)
CO2 SERPL-SCNC: 28 MMOL/L (ref 21–32)
CREAT SERPL-MCNC: 0.8 MG/DL (ref 0.55–1.02)
DIFFERENTIAL METHOD BLD: ABNORMAL
EGFR (NO RACE VARIABLE) (RUSH/TITUS): 78 ML/MIN/1.73M²
EOSINOPHIL # BLD AUTO: 0.56 K/UL (ref 0–0.5)
EOSINOPHIL NFR BLD AUTO: 7.2 % (ref 1–4)
EOSINOPHIL NFR BLD MANUAL: 12 % (ref 1–4)
ERYTHROCYTE [DISTWIDTH] IN BLOOD BY AUTOMATED COUNT: 13.7 % (ref 11.5–14.5)
GLUCOSE SERPL-MCNC: 111 MG/DL (ref 74–106)
HCT VFR BLD AUTO: 32.8 % (ref 38–47)
HGB BLD-MCNC: 10.5 G/DL (ref 12–16)
HYPOCHROMIA BLD QL SMEAR: ABNORMAL
LYMPHOCYTES # BLD AUTO: 1.95 K/UL (ref 1–4.8)
LYMPHOCYTES NFR BLD AUTO: 25 % (ref 27–41)
LYMPHOCYTES NFR BLD MANUAL: 31 % (ref 27–41)
MCH RBC QN AUTO: 28.7 PG (ref 27–31)
MCHC RBC AUTO-ENTMCNC: 32 G/DL (ref 32–36)
MCV RBC AUTO: 89.6 FL (ref 80–96)
MONOCYTES # BLD AUTO: 0.77 K/UL (ref 0–0.8)
MONOCYTES NFR BLD AUTO: 9.9 % (ref 2–6)
MONOCYTES NFR BLD MANUAL: 5 % (ref 2–6)
MPC BLD CALC-MCNC: 9.2 FL (ref 9.4–12.4)
NEUTROPHILS # BLD AUTO: 4.48 K/UL (ref 1.8–7.7)
NEUTROPHILS NFR BLD AUTO: 57.4 % (ref 53–65)
NEUTS SEG NFR BLD MANUAL: 52 % (ref 50–62)
NRBC BLD MANUAL-RTO: ABNORMAL %
PLATELET # BLD AUTO: 376 K/UL (ref 150–400)
PLATELET MORPHOLOGY: ABNORMAL
POIKILOCYTOSIS BLD QL SMEAR: ABNORMAL
POTASSIUM SERPL-SCNC: 4.2 MMOL/L (ref 3.5–5.1)
RBC # BLD AUTO: 3.66 M/UL (ref 4.2–5.4)
SODIUM SERPL-SCNC: 137 MMOL/L (ref 136–145)
WBC # BLD AUTO: 7.8 K/UL (ref 4.5–11)

## 2023-01-20 PROCEDURE — 11000004 HC SNF PRIVATE

## 2023-01-20 PROCEDURE — 25000003 PHARM REV CODE 250: Performed by: FAMILY MEDICINE

## 2023-01-20 PROCEDURE — 36415 COLL VENOUS BLD VENIPUNCTURE: CPT | Performed by: NURSE PRACTITIONER

## 2023-01-20 PROCEDURE — 80048 BASIC METABOLIC PNL TOTAL CA: CPT | Performed by: NURSE PRACTITIONER

## 2023-01-20 PROCEDURE — 94761 N-INVAS EAR/PLS OXIMETRY MLT: CPT

## 2023-01-20 PROCEDURE — 25000003 PHARM REV CODE 250: Performed by: NURSE PRACTITIONER

## 2023-01-20 PROCEDURE — 97530 THERAPEUTIC ACTIVITIES: CPT

## 2023-01-20 PROCEDURE — 85025 COMPLETE CBC W/AUTO DIFF WBC: CPT | Performed by: NURSE PRACTITIONER

## 2023-01-20 PROCEDURE — 63600175 PHARM REV CODE 636 W HCPCS: Performed by: NURSE PRACTITIONER

## 2023-01-20 PROCEDURE — 99307 PR NURSING FAC CARE, SUBSEQ, IMPROVING: ICD-10-PCS | Mod: ,,, | Performed by: FAMILY MEDICINE

## 2023-01-20 PROCEDURE — 97110 THERAPEUTIC EXERCISES: CPT

## 2023-01-20 PROCEDURE — 97535 SELF CARE MNGMENT TRAINING: CPT

## 2023-01-20 PROCEDURE — 99307 SBSQ NF CARE SF MDM 10: CPT | Mod: ,,, | Performed by: FAMILY MEDICINE

## 2023-01-20 PROCEDURE — 99900035 HC TECH TIME PER 15 MIN (STAT)

## 2023-01-20 PROCEDURE — 97116 GAIT TRAINING THERAPY: CPT | Mod: CQ

## 2023-01-20 RX ADMIN — ENOXAPARIN SODIUM 40 MG: 40 INJECTION SUBCUTANEOUS at 04:01

## 2023-01-20 RX ADMIN — LEVOFLOXACIN 750 MG: 250 TABLET, FILM COATED ORAL at 08:01

## 2023-01-20 RX ADMIN — PANTOPRAZOLE SODIUM 40 MG: 40 TABLET, DELAYED RELEASE ORAL at 08:01

## 2023-01-20 RX ADMIN — OXYCODONE AND ACETAMINOPHEN 1 TABLET: 325; 5 TABLET ORAL at 10:01

## 2023-01-20 RX ADMIN — OXYCODONE AND ACETAMINOPHEN 2 TABLET: 325; 5 TABLET ORAL at 04:01

## 2023-01-20 RX ADMIN — GUAIFENESIN AND DEXTROMETHORPHAN 10 ML: 100; 10 SYRUP ORAL at 07:01

## 2023-01-20 RX ADMIN — POLYETHYLENE GLYCOL 3350 17 G: 17 POWDER, FOR SOLUTION ORAL at 08:01

## 2023-01-20 RX ADMIN — ONDANSETRON 4 MG: 4 TABLET, ORALLY DISINTEGRATING ORAL at 04:01

## 2023-01-20 RX ADMIN — ASPIRIN 81 MG: 81 TABLET, COATED ORAL at 08:01

## 2023-01-20 RX ADMIN — OXYBUTYNIN CHLORIDE 5 MG: 5 TABLET, EXTENDED RELEASE ORAL at 08:01

## 2023-01-20 NOTE — PT/OT/SLP PROGRESS
Occupational Therapy   Treatment    Name: Marci Aguilera  MRN: [id number]  Admitting Diagnosis:  S/P total right hip arthroplasty       Recommendations:     Discharge Recommendations: home with home health, outpatient OT  Discharge Equipment Recommendations:  walker, rolling  Barriers to discharge:       Assessment:     Marci Aguilera is a 72 y.o. female with a medical diagnosis of S/P total right hip arthroplasty.  She presents with decreased balance, limited ROM and decreased strength. Performance deficits affecting function are impaired endurance, weakness, impaired self care skills, impaired functional mobility, impaired balance, decreased safety awareness, decreased lower extremity function. Pt participated well with skilled OT and was given rest breaks as needed. Pt completed t/f with Mod I. Pt is improving with mobility and strength and plans to d/c home on Tuesday week.    Rehab Prognosis:  Good and Fair; patient would benefit from acute skilled OT services to address these deficits and reach maximum level of function.       Plan:     Patient to be seen 5 x/week to address the above listed problems via self-care/home management, therapeutic activities, therapeutic exercises, neuromuscular re-education  Plan of Care Expires: 02/10/23  Plan of Care Reviewed with: patient    Subjective     Pain/Comfort:       Objective:     Communicated with: Pt prior to session.  Patient found supine   upon OT entry to room.    General Precautions: Standard, fall    Orthopedic Precautions:RUE weight bearing as tolerated  Braces:    Respiratory Status: Room air     Occupational Performance:     Bed Mobility:    Patient completed Supine to Sit with minimum assistance     Functional Mobility/Transfers:  Patient completed Sit <> Stand Transfer with Mod i  with  rolling walker   Functional Mobility: Pt completed functional ambulation to the therapy gym using the RW for balance and safety.    Activities of Daily  Living:    Sit to stand t/f  with Mod I x 3 reps    AMPA 6 Click ADL:      Treatment & Education:    Therapeutic exercise:  While sitting in the w/c, the pt completed 2 sets of 20 reps of bicep curls and shoulder raises 2 sets of 15 reps with 5# DB to increase BUE strength for ADLs and IADLS    Therapeutic activity:    While sitting in w/c, Pt completed 10 mins on UB ergometer to increase endurance, BUE strength and activity tolerance for ADL performance    While standing the pt tossed a large therapy ball back and forth with OT to improve gross motor coordination, standing balance, core strength, UB strength and endurance for ADL performance.    Patient left supine with all lines intact    GOALS:   Multidisciplinary Problems       Occupational Therapy Goals          Problem: Occupational Therapy    Goal Priority Disciplines Outcome Interventions   Occupational Therapy Goal     OT, PT/OT Ongoing, Progressing    Description: Goals to be met by: 02/13/23     Patient will increase functional independence with ADLs by performing:    UE Dressing with [place].  LE Dressing with Modified [place] using a/e as needed  Grooming while standing with Modified [place].  Toileting from toilet with Modified [place] for hygiene and clothing management.   Bathing from  edge of bed with Supervision.  Sitting at edge of bed x5-10 mins minutes with Modified [place].  Stand pivot transfers with Modified [place].  Step transfer with Modified [place]  Toilet transfer to toilet with Modified [place].                         Time Tracking:     OT Date of Treatment:    OT Start Time:  2:40  OT Stop Time:  3:20  OT Total Time (min):      Billable Minutes:Self Care/Home Management 10  Therapeutic activity: 15  Therapeutic Exercise 15    OT/KEATON: OT          1/20/2023

## 2023-01-20 NOTE — PT/OT/SLP PROGRESS
Physical Therapy Treatment    Patient Name:  Marci Aguilera   MRN:  [id number]    Recommendations:     Discharge Recommendations: home with home health  Discharge Equipment Recommendations: walker, rolling  Barriers to discharge: None    Assessment:     Marci Aguilera is a 72 y.o. female admitted with a medical diagnosis of S/P total right hip arthroplasty.  She presents with the following impairments/functional limitations: weakness, impaired endurance, gait instability, decreased safety awareness, pain, decreased ROM, orthopedic precautions.      Pt still presents with SOB during functional mobility in standing in today's afternoon session. Pt performed steps using bilateral hand railings SBA in preparation of pt going home for couple hours tomorrow and she has 6 steps to enter home.     Rehab Prognosis: Good; patient would benefit from acute skilled PT services to address these deficits and reach maximum level of function.    Recent Surgery: * No surgery found *      Plan:     During this hospitalization, patient to be seen BID to address the identified rehab impairments via gait training, therapeutic activities and progress toward the following goals:    Plan of Care Expires:  02/10/23    Subjective     Chief Complaint: No new complaints.   Patient/Family Comments/goals: To get stronger and be able to return home safely.  Pain/Comfort:  Pain Rating 1: 0/10  Location - Side 1: Right  Location - Orientation 1: lower  Location 1: hip  Pain Addressed 1: Pre-medicate for activity      Objective:     Communicated with pt prior to session.  Patient found seated in chair with   upon PT entry to room.     General Precautions: Standard, fall  Orthopedic Precautions: RLE weight bearing as tolerated  Braces: N/A  Respiratory Status: Room air     Functional Mobility:  Transfers:     Sit to Stand:  Supervision with rolling walker  Gait: Pt received gait training with FWW for 210 feet WBAT R LE with SBA.  Pt required  cueing to correct forward flexed, head down posture.    Balance: Static standing balance is good      AM-PAC 6 CLICK MOBILITY  Turning over in bed (including adjusting bedclothes, sheets and blankets)?: 4  Sitting down on and standing up from a chair with arms (e.g., wheelchair, bedside commode, etc.): 4  Moving from lying on back to sitting on the side of the bed?: 4  Moving to and from a bed to a chair (including a wheelchair)?: 4  Need to walk in hospital room?: 4  Climbing 3-5 steps with a railing?: 4  Basic Mobility Total Score: 24       Treatment & Education:  Nustep x 10 min  Ascending / descending 4 steps using bilateral hand railings SBA without difficulty  Ambulation pt room <> therapy gym using RW SBA     Patient left seated in chair with call button in reach.    GOALS:   Multidisciplinary Problems       Physical Therapy Goals          Problem: Physical Therapy    Goal Priority Disciplines Outcome Goal Variances Interventions   Physical Therapy Goal     PT, PT/OT Ongoing, Progressing     Description: Goals to be met by: 2 weeks     Patient will increase functional independence with mobility by performin. Supine to sit with Stand-by Assistance  2. Sit to supine with Stand-by Assistance  3. Rolling to Left and Right with Modified [place].  4. Sit to stand transfer with Stand-by Assistance  5. Bed to chair transfer with Stand-by Assistance using Rolling Walker  6. Gait  x 150 feet with Stand-by Assistance using Rolling Walker.     Goals to be met by: 4 weeks     Patient will increase functional independence with mobility by performin. Supine to sit with Modified [place]  2. Sit to supine with Modified [place]  3. Sit to stand transfer with Modified [place]  4. Bed to chair transfer with Modified [place] using Rolling Walker  5. Gait  x 300 feet with Modified [place] using Rolling Walker.                          Time Tracking:     PT Received On: 23  PT Start  Time: 1315     PT Stop Time: 1338  PT Total Time (min): 23 min     Billable Minutes: Gait Training 11 and Therapeutic Activity 12    Treatment Type: Treatment  PT/PTA: PTA     PTA Visit Number: 1     01/20/2023

## 2023-01-20 NOTE — H&P
[facility] - [facility] Surgical Unit  Hospital Medicine  History & Physical    Patient Name: Marci Aguilera  MRN: [id number]  Patient Class: IP- Swing  Admission Date: 1/13/2023  Attending Physician: Stew Spicer DO   Primary Care Provider: ANKUSH Hunter         Patient information was obtained from ER records.     Subjective:     Principal Problem:S/P total right hip arthroplasty    Chief Complaint: No chief complaint on file.       HPI: 71 y/o WF with a PMH of CAD with stent, dyslipidemia, HX of DVT and PE, obesity, GERD, and ashtma present to [facility] for admission to [facility]. Pt is S/P right hip arthopasty on 1/10/23 by Dr. Germán Barron. Pt has had the left hip replaced in the past. Has been working with PT while at [facility] and will be admitted for PT/OT and pain control.       No new subjective & objective note has been filed under this hospital service since the last note was generated.    Assessment/Plan:  For assessment and plan the patient assessment is to monitor her to make sure she does not developed DVT.  She has had these before in the past.  She is to work with PT and OT the patient be admitted for the right total hip arthroplasty on 01/10/2023 by Dr. Dunlap.  And she also has a small pneumonia     * S/P total right hip arthroplasty    Performed by Dr. Germán Barron on 1/10/23  PT/OT  Daily dressing changes  DC staples and place steri strips on 1/24/23  F/U with Dr. Germán Barron in 4 weeks    History of DVT (deep vein thrombosis)    VTE prophylaxis:  CIRO/SCD's  Early ambulation  Arixtra x 5 days, then switch to enteric coated ASA daily.       Gastroesophageal reflux disease    Continue home meds    CAD (coronary artery disease)    Continue home meds  Cardiac diet  Weekly labs  VS Q shift    VTE Risk Mitigation (From admission, onward)         Ordered     enoxaparin injection 40 mg  Daily         01/19/23 0916     IP VTE HIGH RISK PATIENT  Once         01/13/23 1241     Place CIRO hose  Until  discontinued         01/13/23 1241     Place sequential compression device  Until discontinued         01/13/23 1241                   Stew Spicer DO  Department of Hospital Medicine   [facility]

## 2023-01-20 NOTE — PT/OT/SLP PROGRESS
"Physical Therapy Treatment    Patient Name:  Marci Aguilera   MRN:  [id number]    Recommendations:     Discharge Recommendations: home with home health  Discharge Equipment Recommendations: walker, rolling  Barriers to discharge: None    Assessment:     Marci Aguilera is a 72 y.o. female admitted with a medical diagnosis of S/P total right hip arthroplasty.  She presents with the following impairments/functional limitations: weakness, impaired endurance, impaired functional mobility, gait instability, impaired balance, decreased lower extremity function, decreased safety awareness, pain.  Pt tolerated treatment well but requires frequent rest breaks due to SOB.  Pt progressing toward functional goals.    Rehab Prognosis: Good; patient would benefit from acute skilled PT services to address these deficits and reach maximum level of function.    Recent Surgery: * No surgery found *      Plan:     During this hospitalization, patient to be seen BID to address the identified rehab impairments via gait training, therapeutic exercises and progress toward the following goals:    Plan of Care Expires:  02/10/23    Subjective     Chief Complaint: Pt reports "My hip is more sore today. Maybe I should have asked for pain med this morning."  Pt agreed to treatment in Rehab gym.  Patient/Family Comments/goals: To get stronger and be able to return home safely.  Pain/Comfort:  Pain Rating 1: 4/10  Location - Side 1: Right  Location 1: hip  Pain Addressed 1: Pre-medicate for activity, Reposition, Distraction  Pain Rating Post-Intervention 1: 2/10      Objective:     Communicated with pt prior to session.  Patient found supine with   upon PT entry to room.     General Precautions: Standard, fall  Orthopedic Precautions: RLE weight bearing as tolerated, RLE posterior precautions  Braces: N/A  Respiratory Status: Room air     Functional Mobility:  Bed Mobility:     Rolling Left:  modified independence  Scooting: " supervision  Supine to Sit: supervision  Transfers:     Sit to Stand:  contact guard assistance with rolling walker  Gait: Pt received gait training with FWW for 125 feet WBAT R LE with SBA.  Pt required cueing to correct forward flexed, head down posture.    Balance: Static standing balance is good      AM-PAC 6 CLICK MOBILITY  Turning over in bed (including adjusting bedclothes, sheets and blankets)?: 4  Sitting down on and standing up from a chair with arms (e.g., wheelchair, bedside commode, etc.): 3  Moving from lying on back to sitting on the side of the bed?: 4  Moving to and from a bed to a chair (including a wheelchair)?: 3  Need to walk in hospital room?: 3  Climbing 3-5 steps with a railing?: 1  Basic Mobility Total Score: 18       Treatment & Education:  Pt performed Nustep x 10 mins for LE flexibility and strengthening.  Pt performed B LE seated Hip add squeezes, AP, LAQ and marching with 2# cw on L LE only, hip abd and HS curls with red tband all x 2 sets 15-20 reps.  Pt performed standing R LE ex x 2 sets 10 reps each consisting of Toe/heel raises, hip flexion, hip abd, hip ext using rw as support.  Pt engaged in sit to stand x 5 reps with SBA and standing tolerance activity x 2-3 mins x 3 reps.  Pt also performed static standing balance activity including weightshift, postural awareness, and SLS on L LE.    Patient left supine with call button in reach..    GOALS:   Multidisciplinary Problems       Physical Therapy Goals          Problem: Physical Therapy    Goal Priority Disciplines Outcome Goal Variances Interventions   Physical Therapy Goal     PT, PT/OT Ongoing, Progressing     Description: Goals to be met by: 2 weeks     Patient will increase functional independence with mobility by performin. Supine to sit with Stand-by Assistance  2. Sit to supine with Stand-by Assistance  3. Rolling to Left and Right with Modified [place].  4. Sit to stand transfer with Stand-by Assistance  5. Bed  to chair transfer with Stand-by Assistance using Rolling Walker  6. Gait  x 150 feet with Stand-by Assistance using Rolling Walker.     Goals to be met by: 4 weeks     Patient will increase functional independence with mobility by performin. Supine to sit with Modified [place]  2. Sit to supine with Modified [place]  3. Sit to stand transfer with Modified [place]  4. Bed to chair transfer with Modified [place] using Rolling Walker  5. Gait  x 300 feet with Modified [place] using Rolling Walker.                          Time Tracking:     PT Received On: 23  PT Start Time: 908     PT Stop Time: 100  PT Total Time (min): 53 min     Billable Minutes: Gait Training 15, Therapeutic Activity 10, and Therapeutic Exercise 28    Treatment Type: Treatment  PT/PTA: PT     PTA Visit Number: 0     2023

## 2023-01-20 NOTE — PROGRESS NOTES
[facility]  Hospital Medicine  Progress Note    Patient Name: Marci Aguilera  MRN: [id number]  Patient Class: IP- Swing   Admission Date: 1/13/2023  Length of Stay: 7 days  Attending Physician: Stew Spicer DO  Primary Care Provider: ANKUSH Hunter        Subjective:     Principal Problem:S/P total right hip arthroplasty        HPI:  73 y/o WF with a PMH of CAD with stent, dyslipidemia, HX of DVT and PE, obesity, GERD, and ashtma present to [facility] for admission to [facility]. Pt is S/P right hip arthopasty on 1/10/23 by Dr. Germán Barron. Pt has had the left hip replaced in the past. Has been working with PT while at [facility] and will be admitted for PT/OT and pain control.       Overview/Hospital Course:  01/14/2023:  Left-sided pneumonia noted on chest x-ray from today.  Patient reports having a cough following surgery.  Afebrile.  Vital signs stable.  Room air sat 92 to 97%.  Respirations even and nonlabored.  Lung sounds diminished at the left base, otherwise clear.  Levaquin 750 mg p.o. daily x7 days added.  Patient already has incentive spirometer and albuterol. Discussed with Dr. Robbins       01/20/2023.  Patient doing better with physical therapy.  She wants to try to stay till Monday to do physical therapy.--      No new subjective & objective note has been filed under this hospital service since the last note was generated.      Assessment/Plan:      * S/P total right hip arthroplasty    Performed by Dr. Germán Barron on 1/10/23  PT/OT  Daily dressing changes  DC staples and place steri strips on 1/24/23  F/U with Dr. Germán Barron in 4 weeks    History of DVT (deep vein thrombosis)    VTE prophylaxis:  CIRO/SCD's  Early ambulation  Arixtra x 5 days, then switch to enteric coated ASA daily.       Gastroesophageal reflux disease    Continue home meds    CAD (coronary artery disease)    Continue home meds  Cardiac diet  Weekly labs  VS Q shift    VTE Risk Mitigation (From  admission, onward)         Ordered     enoxaparin injection 40 mg  Daily         01/19/23 0916     IP VTE HIGH RISK PATIENT  Once         01/13/23 1241     Place CIRO hose  Until discontinued         01/13/23 1241     Place sequential compression device  Until discontinued         01/13/23 1241                Discharge Planning   ABDIRAHMAN:      Code Status: Full Code   Is the patient medically ready for discharge?:     Reason for patient still in hospital (select all that apply): Treatment  Discharge Plan A: Home with family, Home Health                  Stew Spicer DO  Department of Hospital Medicine   [facility]

## 2023-01-20 NOTE — NURSING
Nurses Note -- 4 Eyes      1/19/2023   7:09 PM      Skin assessed during: Q Shift Change      [x] No Pressure Injuries Present    []Prevention Measures Documented      [] Yes- Altered Skin Integrity Present or Discovered   [] LDA Added if Not in Epic (Describe Wound)   [] New Altered Skin Integrity was Present on Admit and Documented in LDA   [] Wound Image Taken    Wound Care Consulted? No    Attending Nurse:  CA BERNARD RN     Second RN/Staff Member:  SUMA Calle    Incision to right hip with staples intact open to air with no drainage or bleeding noted. Declines to have dressing applied to area.

## 2023-01-21 PROCEDURE — 11000004 HC SNF PRIVATE

## 2023-01-21 PROCEDURE — 94761 N-INVAS EAR/PLS OXIMETRY MLT: CPT

## 2023-01-21 PROCEDURE — 99900035 HC TECH TIME PER 15 MIN (STAT)

## 2023-01-21 PROCEDURE — 25000003 PHARM REV CODE 250: Performed by: NURSE PRACTITIONER

## 2023-01-21 PROCEDURE — 27000944

## 2023-01-21 PROCEDURE — 25000003 PHARM REV CODE 250: Performed by: FAMILY MEDICINE

## 2023-01-21 PROCEDURE — 27000981 HC MATTRESS, ACCUCAIR DAILY RENTAL

## 2023-01-21 RX ADMIN — OXYCODONE AND ACETAMINOPHEN 2 TABLET: 325; 5 TABLET ORAL at 08:01

## 2023-01-21 RX ADMIN — ASPIRIN 81 MG: 81 TABLET, COATED ORAL at 08:01

## 2023-01-21 RX ADMIN — OXYBUTYNIN CHLORIDE 5 MG: 5 TABLET, EXTENDED RELEASE ORAL at 08:01

## 2023-01-21 RX ADMIN — GUAIFENESIN AND DEXTROMETHORPHAN 10 ML: 100; 10 SYRUP ORAL at 11:01

## 2023-01-21 RX ADMIN — PANTOPRAZOLE SODIUM 40 MG: 40 TABLET, DELAYED RELEASE ORAL at 08:01

## 2023-01-21 RX ADMIN — OXYCODONE AND ACETAMINOPHEN 2 TABLET: 325; 5 TABLET ORAL at 03:01

## 2023-01-21 NOTE — NURSING
Order for leave of absent given per Dr. Spicer. Patient out on leave to home for the day, instructed to return before midnight. Left facility via walker with .

## 2023-01-22 VITALS
DIASTOLIC BLOOD PRESSURE: 51 MMHG | OXYGEN SATURATION: 97 % | RESPIRATION RATE: 18 BRPM | BODY MASS INDEX: 32.28 KG/M2 | SYSTOLIC BLOOD PRESSURE: 106 MMHG | HEART RATE: 63 BPM | TEMPERATURE: 98 F | HEIGHT: 68 IN | WEIGHT: 213 LBS

## 2023-01-22 PROCEDURE — 94761 N-INVAS EAR/PLS OXIMETRY MLT: CPT

## 2023-01-22 PROCEDURE — 27000981 HC MATTRESS, ACCUCAIR DAILY RENTAL

## 2023-01-22 PROCEDURE — 99900035 HC TECH TIME PER 15 MIN (STAT)

## 2023-01-22 PROCEDURE — 25000003 PHARM REV CODE 250: Performed by: FAMILY MEDICINE

## 2023-01-22 PROCEDURE — 27000944

## 2023-01-22 PROCEDURE — 25000003 PHARM REV CODE 250: Performed by: NURSE PRACTITIONER

## 2023-01-22 RX ADMIN — OXYBUTYNIN CHLORIDE 5 MG: 5 TABLET, EXTENDED RELEASE ORAL at 08:01

## 2023-01-22 RX ADMIN — PANTOPRAZOLE SODIUM 40 MG: 40 TABLET, DELAYED RELEASE ORAL at 08:01

## 2023-01-22 RX ADMIN — ASPIRIN 81 MG: 81 TABLET, COATED ORAL at 08:01

## 2023-01-22 NOTE — NURSING
Patient returned from leave.  walked patient to the room. Patient using walker to ambulate. No distressed noted.

## 2023-01-22 NOTE — PLAN OF CARE
Problem: Adult Inpatient Plan of Care  Goal: Plan of Care Review  Outcome: Met  Goal: Patient-Specific Goal (Individualized)  Outcome: Met  Goal: Absence of Hospital-Acquired Illness or Injury  Outcome: Met  Goal: Optimal Comfort and Wellbeing  Outcome: Met  Goal: Readiness for Transition of Care  Outcome: Met     Problem: Fall Injury Risk  Goal: Absence of Fall and Fall-Related Injury  Outcome: Met     Problem: Fatigue  Goal: Improved Activity Tolerance  Outcome: Met     Problem: Pain Acute  Goal: Acceptable Pain Control and Functional Ability  Outcome: Met     Problem: Skin Injury Risk Increased  Goal: Skin Health and Integrity  Outcome: Met

## 2023-01-22 NOTE — NURSING
Discharge paperwork given to patient. Patient voiced understanding. Prescription for Norco 7.5mg given to patient.

## 2023-01-22 NOTE — DISCHARGE SUMMARY
[facility] - Medical Surgical Unit  Hospital Medicine  Discharge Summary      Patient Name: Marci Aguilera  MRN: [id number]  OMAR: [id number]  Patient Class: IP- Swing  Admission Date: 1/13/2023  Hospital Length of Stay: 9 days  Discharge Date and Time:  01/22/2023 9:14 AM  Attending Physician: Stew Spicer DO   Discharging Provider: Stew Spicer DO  Primary Care Provider: ANKUSH Hunter    Primary Care Team: Networked reference to record PCT     HPI:   71 y/o WF with a PMH of CAD with stent, dyslipidemia, HX of DVT and PE, obesity, GERD, and ashtma present to [facility] for admission to [facility]. Pt is S/P right hip arthopasty on 1/10/23 by Dr. Germán Barron. Pt has had the left hip replaced in the past. Has been working with PT while at [facility] and will be admitted for PT/OT and pain control.       * No surgery found *      Hospital Course:   01/14/2023:  Left-sided pneumonia noted on chest x-ray from today.  Patient reports having a cough following surgery.  Afebrile.  Vital signs stable.  Room air sat 92 to 97%.  Respirations even and nonlabored.  Lung sounds diminished at the left base, otherwise clear.  Levaquin 750 mg p.o. daily x7 days added.  Patient already has incentive spirometer and albuterol. Discussed with Dr. Robbins       01/20/2023.  Patient doing better with physical therapy.  She wants to try to stay till Monday to do physical therapy.--    01/22/2023 discharge summary the patient has done well with physical therapy and occupational therapy.  She still has staples in but will be following up for her staple removal she will be discharged home today she went home yesterday on leave of absence to find out that she will be able to handle it she will be following outpatient physical therapy.       Goals of Care Treatment Preferences:  Code Status: Full Code      Consults:   Consults (From admission, onward)        Status Ordering Provider     Inpatient consult to Registered  Dietitian/Nutritionist  Once        Provider:  (Not yet assigned)    Completed LUCY FERNANDO          No new Assessment & Plan notes have been filed under this hospital service since the last note was generated.  Service: Hospital Medicine    Final Active Diagnoses:    Diagnosis Date Noted POA    PRINCIPAL PROBLEM:  S/P total right hip arthroplasty [Z96.641] 01/13/2023 Not Applicable    History of DVT (deep vein thrombosis) [Z86.718] 01/13/2023 Not Applicable    Asthma [J45.909] 01/13/2023 Unknown    Gastroesophageal reflux disease [K21.9]  Yes    CAD (coronary artery disease) [I25.10] 05/26/2021 Yes      Problems Resolved During this Admission:       Discharged Condition: good    Disposition:     Follow Up:   Follow-up Information     Davey Dunlap MD Follow up on 2/14/2023.    Specialty: Orthopedic Surgery  Why: at 12:30pm for post op. surgery appointment  Contact information:  [address] [address]  [phone]                       Patient Instructions:   No discharge procedures on file.    Significant Diagnostic Studies: Labs:   BMP: No results for input(s): GLU, NA, K, CL, CO2, BUN, CREATININE, CALCIUM, MG in the last 48 hours., CMP No results for input(s): NA, K, CL, CO2, GLU, BUN, CREATININE, CALCIUM, PROT, ALBUMIN, BILITOT, ALKPHOS, AST, ALT, ANIONGAP, ESTGFRAFRICA, EGFRNONAA in the last 48 hours., CBC No results for input(s): WBC, HGB, HCT, PLT in the last 48 hours., INR   Lab Results   Component Value Date    INR 0.93 11/25/2021    INR 0.99 06/01/2021    INR 1.0 10/02/2020   , Lipid Panel No results found for: CHOL, HDL, LDLCALC, TRIG, CHOLHDL and Troponin No results for input(s): TROPONINI in the last 168 hours.    Pending Diagnostic Studies:     None         Medications:  Reconciled Home Medications:      Medication List      CHANGE how you take these medications    ARNUITY ELLIPTA 200 mcg/actuation inhaler  Generic drug: fluticasone  furoate  Inhale 1 puff into the lungs once daily.  What changed:   · when to take this  · reasons to take this        CONTINUE taking these medications    albuterol 90 mcg/actuation inhaler  Commonly known as: VENTOLIN HFA  Inhale 2 puffs into the lungs every 6 (six) hours as needed for Wheezing. Rescue     aspirin 81 MG EC tablet  Commonly known as: ECOTRIN  Take 81 mg by mouth once daily.     budesonide-formoterol 160-4.5 mcg 160-4.5 mcg/actuation Hfaa  Commonly known as: SYMBICORT  Inhale 2 puffs into the lungs every 12 (twelve) hours. Controller     estradioL 0.01 % (0.1 mg/gram) vaginal cream  Commonly known as: ESTRACE  Place 1 g vaginally twice a week.     oxybutynin 5 MG Tr24  Commonly known as: DITROPAN-XL  Take 1 tablet (5 mg total) by mouth once daily.     pantoprazole 40 MG tablet  Commonly known as: PROTONIX  Take 1 tablet (40 mg total) by mouth once daily.     vitamin D 1000 units Tab  Commonly known as: VITAMIN D3  Take 1,000 Units by mouth once daily.            Indwelling Lines/Drains at time of discharge:   Lines/Drains/Airways     None                 Time spent on the discharge of patient: 45 minutes         Stew Spicer DO  Department of Hospital Medicine  [facility] - Medical Surgical Unit

## 2023-01-23 DIAGNOSIS — Z96.641 S/P TOTAL RIGHT HIP ARTHROPLASTY: Primary | ICD-10-CM

## 2023-01-23 NOTE — PLAN OF CARE
[facility] - [facility]  Discharge Final Note    Primary Care Provider: ANKUSH Hunter    Expected Discharge Date: 1/22/2023    Final Discharge Note (most recent)       Final Note - 01/23/23 0911          Final Note    Assessment Type Final Discharge Note     Anticipated Discharge Disposition Home or Self Care     What phone number can be called within the next 1-3 days to see how you are doing after discharge? [phone]     Hospital Resources/Appts/Education Provided Provided patient/caregiver with written discharge plan information;Appointments scheduled and added to AVS        Post-Acute Status    Post-Acute Authorization Other   [facility]    Coverage Medicare     Patient choice form signed by patient/caregiver List with quality metrics by geographic area provided;List from CMS Compare;List from System Post-Acute Care     Discharge Delays None known at this time                 Pt. Was d/c home over weekend after having gone out on pass and returning to hospital and feeling that she was ready to d/c the next day. Pt. Will f/u with Dr. Germán Canas As scheduled. Has been referred to [facility] to cont. With outpatient therapy. No DME needs were identified.    Important Message from Medicare             Contact Info       Davey Dunlap MD   Specialty: Orthopedic Surgery    [address] [place] [address] Orthopedic Clinic  [facility] [address]   Phone: [phone]       Next Steps: Follow up on 2/14/2023    Instructions: at 12:30pm for post op. surgery appointment

## 2023-03-26 PROCEDURE — 88305 TISSUE EXAM BY PATHOLOGIST: CPT | Mod: 26,,, | Performed by: PATHOLOGY

## 2023-03-26 PROCEDURE — 88305 PATHOLOGY, DERMATOLOGY: ICD-10-PCS | Mod: 26,,, | Performed by: PATHOLOGY

## 2023-03-26 PROCEDURE — 88305 TISSUE EXAM BY PATHOLOGIST: CPT | Mod: TC,SUR | Performed by: DERMATOLOGY

## 2023-03-27 ENCOUNTER — LAB REQUISITION (OUTPATIENT)
Dept: LAB | Facility: HOSPITAL | Age: 73
End: 2023-03-27
Attending: DERMATOLOGY
Payer: MEDICARE

## 2023-03-27 DIAGNOSIS — D49.2 NEOPLASM OF UNSPECIFIED BEHAVIOR OF BONE, SOFT TISSUE, AND SKIN: ICD-10-CM

## 2023-03-28 LAB
ESTROGEN SERPL-MCNC: NORMAL PG/ML
INSULIN SERPL-ACNC: NORMAL U[IU]/ML
LAB AP GROSS DESCRIPTION: NORMAL
LAB AP LABORATORY NOTES: NORMAL
LAB AP SPEC A DDX: NORMAL
LAB AP SPEC A MORPHOLOGY: NORMAL
LAB AP SPEC A PROCEDURE: NORMAL
T3RU NFR SERPL: NORMAL %

## 2023-08-16 ENCOUNTER — OFFICE VISIT (OUTPATIENT)
Dept: FAMILY MEDICINE | Facility: CLINIC | Age: 73
End: 2023-08-16
Payer: MEDICARE

## 2023-08-16 VITALS
HEART RATE: 71 BPM | BODY MASS INDEX: 33.95 KG/M2 | SYSTOLIC BLOOD PRESSURE: 144 MMHG | TEMPERATURE: 98 F | HEIGHT: 68 IN | RESPIRATION RATE: 18 BRPM | OXYGEN SATURATION: 94 % | DIASTOLIC BLOOD PRESSURE: 83 MMHG | WEIGHT: 224 LBS

## 2023-08-16 DIAGNOSIS — R52 BODY ACHES: Primary | ICD-10-CM

## 2023-08-16 DIAGNOSIS — R50.9 FEVER, UNSPECIFIED FEVER CAUSE: ICD-10-CM

## 2023-08-16 LAB
BILIRUB SERPL-MCNC: NORMAL MG/DL
BLOOD URINE, POC: NORMAL
COLOR, POC UA: YELLOW
CTP QC/QA: YES
CTP QC/QA: YES
FLUAV AG NPH QL: NEGATIVE
FLUBV AG NPH QL: NEGATIVE
GLUCOSE UR QL STRIP: NORMAL
KETONES UR QL STRIP: NORMAL
LEUKOCYTE ESTERASE URINE, POC: NORMAL
NITRITE, POC UA: NORMAL
PH, POC UA: 5.5
PROTEIN, POC: NORMAL
SARS-COV-2 AG RESP QL IA.RAPID: NEGATIVE
SPECIFIC GRAVITY, POC UA: 1.02
UROBILINOGEN, POC UA: 0.2

## 2023-08-16 PROCEDURE — 99213 OFFICE O/P EST LOW 20 MIN: CPT | Mod: ,,, | Performed by: NURSE PRACTITIONER

## 2023-08-16 PROCEDURE — 99213 PR OFFICE/OUTPT VISIT, EST, LEVL III, 20-29 MIN: ICD-10-PCS | Mod: ,,, | Performed by: NURSE PRACTITIONER

## 2023-08-16 PROCEDURE — 81003 URINALYSIS AUTO W/O SCOPE: CPT | Mod: RHCUB | Performed by: NURSE PRACTITIONER

## 2023-08-16 PROCEDURE — 87426 SARSCOV CORONAVIRUS AG IA: CPT | Mod: RHCUB | Performed by: NURSE PRACTITIONER

## 2023-08-16 PROCEDURE — 87804 INFLUENZA ASSAY W/OPTIC: CPT | Mod: RHCUB | Performed by: NURSE PRACTITIONER

## 2023-08-16 NOTE — PROGRESS NOTES
Clinic Note    Marci Aguilera is a 72 y.o. female     Chief Complaint:   Chief Complaint   Patient presents with    Fever     Started yesterday. Fever was 101 at 5 am.     Generalized Body Aches    Chills    Cough    Cystitis     Patient concerned about bladder infection     Nausea        Subjective:    Patient complains of fever, body aches, and headaches. States began running fever at 5am this morning with 101 temp. States she had chills last night. Denies cough, sore throat, or runny nose. Denies dysuria, frequency, or urgency. Patient states she is unsure why she has fever. Denies abdominal pain, n/v, diarrhea.     Fever   Associated symptoms include headaches and nausea. Pertinent negatives include no abdominal pain, chest pain, congestion, coughing, diarrhea, sore throat, urinary pain or vomiting.   Cough  Associated symptoms include chills, a fever, headaches and myalgias. Pertinent negatives include no chest pain, rhinorrhea, sore throat or shortness of breath.   Nausea  Associated symptoms include chills, a fever, headaches, myalgias and nausea. Pertinent negatives include no abdominal pain, chest pain, congestion, coughing, sore throat or vomiting.        Allergies:   Review of patient's allergies indicates:   Allergen Reactions    Statins-hmg-coa reductase inhibitors Other (See Comments)     Refuses to take due to causing muscle aches and joint pain    Chlorpheniramine     Codeine     Diphtheria toxin preparations     Dristan allergy Other (See Comments)     hallucinations    Oxymetazoline     Pheniramine     Phenylephrine     Pseudoephedrine     Tetanus vaccines and toxoid Other (See Comments)     Arm pain and swelling    Penicillins Rash        Past Medical History:  Past Medical History:   Diagnosis Date    Acute superficial gastritis without hemorrhage 8/15/2022    Atrophic vaginitis 05/12/2004    Coronary artery disease     Cystitis 02/2004    Deep vein thrombosis     DVT (deep venous  thrombosis)     Esophageal dysphagia 12/27/2021    Esophagitis 12/27/2021    HH (hiatus hernia) 12/27/2021    Pulmonary embolus     after Septic ~ 2019    Thyroid disease     Vitamin D deficiency         Current Medications:    Current Outpatient Medications:     albuterol (VENTOLIN HFA) 90 mcg/actuation inhaler, Inhale 2 puffs into the lungs every 6 (six) hours as needed for Wheezing. Rescue, Disp: 18 g, Rfl: 5    aspirin (ECOTRIN) 81 MG EC tablet, Take 81 mg by mouth once daily., Disp: , Rfl:     oxybutynin (DITROPAN-XL) 5 MG TR24, Take 1 tablet (5 mg total) by mouth once daily., Disp: 30 tablet, Rfl: 11    pantoprazole (PROTONIX) 40 MG tablet, Take 1 tablet (40 mg total) by mouth once daily., Disp: 30 tablet, Rfl: 11    budesonide-formoterol 160-4.5 mcg (SYMBICORT) 160-4.5 mcg/actuation HFAA, Inhale 2 puffs into the lungs every 12 (twelve) hours. Controller (Patient not taking: No sig reported), Disp: 10.2 g, Rfl: 5    estradioL (ESTRACE) 0.01 % (0.1 mg/gram) vaginal cream, Place 1 g vaginally twice a week. (Patient not taking: Reported on 8/16/2023), Disp: 42.5 g, Rfl: 2    fluticasone furoate (ARNUITY ELLIPTA) 200 mcg/actuation inhaler, Inhale 1 puff into the lungs once daily., Disp: 30 each, Rfl: 5    vitamin D (VITAMIN D3) 1000 units Tab, Take 1,000 Units by mouth once daily., Disp: , Rfl:        Review of Systems   Constitutional:  Positive for chills and fever.   HENT:  Negative for nasal congestion, rhinorrhea and sore throat.    Respiratory:  Negative for cough and shortness of breath.    Cardiovascular:  Negative for chest pain.   Gastrointestinal:  Positive for nausea. Negative for abdominal pain, constipation, diarrhea and vomiting.   Genitourinary:  Negative for dysuria, flank pain, frequency and urgency.   Musculoskeletal:  Positive for myalgias.   Neurological:  Positive for headaches.          Objective:    BP (!) 144/83 (BP Location: Left arm, Patient Position: Sitting, BP Method: Medium  "(Automatic))   Pulse 71   Temp 98.3 °F (36.8 °C) (Oral)   Resp 18   Ht 5' 8" (1.727 m)   Wt 101.6 kg (224 lb)   SpO2 (!) 94%   BMI 34.06 kg/m²      Physical Exam  Constitutional:       Appearance: She is obese.   HENT:      Nose: No congestion or rhinorrhea.      Mouth/Throat:      Pharynx: No oropharyngeal exudate or posterior oropharyngeal erythema.   Eyes:      Extraocular Movements: Extraocular movements intact.   Cardiovascular:      Rate and Rhythm: Normal rate and regular rhythm.      Pulses: Normal pulses.      Heart sounds: Normal heart sounds.   Pulmonary:      Effort: Pulmonary effort is normal.      Breath sounds: Normal breath sounds.   Abdominal:      Palpations: Abdomen is soft.      Tenderness: There is no abdominal tenderness.   Musculoskeletal:      Cervical back: Neck supple.   Lymphadenopathy:      Cervical: No cervical adenopathy.   Neurological:      Mental Status: She is alert and oriented to person, place, and time.          Assessment and Plan:    1. Body aches    2. Fever, unspecified fever cause         Body aches  -likely viral illness  -f/u if symptoms persist or worsen    Fever, unspecified fever cause  -     POCT Influenza A/B Rapid Antigen  -     SARS Coronavirus 2 Antigen, POCT  -     POCT URINALYSIS W/O SCOPE  -poct negative  -alternate tylenol and ibuprofen prn fever/aches    Results for orders placed or performed in visit on 08/16/23   POCT Influenza A/B Rapid Antigen   Result Value Ref Range    Rapid Influenza A Ag Negative Negative    Rapid Influenza B Ag Negative Negative     Acceptable Yes      Results for orders placed or performed in visit on 08/16/23   SARS Coronavirus 2 Antigen, POCT   Result Value Ref Range    SARS Coronavirus 2 Antigen Negative Negative     Acceptable Yes          There are no Patient Instructions on file for this visit.   Follow up if symptoms worsen or fail to improve.     "

## 2023-08-17 ENCOUNTER — HOSPITAL ENCOUNTER (EMERGENCY)
Facility: HOSPITAL | Age: 73
Discharge: HOME | End: 2023-08-17
Payer: MEDICARE

## 2023-08-17 VITALS
OXYGEN SATURATION: 96 % | WEIGHT: 220 LBS | TEMPERATURE: 100 F | HEART RATE: 82 BPM | DIASTOLIC BLOOD PRESSURE: 81 MMHG | RESPIRATION RATE: 20 BRPM | SYSTOLIC BLOOD PRESSURE: 140 MMHG | HEIGHT: 68 IN | BODY MASS INDEX: 33.34 KG/M2

## 2023-08-17 DIAGNOSIS — R50.9 FEVER, UNSPECIFIED FEVER CAUSE: Primary | ICD-10-CM

## 2023-08-17 DIAGNOSIS — B34.9 VIRAL SYNDROME: ICD-10-CM

## 2023-08-17 LAB
ALBUMIN SERPL BCP-MCNC: 3.5 G/DL (ref 3.5–5)
ALBUMIN/GLOB SERPL: 0.9 {RATIO}
ALP SERPL-CCNC: 77 U/L (ref 55–142)
ALT SERPL W P-5'-P-CCNC: 32 U/L (ref 13–56)
ANION GAP SERPL CALCULATED.3IONS-SCNC: 15 MMOL/L (ref 7–16)
AST SERPL W P-5'-P-CCNC: 31 U/L (ref 15–37)
BASOPHILS # BLD AUTO: 0.02 K/UL (ref 0–0.2)
BASOPHILS NFR BLD AUTO: 0.4 % (ref 0–1)
BILIRUB SERPL-MCNC: 0.3 MG/DL (ref ?–1.2)
BUN SERPL-MCNC: 12 MG/DL (ref 7–18)
BUN/CREAT SERPL: 12 (ref 6–20)
CALCIUM SERPL-MCNC: 8.2 MG/DL (ref 8.5–10.1)
CHLORIDE SERPL-SCNC: 102 MMOL/L (ref 98–107)
CO2 SERPL-SCNC: 25 MMOL/L (ref 21–32)
CREAT SERPL-MCNC: 1 MG/DL (ref 0.55–1.02)
DIFFERENTIAL METHOD BLD: ABNORMAL
EGFR (NO RACE VARIABLE) (RUSH/TITUS): 60 ML/MIN/1.73M2
EOSINOPHIL # BLD AUTO: 0.03 K/UL (ref 0–0.5)
EOSINOPHIL NFR BLD AUTO: 0.6 % (ref 1–4)
ERYTHROCYTE [DISTWIDTH] IN BLOOD BY AUTOMATED COUNT: 14.4 % (ref 11.5–14.5)
FLUAV AG UPPER RESP QL IA.RAPID: NEGATIVE
FLUBV AG UPPER RESP QL IA.RAPID: NEGATIVE
GLOBULIN SER-MCNC: 3.7 G/DL (ref 2–4)
GLUCOSE SERPL-MCNC: 119 MG/DL (ref 74–106)
HCT VFR BLD AUTO: 37.7 % (ref 38–47)
HGB BLD-MCNC: 12.6 G/DL (ref 12–16)
LYMPHOCYTES # BLD AUTO: 0.95 K/UL (ref 1–4.8)
LYMPHOCYTES NFR BLD AUTO: 17.7 % (ref 27–41)
MCH RBC QN AUTO: 29.3 PG (ref 27–31)
MCHC RBC AUTO-ENTMCNC: 33.4 G/DL (ref 32–36)
MCV RBC AUTO: 87.7 FL (ref 80–96)
MONOCYTES # BLD AUTO: 0.72 K/UL (ref 0–0.8)
MONOCYTES NFR BLD AUTO: 13.4 % (ref 2–6)
MPC BLD CALC-MCNC: 10 FL (ref 9.4–12.4)
NEUTROPHILS # BLD AUTO: 3.64 K/UL (ref 1.8–7.7)
NEUTROPHILS NFR BLD AUTO: 67.9 % (ref 53–65)
PLATELET # BLD AUTO: 206 K/UL (ref 150–400)
POTASSIUM SERPL-SCNC: 3.9 MMOL/L (ref 3.5–5.1)
PROT SERPL-MCNC: 7.2 G/DL (ref 6.4–8.2)
RBC # BLD AUTO: 4.3 M/UL (ref 4.2–5.4)
SARS-COV+SARS-COV-2 AG RESP QL IA.RAPID: NEGATIVE
SODIUM SERPL-SCNC: 138 MMOL/L (ref 136–145)
WBC # BLD AUTO: 5.36 K/UL (ref 4.5–11)

## 2023-08-17 PROCEDURE — 87428 SARSCOV & INF VIR A&B AG IA: CPT | Performed by: PHYSICIAN ASSISTANT

## 2023-08-17 PROCEDURE — 80053 COMPREHEN METABOLIC PANEL: CPT | Performed by: PHYSICIAN ASSISTANT

## 2023-08-17 PROCEDURE — 63600175 PHARM REV CODE 636 W HCPCS: Performed by: PHYSICIAN ASSISTANT

## 2023-08-17 PROCEDURE — 96372 THER/PROPH/DIAG INJ SC/IM: CPT | Performed by: PHYSICIAN ASSISTANT

## 2023-08-17 PROCEDURE — 25000003 PHARM REV CODE 250: Performed by: PHYSICIAN ASSISTANT

## 2023-08-17 PROCEDURE — 99284 EMERGENCY DEPT VISIT MOD MDM: CPT

## 2023-08-17 PROCEDURE — 85025 COMPLETE CBC W/AUTO DIFF WBC: CPT | Performed by: PHYSICIAN ASSISTANT

## 2023-08-17 RX ORDER — ONDANSETRON 4 MG/1
4 TABLET, ORALLY DISINTEGRATING ORAL
Status: COMPLETED | OUTPATIENT
Start: 2023-08-17 | End: 2023-08-17

## 2023-08-17 RX ORDER — KETOROLAC TROMETHAMINE 30 MG/ML
30 INJECTION, SOLUTION INTRAMUSCULAR; INTRAVENOUS
Status: COMPLETED | OUTPATIENT
Start: 2023-08-17 | End: 2023-08-17

## 2023-08-17 RX ORDER — ACETAMINOPHEN 500 MG
1000 TABLET ORAL
Status: COMPLETED | OUTPATIENT
Start: 2023-08-17 | End: 2023-08-17

## 2023-08-17 RX ADMIN — ONDANSETRON 4 MG: 4 TABLET, ORALLY DISINTEGRATING ORAL at 09:08

## 2023-08-17 RX ADMIN — KETOROLAC TROMETHAMINE 30 MG: 30 INJECTION, SOLUTION INTRAMUSCULAR; INTRAVENOUS at 09:08

## 2023-08-17 RX ADMIN — ACETAMINOPHEN 1000 MG: 500 TABLET ORAL at 09:08

## 2023-08-18 NOTE — ED NOTES
Instructed patient to increase intake of fluids.  May take tylenol 1000 mg every 4-6 hours as needed for fever/discomfort.  May begin alternating tylenol & 600 mg of Ibuprofen at 0930 on Friday.  Follow-up with pcp in 2-3 days if not improving.  Return to ed for any new or worsening symptoms.  Patient verbalized understanding of all instructions.

## 2023-08-18 NOTE — ED PROVIDER NOTES
Encounter Date: 8/17/2023       History     Chief Complaint   Patient presents with    Chills    Nausea    Fever    Headache     Patient c/o fever, chills, headache, nausea for 2-3 days.  Saw Dr Roche yesterday & tested negative for covid. Last had an analgesic.     Patient is a 72-year-old female with history of fever, nausea, chills, body aches, headache for the past 2 days.    She took Tylenol 6 hours ago, and took Motrin this morning.  Her fever goes down when she takes medication, but then it returns.    Patient has a past medical history positive for cystitis, DVT, vitamin-D deficiency, coronary artery disease, thyroid disease, deep vein thrombosis, esophageal dysphagia, hiatal hernia, pulmonary embolus, gastritis.  Past surgical history is positive for cholecystectomy, total hip arthroplasty, rotator cuff repair, hysterectomy, urethral sling  Patient denies any tobacco, use alcohol occasionally, denies any drug use.          Review of patient's allergies indicates:   Allergen Reactions    Statins-hmg-coa reductase inhibitors Other (See Comments)     Refuses to take due to causing muscle aches and joint pain    Chlorpheniramine     Codeine     Diphtheria toxin preparations     Dristan allergy Other (See Comments)     hallucinations    Oxymetazoline     Pheniramine     Phenylephrine     Pseudoephedrine     Tetanus vaccines and toxoid Other (See Comments)     Arm pain and swelling    Penicillins Rash     Past Medical History:   Diagnosis Date    Acute superficial gastritis without hemorrhage 8/15/2022    Atrophic vaginitis 05/12/2004    Coronary artery disease     Cystitis 02/2004    Deep vein thrombosis     DVT (deep venous thrombosis)     Esophageal dysphagia 12/27/2021    Esophagitis 12/27/2021    HH (hiatus hernia) 12/27/2021    Pulmonary embolus     after Septic ~ 2019    Thyroid disease     Vitamin D deficiency      Past Surgical History:   Procedure Laterality Date    CATARACT EXTRACTION       CHOLECYSTECTOMY      CORONARY STENT PLACEMENT      per  2020    FLUOROSCOPIC URODYNAMIC STUDY  10/28/2019    interpreted on 10/29/2019 by Dr. Rudolph Bocanegra;  bladder pain during filling at 320cc,  urodynamic stress incontinence at 302cc without ISD,  no evidence of OAB symtomatology    HIP REPLACEMENT ARTHROPLASTY      JOINT REPLACEMENT      LEFT HEART CATHETERIZATION N/A 6/7/2021    Procedure: Left heart cath with possible intervention;  Surgeon: Chris Dave MD;  Location: [facility] C[facility] LAB;  Service: Cardiology;  Laterality: N/A;    rotator cuff      TOTAL ABDOMINAL HYSTERECTOMY      Transobtruator Tape Urethral Sling procedure and limited Cystoscopy  01/29/2020    at [facility];  no complications     Family History   Problem Relation Age of Onset    Pancreatic cancer Paternal Grandmother     Heart disease Father     Heart disease Mother     Uterine cancer Sister     Heart disease Sister     Cancer Sister     Stroke Brother      Social History     Tobacco Use    Smoking status: Never    Smokeless tobacco: Never   Substance Use Topics    Alcohol use: Yes     Comment: occassional    Drug use: Not Currently     Types: Flunitrazepam     Review of Systems   Constitutional:  Positive for chills and fever.        Body ache   Gastrointestinal:  Positive for nausea.   Neurological:  Positive for headaches.   All other systems reviewed and are negative.      Physical Exam     Initial Vitals [08/17/23 2047]   BP Pulse Resp Temp SpO2   (!) 151/73 84 (!) 21 99.7 °F (37.6 °C) 97 %      MAP       --         Physical Exam    Nursing note and vitals reviewed.  Constitutional:   Obese   HENT:   Head: Normocephalic and atraumatic.   Mouth/Throat: Oropharynx is clear and moist.   Eyes: EOM are normal.   Neck: Neck supple.   Cardiovascular:  Normal rate and regular rhythm.           Pulmonary/Chest: No respiratory distress.   Musculoskeletal:      Cervical back: Neck supple.      Neurological: She is alert and oriented to person, place, and time.   Skin: Skin is warm.   Psychiatric: She has a normal mood and affect.         Medical Screening Exam   See Full Note    ED Course   Procedures  Labs Reviewed   SARS-COV2 (COVID) W/ FLU ANTIGEN          Imaging Results    None          Medications   ketorolac injection 30 mg (has no administration in time range)   acetaminophen tablet 1,000 mg (has no administration in time range)   ondansetron disintegrating tablet 4 mg (has no administration in time range)     Medical Decision Making:   Initial Assessment:   Patient is a 72-year-old female with history of fever, nausea, chills, body aches, headache for the past 2 days.    She took Tylenol 6 hours ago, and took Motrin this morning.  Her fever goes down when she takes medication, but then it returns.    Patient has a past medical history positive for cystitis, DVT, vitamin-D deficiency, coronary artery disease, thyroid disease, deep vein thrombosis, esophageal dysphagia, hiatal hernia, pulmonary embolus, gastritis.  Past surgical history is positive for cholecystectomy, total hip arthroplasty, rotator cuff repair, hysterectomy, urethral sling  Patient denies any tobacco, use alcohol occasionally, denies any drug use.    Differential Diagnosis:   COVID, flu, headache, nausea  ED Management:  Patient will be given Toradol 30 mg IM, a 1000 mg of Tylenol.    She will given Zofran.    She will get swabbed for COVID, flu                         Clinical Impression:   Final diagnoses:  [R50.9] Fever, unspecified fever cause (Primary)               Dwight Vela PA  08/17/23 1295

## 2023-08-19 ENCOUNTER — HOSPITAL ENCOUNTER (EMERGENCY)
Facility: HOSPITAL | Age: 73
Discharge: HOME | End: 2023-08-20
Payer: MEDICARE

## 2023-08-19 DIAGNOSIS — R11.2 NAUSEA AND VOMITING, UNSPECIFIED VOMITING TYPE: ICD-10-CM

## 2023-08-19 DIAGNOSIS — R50.9 FEVER, UNSPECIFIED FEVER CAUSE: Primary | ICD-10-CM

## 2023-08-19 LAB
ALBUMIN SERPL BCP-MCNC: 3 G/DL (ref 3.5–5)
ALBUMIN/GLOB SERPL: 0.9 {RATIO}
ALP SERPL-CCNC: 68 U/L (ref 55–142)
ALT SERPL W P-5'-P-CCNC: 31 U/L (ref 13–56)
ANION GAP SERPL CALCULATED.3IONS-SCNC: 13 MMOL/L (ref 7–16)
AST SERPL W P-5'-P-CCNC: 26 U/L (ref 15–37)
BACTERIA #/AREA URNS HPF: ABNORMAL /HPF
BASOPHILS # BLD AUTO: 0.02 K/UL (ref 0–0.2)
BASOPHILS NFR BLD AUTO: 0.4 % (ref 0–1)
BILIRUB SERPL-MCNC: 0.3 MG/DL (ref ?–1.2)
BILIRUB UR QL STRIP: NEGATIVE
BUN SERPL-MCNC: 9 MG/DL (ref 7–18)
BUN/CREAT SERPL: 9 (ref 6–20)
CALCIUM SERPL-MCNC: 7.7 MG/DL (ref 8.5–10.1)
CHLORIDE SERPL-SCNC: 101 MMOL/L (ref 98–107)
CLARITY UR: CLEAR
CO2 SERPL-SCNC: 27 MMOL/L (ref 21–32)
COLOR UR: YELLOW
CREAT SERPL-MCNC: 1.02 MG/DL (ref 0.55–1.02)
DIFFERENTIAL METHOD BLD: ABNORMAL
EGFR (NO RACE VARIABLE) (RUSH/TITUS): 59 ML/MIN/1.73M2
EOSINOPHIL # BLD AUTO: 0.01 K/UL (ref 0–0.5)
EOSINOPHIL NFR BLD AUTO: 0.2 % (ref 1–4)
ERYTHROCYTE [DISTWIDTH] IN BLOOD BY AUTOMATED COUNT: 14.4 % (ref 11.5–14.5)
FLUAV AG UPPER RESP QL IA.RAPID: NEGATIVE
FLUBV AG UPPER RESP QL IA.RAPID: NEGATIVE
GLOBULIN SER-MCNC: 3.5 G/DL (ref 2–4)
GLUCOSE SERPL-MCNC: 108 MG/DL (ref 74–106)
GLUCOSE UR STRIP-MCNC: NEGATIVE MG/DL
HCT VFR BLD AUTO: 34.5 % (ref 38–47)
HGB BLD-MCNC: 11.5 G/DL (ref 12–16)
KETONES UR STRIP-SCNC: NEGATIVE MG/DL
LACTATE SERPL-SCNC: 1.6 MMOL/L (ref 0.4–2)
LEUKOCYTE ESTERASE UR QL STRIP: NEGATIVE
LIPASE SERPL-CCNC: 29 U/L (ref 73–393)
LYMPHOCYTES # BLD AUTO: 0.98 K/UL (ref 1–4.8)
LYMPHOCYTES NFR BLD AUTO: 19 % (ref 27–41)
MCH RBC QN AUTO: 29.3 PG (ref 27–31)
MCHC RBC AUTO-ENTMCNC: 33.3 G/DL (ref 32–36)
MCV RBC AUTO: 88 FL (ref 80–96)
MONOCYTES # BLD AUTO: 0.65 K/UL (ref 0–0.8)
MONOCYTES NFR BLD AUTO: 12.6 % (ref 2–6)
MPC BLD CALC-MCNC: 10.5 FL (ref 9.4–12.4)
NEUTROPHILS # BLD AUTO: 3.51 K/UL (ref 1.8–7.7)
NEUTROPHILS NFR BLD AUTO: 67.8 % (ref 53–65)
NITRITE UR QL STRIP: NEGATIVE
PH UR STRIP: 5.5 PH UNITS
PLATELET # BLD AUTO: 181 K/UL (ref 150–400)
POTASSIUM SERPL-SCNC: 3.5 MMOL/L (ref 3.5–5.1)
PROT SERPL-MCNC: 6.5 G/DL (ref 6.4–8.2)
PROT UR QL STRIP: >=300
RBC # BLD AUTO: 3.92 M/UL (ref 4.2–5.4)
RBC # UR STRIP: ABNORMAL /UL
RBC #/AREA URNS HPF: ABNORMAL /HPF
SARS-COV+SARS-COV-2 AG RESP QL IA.RAPID: NEGATIVE
SODIUM SERPL-SCNC: 137 MMOL/L (ref 136–145)
SP GR UR STRIP: 1.02
SQUAMOUS #/AREA URNS LPF: ABNORMAL /LPF
UROBILINOGEN UR STRIP-ACNC: 0.2 MG/DL
WBC # BLD AUTO: 5.17 K/UL (ref 4.5–11)
WBC #/AREA URNS HPF: ABNORMAL /HPF

## 2023-08-19 PROCEDURE — 25000003 PHARM REV CODE 250: Performed by: NURSE PRACTITIONER

## 2023-08-19 PROCEDURE — 83605 ASSAY OF LACTIC ACID: CPT | Performed by: NURSE PRACTITIONER

## 2023-08-19 PROCEDURE — 99284 EMERGENCY DEPT VISIT MOD MDM: CPT | Mod: GF | Performed by: NURSE PRACTITIONER

## 2023-08-19 PROCEDURE — 87040 BLOOD CULTURE FOR BACTERIA: CPT | Mod: 59 | Performed by: NURSE PRACTITIONER

## 2023-08-19 PROCEDURE — 85025 COMPLETE CBC W/AUTO DIFF WBC: CPT | Performed by: NURSE PRACTITIONER

## 2023-08-19 PROCEDURE — 80053 COMPREHEN METABOLIC PANEL: CPT | Performed by: NURSE PRACTITIONER

## 2023-08-19 PROCEDURE — 87428 SARSCOV & INF VIR A&B AG IA: CPT | Performed by: NURSE PRACTITIONER

## 2023-08-19 PROCEDURE — 81001 URINALYSIS AUTO W/SCOPE: CPT | Performed by: NURSE PRACTITIONER

## 2023-08-19 PROCEDURE — 83690 ASSAY OF LIPASE: CPT | Performed by: NURSE PRACTITIONER

## 2023-08-19 PROCEDURE — 99284 EMERGENCY DEPT VISIT MOD MDM: CPT

## 2023-08-19 RX ORDER — IBUPROFEN 200 MG
600 TABLET ORAL
Status: COMPLETED | OUTPATIENT
Start: 2023-08-19 | End: 2023-08-19

## 2023-08-19 RX ADMIN — IBUPROFEN 600 MG: 200 TABLET, FILM COATED ORAL at 10:08

## 2023-08-20 VITALS
HEART RATE: 78 BPM | BODY MASS INDEX: 33.34 KG/M2 | OXYGEN SATURATION: 97 % | TEMPERATURE: 99 F | HEIGHT: 68 IN | RESPIRATION RATE: 18 BRPM | SYSTOLIC BLOOD PRESSURE: 114 MMHG | WEIGHT: 220 LBS | DIASTOLIC BLOOD PRESSURE: 67 MMHG

## 2023-08-20 RX ORDER — ONDANSETRON 4 MG/1
4 TABLET, FILM COATED ORAL EVERY 6 HOURS PRN
Qty: 30 TABLET | Refills: 0 | Status: SHIPPED | OUTPATIENT
Start: 2023-08-20

## 2023-08-20 NOTE — DISCHARGE INSTRUCTIONS
Follow up with primary care provider in 2 days for re-evaluation.  Zofran as needed for nausea/vomiting.  Tylenol 650 mg every 6 hours as needed for fever/pain/discomfort.  Motrin 600 mg every 6 hours as needed for fever/pain/discomfort.  Increase water intake.   Return to emergency department for any new or worsening symptoms.

## 2023-08-20 NOTE — ED NOTES
500cc's NSS hanging, started by  patient care ambulance.  Provider states it is okay to infuse this bag.

## 2023-08-20 NOTE — ED PROVIDER NOTES
Encounter Date: 8/19/2023       History     Chief Complaint   Patient presents with    Fever     Body aches     Marci Aguilera is a 72 y.o. White /female presenting to ED via EMS with fever, body aches, N/V for 5 days. She has been evaluated by PCP on 8/16 and [facility] ED 8/17 for sx with negative COVID/Flu and normal lab. Today she comes in with continued sx and 4 episodes of vomiting. She was given Zofran in route. Her main complaint is fever and body aches. States that she has been taking Motrin and Tylenol as directed. Currently in NAD. VSS at this time.      The history is provided by the patient, the EMS personnel and medical records.     Review of patient's allergies indicates:   Allergen Reactions    Statins-hmg-coa reductase inhibitors Other (See Comments)     Refuses to take due to causing muscle aches and joint pain    Chlorpheniramine     Codeine     Diphtheria toxin preparations     Dristan allergy Other (See Comments)     hallucinations    Oxymetazoline     Pheniramine     Phenylephrine     Pseudoephedrine     Tetanus vaccines and toxoid Other (See Comments)     Arm pain and swelling    Penicillins Rash     Past Medical History:   Diagnosis Date    Acute superficial gastritis without hemorrhage 8/15/2022    Atrophic vaginitis 05/12/2004    Coronary artery disease     Cystitis 02/2004    Deep vein thrombosis     DVT (deep venous thrombosis)     Esophageal dysphagia 12/27/2021    Esophagitis 12/27/2021    HH (hiatus hernia) 12/27/2021    Pulmonary embolus     after Septic ~ 2019    Thyroid disease     Vitamin D deficiency      Past Surgical History:   Procedure Laterality Date    CATARACT EXTRACTION      CHOLECYSTECTOMY      CORONARY STENT PLACEMENT      per  2020    FLUOROSCOPIC URODYNAMIC STUDY  10/28/2019    interpreted on 10/29/2019 by Dr. Rudolph Bocanegra;  bladder pain during filling at 320cc,  urodynamic stress incontinence at 302cc without ISD,  no evidence of OAB symtomatology    HIP  REPLACEMENT ARTHROPLASTY      JOINT REPLACEMENT      LEFT HEART CATHETERIZATION N/A 6/7/2021    Procedure: Left heart cath with possible intervention;  Surgeon: Chris Dave MD;  Location: [facility] CATH LAB;  Service: Cardiology;  Laterality: N/A;    rotator cuff      TOTAL ABDOMINAL HYSTERECTOMY      Transobtruator Tape Urethral Sling procedure and limited Cystoscopy  01/29/2020    at [facility];  no complications     Family History   Problem Relation Age of Onset    Pancreatic cancer Paternal Grandmother     Heart disease Father     Heart disease Mother     Uterine cancer Sister     Heart disease Sister     Cancer Sister     Stroke Brother      Social History     Tobacco Use    Smoking status: Never    Smokeless tobacco: Never   Substance Use Topics    Alcohol use: Yes     Comment: occassional    Drug use: Not Currently     Types: Flunitrazepam     Review of Systems   Constitutional:  Positive for appetite change, chills, fatigue and fever. Negative for diaphoresis.   HENT:  Negative for congestion, rhinorrhea, sinus pressure, sinus pain and sore throat.    Eyes:  Negative for visual disturbance.   Respiratory:  Negative for cough and shortness of breath.    Cardiovascular:  Negative for chest pain and palpitations.   Gastrointestinal:  Positive for nausea and vomiting. Negative for diarrhea.   Genitourinary:  Negative for dysuria, frequency and urgency.   Musculoskeletal:  Positive for myalgias. Negative for neck pain and neck stiffness.   Skin:  Negative for color change and rash.   Neurological:  Negative for dizziness, syncope and light-headedness.   Psychiatric/Behavioral:  Negative for confusion. The patient is nervous/anxious.    All other systems reviewed and are negative.      Physical Exam     Initial Vitals [08/19/23 2147]   BP Pulse Resp Temp SpO2   (!) 126/54 81 20 (!) 100.7 °F (38.2 °C) 95 %      MAP       --         Physical Exam    Nursing note and vitals  reviewed.  Constitutional: She appears well-developed and well-nourished. No distress.   HENT:   Head: Normocephalic.   Right Ear: Tympanic membrane, external ear and ear canal normal.   Left Ear: Tympanic membrane, external ear and ear canal normal.   Nose: Nose normal.   Mouth/Throat: Uvula is midline, oropharynx is clear and moist and mucous membranes are normal.   Eyes: Conjunctivae and EOM are normal. Pupils are equal, round, and reactive to light. No scleral icterus.   Neck: Neck supple.   Normal range of motion.  Cardiovascular:  Normal rate, regular rhythm, normal heart sounds and intact distal pulses.           Pulmonary/Chest: Breath sounds normal. No respiratory distress.   Abdominal: Abdomen is soft. Bowel sounds are normal. There is no abdominal tenderness.   Musculoskeletal:         General: No tenderness. Normal range of motion.      Cervical back: Normal range of motion and neck supple.     Lymphadenopathy:     She has no cervical adenopathy.   Neurological: She is alert and oriented to person, place, and time. She has normal strength. GCS score is 15. GCS eye subscore is 4. GCS verbal subscore is 5. GCS motor subscore is 6.   Skin: Skin is warm and dry. Capillary refill takes less than 2 seconds.   Psychiatric: She has a normal mood and affect. Her behavior is normal.         Medical Screening Exam   See Full Note    ED Course   Procedures  Labs Reviewed   COMPREHENSIVE METABOLIC PANEL - Abnormal; Notable for the following components:       Result Value    Glucose 108 (*)     Calcium 7.7 (*)     Albumin 3.0 (*)     eGFR 59 (*)     All other components within normal limits   URINALYSIS, REFLEX TO URINE CULTURE - Abnormal; Notable for the following components:    Protein, UA >=300 (*)     Blood, UA Trace-Intact (*)     All other components within normal limits   CBC WITH DIFFERENTIAL - Abnormal; Notable for the following components:    RBC 3.92 (*)     Hemoglobin 11.5 (*)     Hematocrit 34.5 (*)      Neutrophils % 67.8 (*)     Lymphocytes % 19.0 (*)     Lymphocytes, Absolute 0.98 (*)     Monocytes % 12.6 (*)     Eosinophils % 0.2 (*)     All other components within normal limits   LIPASE - Abnormal; Notable for the following components:    Lipase 29 (*)     All other components within normal limits   URINALYSIS, MICROSCOPIC - Abnormal; Notable for the following components:    WBC, UA 5-10 (*)     Bacteria, UA Few (*)     Squamous Epithelial Cells, UA Few (*)     All other components within normal limits   SARS-COV2 (COVID) W/ FLU ANTIGEN - Normal    Narrative:     Negative SARS-CoV results should not be used as the sole basis for treatment or patient management decisions; negative results should be considered in the context of a patient's recent exposures, history and the presene of clinical signs and symptoms consistent with COVID-19.  Negative results should be treated as presumptive and confirmed by molecular assay, if necessary for patient management.   LACTIC ACID, PLASMA - Normal   CULTURE, BLOOD   CULTURE, BLOOD   CBC W/ AUTO DIFFERENTIAL    Narrative:     The following orders were created for panel order CBC auto differential.  Procedure                               Abnormality         Status                     ---------                               -----------         ------                     CBC with Differential[[id number]]        Abnormal            Final result                 Please view results for these tests on the individual orders.          Imaging Results              XR ABDOMEN, ACUTE 2 OR MORE VIEWS WITH CHEST (In process)                      Medications   sodium chloride 0.9% bolus 500 mL 500 mL (has no administration in time range)   ibuprofen tablet 600 mg (600 mg Oral Given 8/19/23 2227)     Medical Decision Making  Marci Aguilera is a 72 y.o. White /female presenting to ED via EMS with fever, body aches, N/V for 5 days. She has been evaluated by PCP on 8/16 and [facility] ED  8/17 for sx with negative COVID/Flu and normal lab. Today she comes in with continued sx and 4 episodes of vomiting. She was given Zofran in route. Her main complaint is fever and body aches. States that she has been taking Motrin and Tylenol as directed. Currently in NAD. VSS at this time.      Amount and/or Complexity of Data Reviewed  Labs: ordered.  Radiology: ordered.    Risk  OTC drugs.  Prescription drug management.               ED Course as of 08/20/23 0045   Sun Aug 20, 2023   0030 Case discussed with Dr Foreman. He is in agreement with admission to observation. No need for abx at this time.  [LP]   0038 After discussion with patient, she states that she is feeling better and requesting d/c home. She was given strict return precautions. Discussed results. Patient is in agreement with plan to d/c home. V/u of all discharge instructions. No questions or concerns at this time. [LP]      ED Course User Index  [LP] Karon Maeyn, FNP               Medical Decision Making:   Initial Assessment:   Marci Aguilera is a 72 y.o. White /female presenting to ED via EMS with fever, body aches, N/V for 5 days. She has been evaluated by PCP on 8/16 and [facility] ED 8/17 for sx with negative COVID/Flu and normal lab. Today she comes in with continued sx and 4 episodes of vomiting. She was given Zofran in route. Her main complaint is fever and body aches. States that she has been taking Motrin and Tylenol as directed. Currently in NAD. VSS at this time. On arrival, patient is requesting admission to hospital.  Differential Diagnosis:   COVID, Flu, URI, UTI, gastroenteritis, Dehydration  Clinical Tests:   Lab Tests: Ordered and Reviewed       <> Summary of Lab: COVID- negative  Flu- negative  CBC- Hgb 11.5, Hct 34.5. Otherwise, unremarkable.    CMP- glucose 108  Lactic acid- 1.6  Lipase- 29  Urinalysis- protein > 300, trace occult, WBC 5-10, few bacteria, few squamous  BC x 2- pending  AAS- No radiographically evident  acute cardiopulmonary process. Nonobstructive bowel gas pattern. No free air seen by upright examination.  Radiological Study: Ordered and Reviewed  ED Management:  Motrin 600 mg PO- temp and body aches improved  NS 500cc bolus  PO challenge    D/c script for Zofran PRN    Other:   I have discussed this case with another health care provider.       <> Summary of the Discussion: Case discussed with Dr Foreman. He is in agreement with admission to observation. No need for abx at this time.   At this time, I do not feel that radiology studies or lab will add any benefit to care or diagnostics since there is no reported injury, signs of trauma. She has fever but no signs of sepsis or bacterial infection. I spoke with Dr Foreman at [facility] and he approved admission to [facility]. However, after discussion with patient, she feels better and is requesting d/c home at this time. I feel that patient has reached maximum benefit from ED evaluation, treatment and observation. I thoroughly explained all findings to patient to the best of my ability and answered all questions to their satisfaction. I educated patient on the general/expected course of the condition and treatment options in ED and after discharge. I also informed patient that our evaluation in the emergency department today is an evaluation of the condition in one moment in time. She has been given strict return precautions. If there is any worsening of the condition of if symptoms persist, the patient has been instructed to return to the ED immediately for further evaluation. Patient has also been advised to follow up with PCP in 2-3 days for re-evaluation. Patient verbalized understanding of the instructions and is agreeable to disposition. No questions or concerns at this time.     Dx:  fever, nausea/vomiting      Clinical Impression:   Final diagnoses:  [R50.9] Fever, unspecified fever cause (Primary)  [R11.2] Nausea and vomiting, unspecified vomiting type         ED Disposition Condition    Discharge Stable          ED Prescriptions       Medication Sig Dispense Start Date End Date Auth. Provider    ondansetron (ZOFRAN) 4 MG tablet Take 1 tablet (4 mg total) by mouth every 6 (six) hours as needed for Nausea. 30 tablet 8/20/2023 -- Karon Mayen, ANKUSH          Follow-up Information    None          Karon Mayen, ANKUSH  08/20/23 0045       Karon Mayen, ANKUSH  08/20/23 0045

## 2023-08-20 NOTE — ED TRIAGE NOTES
Has been sick since Tuesday.  Patient states she has body aches and fever since Tuesday.  States she has been taking tylenol and motrin in alternating doses.   Patient states she has vomited x 4 today and has taken Norco twice with sprite.  Patient has been seen here once this week and saw Trixie at the clinic.  Checked both times for covid and covid negative.

## 2023-08-22 ENCOUNTER — HOSPITAL ENCOUNTER (EMERGENCY)
Facility: HOSPITAL | Age: 73
Discharge: HOME | End: 2023-08-22
Attending: EMERGENCY MEDICINE
Payer: MEDICARE

## 2023-08-22 VITALS
TEMPERATURE: 100 F | HEIGHT: 68 IN | SYSTOLIC BLOOD PRESSURE: 153 MMHG | RESPIRATION RATE: 20 BRPM | WEIGHT: 220 LBS | DIASTOLIC BLOOD PRESSURE: 61 MMHG | HEART RATE: 78 BPM | BODY MASS INDEX: 33.34 KG/M2 | OXYGEN SATURATION: 97 %

## 2023-08-22 DIAGNOSIS — J98.4 PNEUMONITIS: Primary | ICD-10-CM

## 2023-08-22 LAB
ALBUMIN SERPL BCP-MCNC: 2.9 G/DL (ref 3.5–5)
ALBUMIN/GLOB SERPL: 0.8 {RATIO}
ALP SERPL-CCNC: 65 U/L (ref 55–142)
ALT SERPL W P-5'-P-CCNC: 46 U/L (ref 13–56)
ANION GAP SERPL CALCULATED.3IONS-SCNC: 10 MMOL/L (ref 7–16)
AST SERPL W P-5'-P-CCNC: 48 U/L (ref 15–37)
BASOPHILS # BLD AUTO: 0.04 K/UL (ref 0–0.2)
BASOPHILS NFR BLD AUTO: 0.6 % (ref 0–1)
BILIRUB SERPL-MCNC: 0.3 MG/DL (ref ?–1.2)
BILIRUB UR QL STRIP: NEGATIVE
BUN SERPL-MCNC: 9 MG/DL (ref 7–18)
BUN/CREAT SERPL: 10 (ref 6–20)
CALCIUM SERPL-MCNC: 7.8 MG/DL (ref 8.5–10.1)
CHLORIDE SERPL-SCNC: 101 MMOL/L (ref 98–107)
CLARITY UR: CLEAR
CO2 SERPL-SCNC: 28 MMOL/L (ref 21–32)
COLOR UR: ABNORMAL
CREAT SERPL-MCNC: 0.93 MG/DL (ref 0.55–1.02)
DIFFERENTIAL METHOD BLD: ABNORMAL
EGFR (NO RACE VARIABLE) (RUSH/TITUS): 65 ML/MIN/1.73M2
EOSINOPHIL # BLD AUTO: 0.01 K/UL (ref 0–0.5)
EOSINOPHIL NFR BLD AUTO: 0.1 % (ref 1–4)
ERYTHROCYTE [DISTWIDTH] IN BLOOD BY AUTOMATED COUNT: 14.4 % (ref 11.5–14.5)
FLUAV AG UPPER RESP QL IA.RAPID: NEGATIVE
FLUBV AG UPPER RESP QL IA.RAPID: NEGATIVE
GLOBULIN SER-MCNC: 3.5 G/DL (ref 2–4)
GLUCOSE SERPL-MCNC: 99 MG/DL (ref 74–106)
GLUCOSE UR STRIP-MCNC: NORMAL MG/DL
HCT VFR BLD AUTO: 33.4 % (ref 38–47)
HGB BLD-MCNC: 11.2 G/DL (ref 12–16)
IMM GRANULOCYTES # BLD AUTO: 0.04 K/UL (ref 0–0.04)
IMM GRANULOCYTES NFR BLD: 0.6 % (ref 0–0.4)
KETONES UR STRIP-SCNC: NEGATIVE MG/DL
LEUKOCYTE ESTERASE UR QL STRIP: NEGATIVE
LIPASE SERPL-CCNC: <19 U/L (ref 73–393)
LYMPHOCYTES # BLD AUTO: 1.62 K/UL (ref 1–4.8)
LYMPHOCYTES NFR BLD AUTO: 22.7 % (ref 27–41)
MCH RBC QN AUTO: 29.2 PG (ref 27–31)
MCHC RBC AUTO-ENTMCNC: 33.5 G/DL (ref 32–36)
MCV RBC AUTO: 87 FL (ref 80–96)
MONOCYTES # BLD AUTO: 0.44 K/UL (ref 0–0.8)
MONOCYTES NFR BLD AUTO: 6.2 % (ref 2–6)
MPC BLD CALC-MCNC: 10.6 FL (ref 9.4–12.4)
NEUTROPHILS # BLD AUTO: 4.99 K/UL (ref 1.8–7.7)
NEUTROPHILS NFR BLD AUTO: 69.8 % (ref 53–65)
NITRITE UR QL STRIP: NEGATIVE
NRBC # BLD AUTO: 0 X10E3/UL
NRBC, AUTO (.00): 0 %
PH UR STRIP: 6 PH UNITS
PLATELET # BLD AUTO: 226 K/UL (ref 150–400)
POTASSIUM SERPL-SCNC: 3.9 MMOL/L (ref 3.5–5.1)
PROT SERPL-MCNC: 6.4 G/DL (ref 6.4–8.2)
PROT UR QL STRIP: 50
RBC # BLD AUTO: 3.84 M/UL (ref 4.2–5.4)
RBC # UR STRIP: NEGATIVE /UL
RBC #/AREA URNS HPF: 1 /HPF
SARS-COV+SARS-COV-2 AG RESP QL IA.RAPID: NEGATIVE
SODIUM SERPL-SCNC: 135 MMOL/L (ref 136–145)
SP GR UR STRIP: 1.02
SQUAMOUS #/AREA URNS LPF: ABNORMAL /HPF
UROBILINOGEN UR STRIP-ACNC: NORMAL MG/DL
WBC # BLD AUTO: 7.14 K/UL (ref 4.5–11)
WBC #/AREA URNS HPF: 1 /HPF

## 2023-08-22 PROCEDURE — 85025 COMPLETE CBC W/AUTO DIFF WBC: CPT | Performed by: EMERGENCY MEDICINE

## 2023-08-22 PROCEDURE — 99284 PR EMERGENCY DEPT VISIT,LEVEL IV: ICD-10-PCS | Mod: ,,, | Performed by: EMERGENCY MEDICINE

## 2023-08-22 PROCEDURE — 96361 HYDRATE IV INFUSION ADD-ON: CPT

## 2023-08-22 PROCEDURE — 96375 TX/PRO/DX INJ NEW DRUG ADDON: CPT

## 2023-08-22 PROCEDURE — 87040 BLOOD CULTURE FOR BACTERIA: CPT | Performed by: EMERGENCY MEDICINE

## 2023-08-22 PROCEDURE — 81001 URINALYSIS AUTO W/SCOPE: CPT | Performed by: EMERGENCY MEDICINE

## 2023-08-22 PROCEDURE — 83690 ASSAY OF LIPASE: CPT | Performed by: EMERGENCY MEDICINE

## 2023-08-22 PROCEDURE — 80053 COMPREHEN METABOLIC PANEL: CPT | Performed by: EMERGENCY MEDICINE

## 2023-08-22 PROCEDURE — 83605 ASSAY OF LACTIC ACID: CPT | Performed by: EMERGENCY MEDICINE

## 2023-08-22 PROCEDURE — 96367 TX/PROPH/DG ADDL SEQ IV INF: CPT

## 2023-08-22 PROCEDURE — 96365 THER/PROPH/DIAG IV INF INIT: CPT

## 2023-08-22 PROCEDURE — 96376 TX/PRO/DX INJ SAME DRUG ADON: CPT

## 2023-08-22 PROCEDURE — 99284 EMERGENCY DEPT VISIT MOD MDM: CPT | Mod: ,,, | Performed by: EMERGENCY MEDICINE

## 2023-08-22 PROCEDURE — 99284 EMERGENCY DEPT VISIT MOD MDM: CPT | Mod: 25

## 2023-08-22 PROCEDURE — 87428 SARSCOV & INF VIR A&B AG IA: CPT | Performed by: EMERGENCY MEDICINE

## 2023-08-22 PROCEDURE — 63600175 PHARM REV CODE 636 W HCPCS: Performed by: EMERGENCY MEDICINE

## 2023-08-22 PROCEDURE — 25000003 PHARM REV CODE 250: Performed by: EMERGENCY MEDICINE

## 2023-08-22 RX ORDER — AZITHROMYCIN 500 MG/1
500 TABLET, FILM COATED ORAL DAILY
Qty: 5 TABLET | Refills: 0 | Status: SHIPPED | OUTPATIENT
Start: 2023-08-22 | End: 2023-08-27

## 2023-08-22 RX ORDER — KETOROLAC TROMETHAMINE 15 MG/ML
15 INJECTION, SOLUTION INTRAMUSCULAR; INTRAVENOUS
Status: COMPLETED | OUTPATIENT
Start: 2023-08-22 | End: 2023-08-22

## 2023-08-22 RX ORDER — ONDANSETRON 2 MG/ML
4 INJECTION INTRAMUSCULAR; INTRAVENOUS
Status: COMPLETED | OUTPATIENT
Start: 2023-08-22 | End: 2023-08-22

## 2023-08-22 RX ORDER — CEFDINIR 300 MG/1
300 CAPSULE ORAL 2 TIMES DAILY
Qty: 20 CAPSULE | Refills: 0 | Status: SHIPPED | OUTPATIENT
Start: 2023-08-22 | End: 2023-09-01

## 2023-08-22 RX ORDER — PREDNISONE 20 MG/1
40 TABLET ORAL DAILY
Qty: 10 TABLET | Refills: 0 | Status: SHIPPED | OUTPATIENT
Start: 2023-08-22 | End: 2023-08-27

## 2023-08-22 RX ADMIN — KETOROLAC TROMETHAMINE 15 MG: 15 INJECTION, SOLUTION INTRAMUSCULAR; INTRAVENOUS at 06:08

## 2023-08-22 RX ADMIN — ONDANSETRON 4 MG: 2 INJECTION INTRAMUSCULAR; INTRAVENOUS at 08:08

## 2023-08-22 RX ADMIN — DEXTROSE MONOHYDRATE 1 G: 5 INJECTION INTRAVENOUS at 07:08

## 2023-08-22 RX ADMIN — KETOROLAC TROMETHAMINE 15 MG: 15 INJECTION, SOLUTION INTRAMUSCULAR; INTRAVENOUS at 07:08

## 2023-08-22 RX ADMIN — AZITHROMYCIN DIHYDRATE 500 MG: 500 INJECTION, POWDER, LYOPHILIZED, FOR SOLUTION INTRAVENOUS at 08:08

## 2023-08-22 RX ADMIN — SODIUM CHLORIDE 1000 ML: 9 INJECTION, SOLUTION INTRAVENOUS at 05:08

## 2023-08-22 NOTE — ED TRIAGE NOTES
Presents to ED via EMS from home for c/o cough, headache, bodyaches, fever and chills x1 week. Patient was tested 3 days ago for COVID and was negative.

## 2023-08-22 NOTE — ED PROVIDER NOTES
Encounter Date: 8/22/2023    SCRIBE #1 NOTE: I, Christine Metcalf, am scribing for, and in the presence of,  Mekhi Thorpe MD. I have scribed the entire note.       History     Chief Complaint   Patient presents with    Generalized Body Aches    Fever    Cough    Headache     This is a 71 y/o white female,who presents to the ED for evaluation. She states for the past week she has been running a fever. She has taken Motrin and Tylenol which have not seemed to help. She notes she has had body aches for the past week as well. She was nauseated and vomiting but this has now resolved. She denies any diarrhea. She reports being short of breath. She does not wear home O2 normally.  She states she has been to [place] ED twice and has had 2 negative workups. She reports she has been tested for COVID, Flu and Strep which have all came back negative as well. She states she would like to be admitted due to the fact she thinks she is septic. There are no other complaints/pain in the ED at this time. She has a known hx of CAD, GERD, DVT, asthma. There is no surgical hx. She has a family medical Hx of cancer, heart disease, and stroke. There is no smoking hx.     The history is provided by the patient and the EMS personnel. No  was used.     Review of patient's allergies indicates:   Allergen Reactions    Statins-hmg-coa reductase inhibitors Other (See Comments)     Refuses to take due to causing muscle aches and joint pain    Chlorpheniramine     Codeine     Diphtheria toxin preparations     Dristan allergy Other (See Comments)     hallucinations    Oxymetazoline     Pheniramine     Phenylephrine     Pseudoephedrine     Tetanus vaccines and toxoid Other (See Comments)     Arm pain and swelling    Penicillins Rash     Past Medical History:   Diagnosis Date    Acute superficial gastritis without hemorrhage 8/15/2022    Atrophic vaginitis 05/12/2004    Coronary artery disease     Cystitis 02/2004    Deep vein  thrombosis     DVT (deep venous thrombosis)     Esophageal dysphagia 12/27/2021    Esophagitis 12/27/2021    HH (hiatus hernia) 12/27/2021    Pulmonary embolus     after Septic ~ 2019    Thyroid disease     Vitamin D deficiency      Past Surgical History:   Procedure Laterality Date    CATARACT EXTRACTION      CHOLECYSTECTOMY      CORONARY STENT PLACEMENT      per  2020    FLUOROSCOPIC URODYNAMIC STUDY  10/28/2019    interpreted on 10/29/2019 by Dr. Rudolph Bocanegra;  bladder pain during filling at 320cc,  urodynamic stress incontinence at 302cc without ISD,  no evidence of OAB symtomatology    HIP REPLACEMENT ARTHROPLASTY      JOINT REPLACEMENT      LEFT HEART CATHETERIZATION N/A 6/7/2021    Procedure: Left heart cath with possible intervention;  Surgeon: Chris Dave MD;  Location[facility] CATH LAB;  Service: Cardiology;  Laterality: N/A;    rotator cuff      TOTAL ABDOMINAL HYSTERECTOMY      Transobtruator Tape Urethral Sling procedure and limited Cystoscopy  01/29/2020    at [facility];  no complications     Family History   Problem Relation Age of Onset    Pancreatic cancer Paternal Grandmother     Heart disease Father     Heart disease Mother     Uterine cancer Sister     Heart disease Sister     Cancer Sister     Stroke Brother      Social History     Tobacco Use    Smoking status: Never    Smokeless tobacco: Never   Substance Use Topics    Alcohol use: Yes     Comment: occassional    Drug use: Not Currently     Types: Flunitrazepam     Review of Systems   Constitutional:  Positive for chills and fever.   Respiratory:  Positive for cough and shortness of breath.    Gastrointestinal:  Negative for nausea and vomiting.   Musculoskeletal:         Body aches .     All other systems reviewed and are negative.      Physical Exam     Initial Vitals [08/22/23 1643]   BP Pulse Resp Temp SpO2   (!) 147/69 83 17 (!) 100.4 °F (38 °C) 97 %      MAP       --         Physical  Exam    Nursing note and vitals reviewed.  HENT:   Head: Normocephalic and atraumatic.   Mouth/Throat: Oropharynx is clear and moist.   Eyes: Pupils are equal, round, and reactive to light.   Neck: Neck supple.   Normal range of motion.  Cardiovascular:  Normal rate and regular rhythm.           Pulmonary/Chest: Effort normal and breath sounds normal.   Abdominal: Abdomen is soft. She exhibits no distension.   Musculoskeletal:         General: Normal range of motion.      Cervical back: Normal range of motion and neck supple.     Neurological: She is alert.   Skin: Skin is warm. Capillary refill takes less than 2 seconds.   Psychiatric: She has a normal mood and affect.         ED Course   Procedures  Labs Reviewed   COMPREHENSIVE METABOLIC PANEL - Abnormal; Notable for the following components:       Result Value    Sodium 135 (*)     Calcium 7.8 (*)     Albumin 2.9 (*)     AST 48 (*)     All other components within normal limits   URINALYSIS, REFLEX TO URINE CULTURE - Abnormal; Notable for the following components:    Protein, UA 50 (*)     All other components within normal limits   LIPASE - Abnormal; Notable for the following components:    Lipase <19 (*)     All other components within normal limits   CBC WITH DIFFERENTIAL - Abnormal; Notable for the following components:    RBC 3.84 (*)     Hemoglobin 11.2 (*)     Hematocrit 33.4 (*)     Neutrophils % 69.8 (*)     Lymphocytes % 22.7 (*)     Monocytes % 6.2 (*)     Eosinophils % 0.1 (*)     Immature Granulocytes % 0.6 (*)     All other components within normal limits   URINALYSIS, MICROSCOPIC - Abnormal; Notable for the following components:    Squamous Epithelial Cells, UA Occasional (*)     All other components within normal limits   LACTIC ACID, PLASMA - Normal   SARS-COV2 (COVID) W/ FLU ANTIGEN - Normal    Narrative:     Negative SARS-CoV results should not be used as the sole basis for treatment or patient management decisions; negative results should be  considered in the context of a patient's recent exposures, history and the presene of clinical signs and symptoms consistent with COVID-19.  Negative results should be treated as presumptive and confirmed by molecular assay, if necessary for patient management.   CULTURE, BLOOD   CULTURE, BLOOD   CBC W/ AUTO DIFFERENTIAL    Narrative:     The following orders were created for panel order CBC auto differential.  Procedure                               Abnormality         Status                     ---------                               -----------         ------                     CBC with Differential[[id number]]        Abnormal            Final result                 Please view results for these tests on the individual orders.          Imaging Results              X-Ray Chest AP Portable (Final result)  Result time 08/22/23 17:28:16      Final result by Donny Garcia II, MD (08/22/23 17:28:16)                   Impression:      Findings suggest mild cardiac decompensation and / or pneumonitis.      Electronically signed by: Donny Garcia  Date:    08/22/2023  Time:    17:28               Narrative:    EXAMINATION:  XR CHEST AP PORTABLE    CLINICAL HISTORY:  Fever;    COMPARISON:  20 January 2023    TECHNIQUE:  XR CHEST AP PORTABLE    FINDINGS:  The heart and mediastinum are stable in size and configuration.  The pulmonary vascularity is slightly increased with bilateral increased interstitial lung density.  No other lung infiltrates, effusions, pneumothorax or other abnormality is demonstrated.                                    X-Rays:   Independently Interpreted Readings:   Chest X-Ray: Xray chest ap portable:   Findings suggest mild cardiac decompensation and / or pneumonitis.   Other Readings:  Xray chest ap portable:   Findings suggest mild cardiac decompensation and / or pneumonitis.    Medications   sodium chloride 0.9% bolus 1,000 mL 1,000 mL (0 mLs Intravenous Stopped 8/22/23 7300)   ketorolac  injection 15 mg (15 mg Intravenous Given 8/22/23 1846)   ketorolac injection 15 mg (15 mg Intravenous Given 8/22/23 1944)   cefTRIAXone (ROCEPHIN) 1 g in dextrose 5 % in water (D5W) 100 mL IVPB (MB+) (0 g Intravenous Stopped 8/22/23 2026)   azithromycin (ZITHROMAX) 500 mg in dextrose 5 % (D5W) 250 mL IVPB (0 mg Intravenous Stopped 8/22/23 2126)   ondansetron injection 4 mg (4 mg Intravenous Given 8/22/23 2047)     Medical Decision Making  This is a 71 y/o white female,who presents to the ED for evaluation. She states for the past week she has been running a fever. She has taken Motrin and Tylenol which have not seemed to help. She notes she has had body aches for the past week as well. She was nauseated and vomiting but this has now resolved. She denies any diarrhea. She reports being short of breath. She does not wear home O2 normally.  She states she has been to [place] ED twice and has had 2 negative workups. She reports she has been tested for COVID, Flu and Strep which have all came back negative as well. She states she would like to be admitted due to the fact she thinks she is septic.     Amount and/or Complexity of Data Reviewed  Labs: ordered. Decision-making details documented in ED Course.  Radiology: ordered and independent interpretation performed.     Details: Chest x-ray is consistent with pneumonitis.  Discussion of management or test interpretation with external provider(s): Patient WBC is normal but she has fever and her chest x-ray is consistent with pneumonitis.  I think she is having atypical pneumonia.  I will start her on oral antibiotics after IV Rocephin and Zithromax.  And discharge her home to follow up with her primary care physician.    Risk  Prescription drug management.              Attending Attestation:           Physician Attestation for Scribe:  Physician Attestation Statement for Scribe #1: I, Mekhi Thorpe MD, reviewed documentation, as scribed by Christine Metcalf in my presence,  and it is both accurate and complete.             ED Course as of 08/24/23 0252   e Aug 22, 2023   1659 Medical decision-making:  Differential diagnosis includes fever, COVID-19, influenza, UTI, pneumonia, sepsis, dehydration, viral syndrome.  All labs and imaging ordered and interpreted by me. [BB]   1731 Xray chest ap portable:   Findings suggest mild cardiac decompensation and / or pneumonitis. [BW]   1755 CBC is normal except mild anemia.  White blood count is normal. [BB]   1755 CMP is normal. [BB]   1926 Xray chest ap portable:   Findings suggest mild cardiac decompensation and / or pneumonitis. [BW]      ED Course User Index  [BB] Mekhi Thorpe MD  [BW] Christine Metcalf                      Clinical Impression:   Final diagnoses:  [J18.9] Pneumonitis (Primary)        ED Disposition Condition    Discharge Stable          ED Prescriptions       Medication Sig Dispense Start Date End Date Auth. Provider    cefdinir (OMNICEF) 300 MG capsule Take 1 capsule (300 mg total) by mouth 2 (two) times daily. for 10 days 20 capsule 8/22/2023 9/1/2023 Wilbert Galan MD    azithromycin (ZITHROMAX) 500 MG tablet Take 1 tablet (500 mg total) by mouth once daily. for 5 days 5 tablet 8/22/2023 8/27/2023 Wilbert Galan MD    predniSONE (DELTASONE) 20 MG tablet Take 2 tablets (40 mg total) by mouth once daily. for 5 days 10 tablet 8/22/2023 8/27/2023 Wilbert Galan MD          Follow-up Information    None          Wilbert Galan MD  08/24/23 0253

## 2023-08-23 LAB — LACTATE SERPL-SCNC: 0.7 MMOL/L (ref 0.4–2)

## 2023-08-26 LAB
BACTERIA BLD CULT: NORMAL
BACTERIA BLD CULT: NORMAL

## 2023-08-28 LAB
BACTERIA BLD CULT: NORMAL
BACTERIA BLD CULT: NORMAL

## 2023-09-13 ENCOUNTER — OFFICE VISIT (OUTPATIENT)
Dept: OBSTETRICS AND GYNECOLOGY | Facility: CLINIC | Age: 73
End: 2023-09-13
Payer: MEDICARE

## 2023-09-13 VITALS
SYSTOLIC BLOOD PRESSURE: 140 MMHG | RESPIRATION RATE: 18 BRPM | OXYGEN SATURATION: 97 % | TEMPERATURE: 98 F | HEART RATE: 69 BPM | BODY MASS INDEX: 34.01 KG/M2 | WEIGHT: 224.38 LBS | HEIGHT: 68 IN | DIASTOLIC BLOOD PRESSURE: 79 MMHG

## 2023-09-13 DIAGNOSIS — E55.9 VITAMIN D DEFICIENCY: ICD-10-CM

## 2023-09-13 DIAGNOSIS — E07.9 THYROID DISORDER: ICD-10-CM

## 2023-09-13 DIAGNOSIS — N95.2 VAGINITIS, ATROPHIC: ICD-10-CM

## 2023-09-13 DIAGNOSIS — Z01.419 ENCOUNTER FOR ANNUAL ROUTINE GYNECOLOGICAL EXAMINATION: ICD-10-CM

## 2023-09-13 DIAGNOSIS — R92.8 ABNORMAL MAMMOGRAM OF LEFT BREAST: ICD-10-CM

## 2023-09-13 DIAGNOSIS — N32.81 OAB (OVERACTIVE BLADDER): ICD-10-CM

## 2023-09-13 DIAGNOSIS — Z87.898 HISTORY OF PREDIABETES: ICD-10-CM

## 2023-09-13 DIAGNOSIS — N60.12 FIBROCYSTIC BREAST CHANGES OF BOTH BREASTS: ICD-10-CM

## 2023-09-13 DIAGNOSIS — N60.11 FIBROCYSTIC BREAST CHANGES OF BOTH BREASTS: ICD-10-CM

## 2023-09-13 DIAGNOSIS — E11.9 TYPE 2 DIABETES MELLITUS WITHOUT COMPLICATION, WITHOUT LONG-TERM CURRENT USE OF INSULIN: ICD-10-CM

## 2023-09-13 DIAGNOSIS — Z96.641 S/P TOTAL RIGHT HIP ARTHROPLASTY: ICD-10-CM

## 2023-09-13 DIAGNOSIS — N81.6 BADEN-WALKER GRADE 1 RECTOCELE: ICD-10-CM

## 2023-09-13 LAB
BILIRUB UR QL STRIP: NEGATIVE
CLARITY UR: CLEAR
COLOR UR: NORMAL
GLUCOSE UR STRIP-MCNC: NORMAL MG/DL
KETONES UR STRIP-SCNC: NEGATIVE MG/DL
LEUKOCYTE ESTERASE UR QL STRIP: NEGATIVE
MUCOUS, UA: ABNORMAL /LPF
NITRITE UR QL STRIP: NEGATIVE
PH UR STRIP: 6 PH UNITS
PROT UR QL STRIP: NEGATIVE
RBC # UR STRIP: NEGATIVE /UL
SP GR UR STRIP: 1.01
SQUAMOUS #/AREA URNS LPF: ABNORMAL /HPF
UROBILINOGEN UR STRIP-ACNC: NORMAL MG/DL
WBC #/AREA URNS HPF: <1 /HPF

## 2023-09-13 PROCEDURE — 99215 OFFICE O/P EST HI 40 MIN: CPT | Mod: PBBFAC | Performed by: OBSTETRICS & GYNECOLOGY

## 2023-09-13 PROCEDURE — 99215 PR OFFICE/OUTPT VISIT, EST, LEVL V, 40-54 MIN: ICD-10-PCS | Mod: S$PBB,,, | Performed by: OBSTETRICS & GYNECOLOGY

## 2023-09-13 PROCEDURE — 81001 URINALYSIS: ICD-10-PCS | Mod: ,,, | Performed by: CLINICAL MEDICAL LABORATORY

## 2023-09-13 PROCEDURE — 81001 URINALYSIS AUTO W/SCOPE: CPT | Mod: ,,, | Performed by: CLINICAL MEDICAL LABORATORY

## 2023-09-13 PROCEDURE — 99215 OFFICE O/P EST HI 40 MIN: CPT | Mod: S$PBB,,, | Performed by: OBSTETRICS & GYNECOLOGY

## 2023-09-13 PROCEDURE — G0123 SCREEN CERV/VAG THIN LAYER: HCPCS | Mod: TC,GCY | Performed by: OBSTETRICS & GYNECOLOGY

## 2023-09-13 RX ORDER — ESTRADIOL 0.1 MG/G
1 CREAM VAGINAL
Qty: 42.5 G | Refills: 2 | Status: SHIPPED | OUTPATIENT
Start: 2023-09-13 | End: 2024-09-12

## 2023-09-13 NOTE — PATIENT INSTRUCTIONS
Dr. Davey Ragsdale thanks you for this office visit at [facility].    Diagnosis for this visit:   Problem List Items Addressed This Visit          Orthopedic    S/P total right hip arthroplasty     Other Visit Diagnoses       OAB (overactive bladder)    -  Primary    Abnormal mammogram of left breast        Vaginitis, atrophic        [place]-Walker grade 1 rectocele        Encounter for annual routine gynecological examination        Fibrocystic breast changes of both breasts                 The Plan:Screening labs; vitamin-D check; hemoglobin A1c check since she has a history of probable pre diabetes condition; thin prep; hemoccult screen was negative today             At the patient's request schedule a thyroid ultrasound; recommend that the patient has a presumed bi rad 3 from report by the patient of her mammogram in [place], [place].  In view of the negative findings and breast examination today, Dr. Ragsdale recommends continue with the mammography and ultrasound as scheduled in 6 months.  She was reassured.              Dr. Ragsdale recommend renewal of oxybutynin to continue to treat overactive bladder issues.  Dr. Ragsdale does recommend use intermittently possibly once a week of the expensive topical vaginal estrogen to help promote vaginal health and help her bladder issues.  She was advised to decrease caffeine in her diet to help with the overactive bladder.              With the patient having a stable marital relationship and at her age and normal previous Pap smears, Dr. Ragsdale recommends no further Pap smear checkups unless her marital life changes with a new sexual partner.            Please practice best food and exercise habits for your age.    Dr. Davey Ragsdale recommends avoidance of smoking and illicit medications or habits.    Please call  or schedule for any change in your health.     Please keep the next scheduled appointment or call for any need to change the appointment.       Davey Ragsdale recommends yearly exams for good health maintenance.      Thank you    Dr. Davey Ragsdale  09/13/2023 9:31 AM

## 2023-09-13 NOTE — PROGRESS NOTES
Marci Elinor Aguilera female  for   Chief Complaint   Patient presents with    Follow-up     Patient is here for a follow up, as per patient she had a mammogram done last month with her general doctor in [facility] and stated that she was told there were lumps on her left breast. Besides that patient stated there are no discomforts or pains.       OB History          4    Para        Term                AB        Living             SAB        IAB        Ectopic        Multiple        Live Births   4                  PHI:  [age]-year-old  4, para 4  female presents with several reasons for follow-up.  The 1st reason for follow-up is evaluation of her overactive bladder condition.                      The 2nd reason for follow-up is abnormal mammogram on her left breast performed in [facility] recently indicating cyst changes on the odor upper aspect of left breast.                      Patient wants continued evaluation of her thyroid nodules on her right; she is had a partial thyroidectomy according to her history.                        The patient is tolerating oxybutynin without any significant problems.  The patient states that she has intermittent nocturia and intermittent days of overactive bladder but other days without complaints.  She is not taking the prescribed topical vaginal estrogen due to expense.  She is not complain of any recent cystitis.  At her last visit she did have an E coli cystitis - she was treated but she did not return for follow-up test for cure.                      Surgical history indicates she is had in the distant past a total abdominal hysterectomy and she is had in  a trans obturator tape urethral sling procedure with success.  She does not complain of urinary stress incontinence.                      The patient is on no oral or transdermal estrogen.                        Past surgical history gynecological addition to the  trans obturator tape urethral sling procedure was in the distant past a total abdominal hysterectomy with bilateral salpingectomy but preservation of ovaries.                                        Three patient is sexually active infrequently with her .  Infrequency due to primarily ED problems with the .                    The recent was evaluated for possible COVID 19.  She is had previous COVID-19 2 years ago.  She does not have any vaccination.  Studies indicate the COVID-19 test was negative but clinically with fever and 7 days the illness patient probably had the new omicron variant of COVID-19.  Patient feels she developed a bacterial pneumonia and she was treated with antibiotics finally and she had resolve the upper respiratory problem and lower respiratory problem of pneumoniae.                       Past Medical History:   Diagnosis Date    Acute superficial gastritis without hemorrhage 8/15/2022    Atrophic vaginitis 05/12/2004    Coronary artery disease     Cystitis 02/2004    Deep vein thrombosis     DVT (deep venous thrombosis)     Esophageal dysphagia 12/27/2021    Esophagitis 12/27/2021    HH (hiatus hernia) 12/27/2021    Pulmonary embolus     after Septic ~ 2019    Thyroid disease     Vitamin D deficiency       Past Surgical History:   Procedure Laterality Date    CATARACT EXTRACTION      CHOLECYSTECTOMY      CORONARY STENT PLACEMENT      per  2020    FLUOROSCOPIC URODYNAMIC STUDY  10/28/2019    interpreted on 10/29/2019 by Dr. Rudolph Bocanegra;  bladder pain during filling at 320cc,  urodynamic stress incontinence at 302cc without ISD,  no evidence of OAB symtomatology    HIP REPLACEMENT ARTHROPLASTY      JOINT REPLACEMENT      LEFT HEART CATHETERIZATION N/A 6/7/2021    Procedure: Left heart cath with possible intervention;  Surgeon: Chris Dave MD;  Location: [facility] CATH LAB;  Service: Cardiology;  Laterality: N/A;    rotator cuff      TOTAL ABDOMINAL HYSTERECTOMY   "    Transobtruator Tape Urethral Sling procedure and limited Cystoscopy  01/29/2020    at [facility];  no complications      Review of patient's allergies indicates:   Allergen Reactions    Statins-hmg-coa reductase inhibitors Other (See Comments)     Refuses to take due to causing muscle aches and joint pain    Chlorpheniramine     Codeine     Diphtheria toxin preparations     Dristan allergy Other (See Comments)     hallucinations    Oxymetazoline     Pheniramine     Phenylephrine     Pseudoephedrine     Tetanus vaccines and toxoid Other (See Comments)     Arm pain and swelling    Penicillins Rash        ROS:Pertinent items are noted in HPI.    Physical exam:    BP (!) 140/79 (BP Location: Left arm, Patient Position: Sitting)   Pulse 69   Temp 98 °F (36.7 °C)   Resp 18   Ht 5' 8" (1.727 m)   Wt 101.8 kg (224 lb 6.4 oz)   SpO2 97%   BMI 34.12 kg/m²      General Appearance: healthy, alert, no distress, smiling    HEENT: normal exam    Lymphatic: no palpable lymphadenopathy, no hepatosplenomegaly    Chest:         Breasts:No dimpling, nipple retraction or discharge. No masses or nodes., Normal to inspection, Normal breast tissue bilaterally, bilateral fibrocystic changes, and no breast deformity is appreciated observation and no palpable masses appreciated.  She has no lymphadenopathy of either axilla and there is no supraclavicular lymphadenopathy.  There is no skin changes.         Lungs: clear to auscultation bilaterally         Heart: regular rate and rhythm, S1, S2 normal, no murmur, click, rub or gallop    Abdomen:soft, non-tender, without masses or organomegaly, normal bowel sounds, without guarding, without rebound, and Pfannenstiel incision without any evidence of a ventral incisional hernia; moderate obesity; no abdominal bruit and no evidence of ascites.    Pelvic:             Vulva:normal, normal female genitalia, Bartholin's, Urethra, [place] normal, no lesions, " normal female hair distribution, no clitoral enlargement, mild atrophy changes consistent with her age slight bulging viewed of the vaginal introitus of a low asymptomatic rectocele.            Vagina:normal mucosa, no discharge, atrophic, good urethral support and no erosion of the mesh of mid urethral pexy sling; the patient has no cystocele and no vault prolapse.  There is a very low first-degree asymptomatic rectocele; vaginal rugae flat and very pale; no vaginal discharge; no evidence of any vaginal malignancy noted on speculum exam            Uterus: surgically absent uterus and cervix            Adnexae:  Left and right ovary not palpable-consistent with her age    Rectal:negative, stool guaiac negative    Extremity: normal, extremities warm, no clubbing, no cyanosis, no edema, non-tender; no CVA tenderness bilaterally    Skin: normal exam; posterior bilateral hip scars from posterior approach hip replacement surgery        Assessment:   Problem List Items Addressed This Visit          Orthopedic    S/P total right hip arthroplasty     Other Visit Diagnoses       OAB (overactive bladder)    -  Primary    Abnormal mammogram of left breast        Vaginitis, atrophic        [place]-Walker grade 1 rectocele        Encounter for annual routine gynecological examination        Fibrocystic breast changes of both breasts        History of prediabetes        Thyroid disorder        Nodules    Vitamin D deficiency                 Plan:  Screening labs; vitamin-D check; hemoglobin A1c check since she has a history of probable pre diabetes condition; thin prep; hemoccult screen was negative today             At the patient's request schedule a thyroid ultrasound; recommend that the patient has a presumed bi rad 3 from report by the patient of her mammogram in [place], [place].  In view of the negative findings and breast examination today, Dr. Ragsdale recommends continue with the mammography and ultrasound as  scheduled in 6 months.  She was reassured.              Dr. Ragsdale recommend renewal of oxybutynin to continue to treat overactive bladder issues.  Dr. Ragsdale does recommend use intermittently possibly once a week of the expensive topical vaginal estrogen to help promote vaginal health and help her bladder issues.  She was advised to decrease caffeine in her diet to help with the overactive bladder.              With the patient having a stable marital relationship and at her age and normal previous Pap smears, Dr. Ragsdale recommends no further Pap smear checkups unless her marital life changes with a new sexual partner.

## 2023-09-15 LAB
GH SERPL-MCNC: NORMAL NG/ML
INSULIN SERPL-ACNC: NORMAL U[IU]/ML
LAB AP CLINICAL INFORMATION: NORMAL
LAB AP GYN INTERPRETATION: NEGATIVE
LAB AP PAP DISCLAIMER COMMENTS: NORMAL
RENIN PLAS-CCNC: NORMAL NG/ML/H

## 2024-01-29 DIAGNOSIS — N32.81 OAB (OVERACTIVE BLADDER): Primary | ICD-10-CM

## 2024-01-29 RX ORDER — OXYBUTYNIN CHLORIDE 5 MG/1
5 TABLET, EXTENDED RELEASE ORAL
Qty: 30 TABLET | Refills: 11 | Status: SHIPPED | OUTPATIENT
Start: 2024-01-29

## 2024-04-25 NOTE — PROCEDURES
PFTs  Forced vital capacity 2.35 L 73% predicted  FEV1 2.05 L 83% predicted  FEV1 ratio 87%  Decreased ERV otherwise borderline decreased lung volumes  Normal DLCO  Mild restrictive ventilatory impairment this extrathoracic   Jaundice

## 2024-07-29 ENCOUNTER — OFFICE VISIT (OUTPATIENT)
Dept: OBSTETRICS AND GYNECOLOGY | Facility: CLINIC | Age: 74
End: 2024-07-29
Payer: MEDICARE

## 2024-07-29 VITALS
SYSTOLIC BLOOD PRESSURE: 160 MMHG | TEMPERATURE: 97 F | HEIGHT: 68 IN | WEIGHT: 223 LBS | HEART RATE: 68 BPM | BODY MASS INDEX: 33.8 KG/M2 | RESPIRATION RATE: 17 BRPM | OXYGEN SATURATION: 98 % | DIASTOLIC BLOOD PRESSURE: 73 MMHG

## 2024-07-29 DIAGNOSIS — N30.90 CYSTITIS: ICD-10-CM

## 2024-07-29 DIAGNOSIS — N95.2 VAGINITIS, ATROPHIC: ICD-10-CM

## 2024-07-29 DIAGNOSIS — N32.81 OAB (OVERACTIVE BLADDER): Primary | ICD-10-CM

## 2024-07-29 DIAGNOSIS — E66.9 CLASS 1 OBESITY WITH BODY MASS INDEX (BMI) OF 33.0 TO 33.9 IN ADULT, UNSPECIFIED OBESITY TYPE, UNSPECIFIED WHETHER SERIOUS COMORBIDITY PRESENT: ICD-10-CM

## 2024-07-29 DIAGNOSIS — M62.08 DIASTASIS RECTI: ICD-10-CM

## 2024-07-29 LAB
BILIRUB UR QL STRIP: NEGATIVE
CLARITY UR: CLEAR
COLOR UR: COLORLESS
GLUCOSE UR STRIP-MCNC: NORMAL MG/DL
KETONES UR STRIP-SCNC: NEGATIVE MG/DL
LEUKOCYTE ESTERASE UR QL STRIP: NEGATIVE
MUCOUS, UA: ABNORMAL /LPF
NITRITE UR QL STRIP: NEGATIVE
PH UR STRIP: 6 PH UNITS
PROT UR QL STRIP: NEGATIVE
RBC # UR STRIP: NEGATIVE /UL
RBC #/AREA URNS HPF: <1 /HPF
SP GR UR STRIP: 1.01
SQUAMOUS #/AREA URNS LPF: ABNORMAL /HPF
UROBILINOGEN UR STRIP-ACNC: NORMAL MG/DL
WBC #/AREA URNS HPF: 1 /HPF

## 2024-07-29 PROCEDURE — 99214 OFFICE O/P EST MOD 30 MIN: CPT | Mod: S$PBB,,, | Performed by: OBSTETRICS & GYNECOLOGY

## 2024-07-29 PROCEDURE — 99999 PR PBB SHADOW E&M-EST. PATIENT-LVL V: CPT | Mod: PBBFAC,,, | Performed by: OBSTETRICS & GYNECOLOGY

## 2024-07-29 PROCEDURE — 99459 PELVIC EXAMINATION: CPT | Mod: PBBFAC | Performed by: OBSTETRICS & GYNECOLOGY

## 2024-07-29 PROCEDURE — 81001 URINALYSIS AUTO W/SCOPE: CPT | Mod: ,,, | Performed by: CLINICAL MEDICAL LABORATORY

## 2024-07-29 PROCEDURE — 99459 PELVIC EXAMINATION: CPT | Mod: S$PBB,,, | Performed by: OBSTETRICS & GYNECOLOGY

## 2024-07-29 PROCEDURE — 99215 OFFICE O/P EST HI 40 MIN: CPT | Mod: PBBFAC | Performed by: OBSTETRICS & GYNECOLOGY

## 2024-07-29 RX ORDER — NITROFURANTOIN 25; 75 MG/1; MG/1
100 CAPSULE ORAL 2 TIMES DAILY
Qty: 20 CAPSULE | Refills: 0 | Status: SHIPPED | OUTPATIENT
Start: 2024-07-29 | End: 2024-08-08

## 2024-07-29 RX ORDER — SOLIFENACIN SUCCINATE 5 MG/1
5 TABLET, FILM COATED ORAL DAILY
Qty: 30 TABLET | Refills: 2 | Status: SHIPPED | OUTPATIENT
Start: 2024-07-29 | End: 2025-07-29

## 2024-07-29 NOTE — PATIENT INSTRUCTIONS
Dr. Davey Ragsdale thanks you for this office visit at [facility].    Diagnosis for this visit:   Problem List Items Addressed This Visit    None  Visit Diagnoses       OAB (overactive bladder)    -  Primary    Relevant Orders    Urinalysis, Reflex to Urine Culture    Vaginitis, atrophic        Cystitis        Doubtful    Relevant Orders    Urinalysis, Reflex to Urine Culture    Diastasis recti        Class 1 obesity with body mass index (BMI) of 33.0 to 33.9 in adult, unspecified obesity type, unspecified whether serious comorbidity present                 The Plan:  Urinalysis and reflex urine culture             Patient was advised to do Kegel exercises; patient was advised to use on a routine basis her estrogen vaginal cream;             After discussion patient is agreeable to have InterStim testing.  In the interim patient be placed on VESIcare.  If the patient fails VESIcare, then to medication failure she has a candidate for InterStim.  Patient be placed on Macrobid while culture is pending.  Patient was fearful she has a UTI.                  Follow-up in 4-6 weeks to evaluate for possibly scheduling for interim therapy         Please practice best food and exercise habits for your age.    Dr. Davey Ragsdale recommends avoidance of smoking and illicit medications or habits.    Please call  or schedule for any change in your health.     Please keep the next scheduled appointment or call for any need to change the appointment.     Dr. Davey Ragsdale recommends yearly exams for good health maintenance.      Thank you    Dr. Davey Ragsdale  07/29/2024 9:46 AM

## 2024-07-29 NOTE — PROGRESS NOTES
Marci Aguilera female  for   Chief Complaint   Patient presents with    overactive bladder     Wants to up the dosage on the oxybutin for her OAB, stating that the current dosage is not working anymore.       OB History          4    Para        Term                AB        Living             SAB        IAB        Ectopic        Multiple        Live Births   4                  PHI:  Patient who is 73 years of age  4, para 4  female who has had a JOSELIN without BSO in the past as well as a trans obturator tape urethral sling procedure in the past reports that she feels that the current dose of oxybutynin XL 5 mg daily is not helping.  Patient has no nocturia symptoms.  Patient has daytime overactive bladder symptoms.  She denies dysuria.  She does describe a vaginal odor and she is worried that she may have a UTI; she usually associates an odor with the cystitis..  She denies any significant symptoms urinary stress incontinence.  She does occasionally use her topical vaginal estrogen.    Past Medical History:   Diagnosis Date    Acute superficial gastritis without hemorrhage 8/15/2022    Atrophic vaginitis 2004    Coronary artery disease     Cystitis 2004    Deep vein thrombosis     DVT (deep venous thrombosis)     Esophageal dysphagia 2021    Esophagitis 2021    HH (hiatus hernia) 2021    Pulmonary embolus     after Septic ~     Thyroid disease     Vitamin D deficiency       Past Surgical History:   Procedure Laterality Date    CATARACT EXTRACTION      CHOLECYSTECTOMY      CORONARY STENT PLACEMENT      per      FLUOROSCOPIC URODYNAMIC STUDY  10/28/2019    interpreted on 10/29/2019 by Dr. Rudolph Bocanegra;  bladder pain during filling at 320cc,  urodynamic stress incontinence at 302cc without ISD,  no evidence of OAB symtomatology    HIP REPLACEMENT ARTHROPLASTY      JOINT REPLACEMENT      LEFT HEART CATHETERIZATION N/A 2021    Procedure:  "Left heart cath with possible intervention;  Surgeon: Chris Dave MD;  Location: [facility] CATH LAB;  Service: Cardiology;  Laterality: N/A;    rotator cuff      TOTAL ABDOMINAL HYSTERECTOMY      Transobtruator Tape Urethral Sling procedure and limited Cystoscopy  01/29/2020    at [facility];  no complications      Review of patient's allergies indicates:   Allergen Reactions    Statins-hmg-coa reductase inhibitors Other (See Comments)     Refuses to take due to causing muscle aches and joint pain    Chlorpheniramine     Codeine     Diphtheria toxin preparations     Dristan allergy Other (See Comments)     hallucinations    Oxymetazoline     Pheniramine     Phenylephrine     Pseudoephedrine     Tetanus vaccines and toxoid Other (See Comments)     Arm pain and swelling    Penicillins Rash        ROS:Pertinent items are noted in HPI.    Physical exam:This gyn related exam was chaperoned by a female medical assistant.      BP (!) 160/73 (BP Location: Right arm, Patient Position: Sitting)   Pulse 68   Temp 97 °F (36.1 °C)   Resp 17   Ht 5' 8" (1.727 m)   Wt 101.2 kg (223 lb)   SpO2 98%   BMI 33.91 kg/m²      General Appearance: alert, no distress, smiling, overweight  female    HEENT: normal exam    Lymphatic: no palpable lymphadenopathy, no hepatosplenomegaly    Chest:         Breasts:  Not checked           Lungs: clear to auscultation bilaterally         Heart: regular rate and rhythm, S1, S2 normal, no murmur, click, rub or gallop    Abdomen:soft, non-tender, without masses or organomegaly, protuberant, and diastasis recti-new finding; Pfannenstiel incision unremarkable ; a diastasis recti is in the epigastrium in his moderate but no evidence of obstruction and nontender.    Pelvic:             Vulva:normal, normal female genitalia, Bartholin's, Urethra, Warren normal, no lesions, normal female hair distribution, no clitoral enlargement, generalized age-related " atrophy            Vagina:normal mucosa, no discharge, atrophic, no urethrocele on coughing straining; urinary stress incontinence on coughing or straining; no cystocele, no vault prolapse and no rectocele noted on exam.  Vaginal rugae are flat and overall appearance is pale consistent with atrophy.            Uterus: surgically absent uterus and cervix            Adnexae: surgically absent    Rectal:deferred    Extremity: normal, extremities warm, no clubbing, no cyanosis, no edema, non-tender; no CVA tenderness.    Skin: normal exam        Assessment:   Problem List Items Addressed This Visit    None  Visit Diagnoses       OAB (overactive bladder)    -  Primary    Vaginitis, atrophic        Cystitis        Doubtful    Diastasis recti        Class 1 obesity with body mass index (BMI) of 33.0 to 33.9 in adult, unspecified obesity type, unspecified whether serious comorbidity present                 Plan:  Urinalysis and reflex urine culture             Patient was advised to do Kegel exercises; patient was advised to use on a routine basis her estrogen vaginal cream;             After discussion patient is agreeable to have InterStim testing.  In the interim patient be placed on VESIcare.  If the patient fails VESIcare, then to medication failure she has a candidate for InterStim.  Patient be placed on Macrobid while culture is pending.  Patient was fearful she has a UTI.                  Follow-up in 4-6 weeks to evaluate for possibly scheduling for interim therapy

## 2024-08-27 ENCOUNTER — TELEPHONE (OUTPATIENT)
Dept: FAMILY MEDICINE | Facility: CLINIC | Age: 74
End: 2024-08-27
Payer: MEDICARE

## 2024-08-27 ENCOUNTER — OFFICE VISIT (OUTPATIENT)
Dept: FAMILY MEDICINE | Facility: CLINIC | Age: 74
End: 2024-08-27

## 2024-08-27 VITALS
WEIGHT: 223 LBS | OXYGEN SATURATION: 96 % | HEIGHT: 68 IN | SYSTOLIC BLOOD PRESSURE: 137 MMHG | DIASTOLIC BLOOD PRESSURE: 78 MMHG | TEMPERATURE: 98 F | RESPIRATION RATE: 18 BRPM | BODY MASS INDEX: 33.8 KG/M2 | HEART RATE: 78 BPM

## 2024-08-27 DIAGNOSIS — Z02.89 ENCOUNTER FOR OTHER ADMINISTRATIVE EXAMINATIONS: Primary | ICD-10-CM

## 2024-08-27 PROCEDURE — 99499 UNLISTED E&M SERVICE: CPT | Mod: ,,, | Performed by: NURSE PRACTITIONER

## 2024-08-27 NOTE — LETTER
AUTHORIZATION FOR RELEASE OF   CONFIDENTIAL INFORMATION    Dear [facility] [place],    We are seeing Marci Aguilera, date of birth 1950, in the clinic at [facility], [place], [place], for her DOT physical. Marci Aguilera has an outstanding lab/procedure at the time we reviewed her chart. In order to help keep her health information updated, she has authorized us to request the following medical record(s):        (  )  MAMMOGRAM                                      (  )  COLONOSCOPY      (  )  PAP SMEAR                                          (  )  OUTSIDE LAB RESULTS     (  )  DEXA SCAN                                          (  )  EYE EXAM            (  )  FOOT EXAM                                          (  )  ENTIRE RECORD    (  )  OUTSIDE IMMUNIZATIONS                 ( X )  _most recent stress test results_         Please fax records to [facility], [phone]     If you have any questions, please contact Wanda Thrasher RN, at [phone].           Patient Name: Marci Aguilera  : 1950  Patient Phone #: [phone]

## 2024-08-27 NOTE — PROGRESS NOTES
Lisa Acosta DNP, [facility]    [address] [place] [address]     PATIENT NAME: aMrci Aguilera  : 1950  DATE: 24  MRN: [id number]      Billing Provider: Lisa Acosta DNP, FNP  Level of Service: NO LOS  Patient PCP Information       Provider PCP Type    Primary Doctor No General            Reason for Visit / Chief Complaint: DOT physical       Update PCP  Update Chief Complaint         History of Present Illness / Problem Focused Workflow     Marci Aguilera presents to the clinic with DOT physical     Hx CAD followed by [facility]. Had stress test 2024. Copy in media    Review of Systems     Review of Systems   Constitutional:  Negative for activity change, appetite change, chills, fatigue and fever.   HENT:  Negative for nasal congestion, ear pain, hearing loss, postnasal drip and sore throat.    Respiratory:  Negative for cough, chest tightness, shortness of breath and wheezing.    Cardiovascular:  Negative for chest pain, palpitations, leg swelling and claudication.   Gastrointestinal:  Negative for abdominal pain, change in bowel habit, constipation, diarrhea, nausea and vomiting.   Genitourinary:  Negative for dysuria.   Musculoskeletal:  Negative for arthralgias, back pain, gait problem and myalgias.   Integumentary:  Negative for rash.   Neurological:  Negative for weakness and headaches.   Psychiatric/Behavioral:  Negative for suicidal ideas. The patient is not nervous/anxious.         Medical / Social / Family History     Past Medical History:   Diagnosis Date    Acute superficial gastritis without hemorrhage 8/15/2022    Atrophic vaginitis 2004    Coronary artery disease     Cystitis 2004    Deep vein thrombosis     DVT (deep venous thrombosis)     Esophageal dysphagia 2021    Esophagitis 2021    HH (hiatus hernia) 2021    Pulmonary embolus     after Septic ~ 2019    Thyroid disease     Vitamin D deficiency        Past  Surgical History:   Procedure Laterality Date    CATARACT EXTRACTION      CHOLECYSTECTOMY      CORONARY STENT PLACEMENT      per  2020    FLUOROSCOPIC URODYNAMIC STUDY  10/28/2019    interpreted on 10/29/2019 by Dr. Rudolph Bocanegra;  bladder pain during filling at 320cc,  urodynamic stress incontinence at 302cc without ISD,  no evidence of OAB symtomatology    HIP REPLACEMENT ARTHROPLASTY      JOINT REPLACEMENT      LEFT HEART CATHETERIZATION N/A 6/7/2021    Procedure: Left heart cath with possible intervention;  Surgeon: Chris Dave MD;  Location: [facility] CATH LAB;  Service: Cardiology;  Laterality: N/A;    rotator cuff      TOTAL ABDOMINAL HYSTERECTOMY      Transobtruator Tape Urethral Sling procedure and limited Cystoscopy  01/29/2020    at [facility];  no complications       Social History  Ms. Marci Aguilera  reports that she has never smoked. She has never been exposed to tobacco smoke. She has never used smokeless tobacco. She reports current alcohol use. She reports that she does not currently use drugs after having used the following drugs: Flunitrazepam.    Family History  Ms. Marci Aguilera's family history includes Cancer in her brother and sister; Heart disease in her father, mother, and sister; Pancreatic cancer in her paternal grandmother; Stroke in her brother; Uterine cancer in her sister.    Medications and Allergies     Medications  Outpatient Medications Marked as Taking for the 8/27/24 encounter (Office Visit) with Lisa Acosta DNP, FNP   Medication Sig Dispense Refill    ibuprofen (ADVIL ORAL) Take by mouth.      omeprazole magnesium (PRILOSEC ORAL) Take by mouth.      solifenacin (VESICARE) 5 MG tablet Take 1 tablet (5 mg total) by mouth once daily. 30 tablet 2       Allergies  Review of patient's allergies indicates:   Allergen Reactions    Statins-hmg-coa reductase inhibitors Other (See Comments)     Refuses to take due to causing  "muscle aches and joint pain    Chlorpheniramine     Codeine Hallucinations    Diphtheria toxin preparations     Dristan allergy Other (See Comments)     hallucinations    Dristan spray Other (See Comments)    Oxymetazoline     Oxymetazoline hcl Other (See Comments)    Pheniramine     Tetanus vaccines and toxoid Other (See Comments)     Arm pain and swelling    Penicillins Rash       Physical Examination     Vitals:    08/27/24 1503   BP: 137/78   BP Location: Left arm   Patient Position: Sitting   BP Method: Large (Automatic)   Pulse: 78   Resp: 18   Temp: 98.3 °F (36.8 °C)   TempSrc: Oral   SpO2: 96%   Weight: 101.2 kg (223 lb)   Height: 5' 8" (1.727 m)     Physical Exam  Vitals and nursing note reviewed.   Constitutional:       General: She is not in acute distress.     Appearance: Normal appearance. She is obese. She is not ill-appearing.   HENT:      Nose: Nose normal.      Mouth/Throat:      Mouth: Mucous membranes are moist.   Eyes:      Extraocular Movements: Extraocular movements intact.      Pupils: Pupils are equal, round, and reactive to light.   Cardiovascular:      Rate and Rhythm: Normal rate and regular rhythm.      Pulses: Normal pulses.      Heart sounds: Normal heart sounds. No murmur heard.  Pulmonary:      Effort: Pulmonary effort is normal. No respiratory distress.      Breath sounds: Normal breath sounds. No wheezing, rhonchi or rales.   Chest:      Chest wall: No tenderness.   Abdominal:      General: Bowel sounds are normal.      Palpations: Abdomen is soft.   Musculoskeletal:         General: Normal range of motion.      Cervical back: Normal range of motion and neck supple.      Right lower leg: No edema.      Left lower leg: No edema.   Skin:     General: Skin is warm and dry.      Findings: No rash.   Neurological:      General: No focal deficit present.      Mental Status: She is alert and oriented to person, place, and time. Mental status is at baseline.   Psychiatric:         Mood and " Affect: Mood normal.         Behavior: Behavior normal.          Assessment and Plan (including Health Maintenance)      Problem List  Smart Sets  Document Outside HM   :    Plan:     Instructed patient to follow up with pcp regarding proteinuria.     Health Maintenance Due   Topic Date Due    Hepatitis C Screening  Never done    Lipid Panel  Never done    TETANUS VACCINE  Never done    Mammogram  Never done    High Dose Statin  Never done    DEXA Scan  Never done    Colorectal Cancer Screening  Never done    Shingles Vaccine (1 of 2) Never done    RSV Vaccine (Age 60+ and Pregnant patients) (1 - 1-dose 60+ series) Never done    Pneumococcal Vaccines (Age 65+) (1 of 1 - PCV) Never done    Aspirin/Antiplatelet Therapy  05/26/2022    COVID-19 Vaccine (1 - 2023-24 season) Never done       Problem List Items Addressed This Visit    None  Visit Diagnoses       Encounter for other administrative examinations    -  Primary          Encounter for other administrative examinations       Health Maintenance Topics with due status: Not Due       Topic Last Completion Date    Influenza Vaccine 02/08/2022    Hemoglobin A1c (Prediabetes) 09/13/2023           Future Appointments   Date Time Provider Department Center   9/4/2024  8:30 AM Davey Ragsdale MD [facility]        Follow up in about 1 year (around 8/27/2025).     Signature:  Lisa Acosta DNP, FNP  [address] [place] [address]  Phone #: [phone]  Fax #: [phone]    Date of encounter: 8/27/24    Patient Instructions   Follow up in 1 year for DOT.

## 2024-08-27 NOTE — TELEPHONE ENCOUNTER
"Called pt to discuss required documentation for her DOT physical scheduled this afternoon. She says she had a stress test at [facility] in [place] "about a month ago".  Sending request for record.  "

## 2024-11-13 RX ORDER — SOLIFENACIN SUCCINATE 5 MG/1
5 TABLET, FILM COATED ORAL DAILY
Qty: 90 TABLET | Refills: 3 | Status: SHIPPED | OUTPATIENT
Start: 2024-11-13 | End: 2025-11-13

## 2024-11-13 NOTE — TELEPHONE ENCOUNTER
----- Message from Med Assistant Kerry sent at 11/13/2024 11:58 AM CST -----  Who Called: Marci Aguilera    Refill or New Rx:Refill  RX Name and Strength:solifenacin (VESICARE) 5 MG tablet  How is the patient currently taking it? (ex. 1XDay):1x day  Is this a 30 day or 90 day RX:90  List of preferred pharmacies on file (remove unneeded): [facility]      Preferred Method of Contact: Phone Call  Patient's Preferred Phone Number on File: [phone]   Best Call Back Number, if different:  Additional Information: refill, pt is out

## 2025-08-20 ENCOUNTER — TELEPHONE (OUTPATIENT)
Dept: FAMILY MEDICINE | Facility: CLINIC | Age: 75
End: 2025-08-20
Payer: MEDICARE

## 2025-08-21 ENCOUNTER — TELEPHONE (OUTPATIENT)
Dept: FAMILY MEDICINE | Facility: CLINIC | Age: 75
End: 2025-08-21
Payer: MEDICARE

## 2025-08-22 ENCOUNTER — OFFICE VISIT (OUTPATIENT)
Dept: FAMILY MEDICINE | Facility: CLINIC | Age: 75
End: 2025-08-22
Payer: MEDICARE

## 2025-08-22 VITALS
RESPIRATION RATE: 16 BRPM | OXYGEN SATURATION: 96 % | BODY MASS INDEX: 33.65 KG/M2 | HEART RATE: 91 BPM | TEMPERATURE: 99 F | SYSTOLIC BLOOD PRESSURE: 131 MMHG | HEIGHT: 68 IN | WEIGHT: 222 LBS | DIASTOLIC BLOOD PRESSURE: 72 MMHG

## 2025-08-22 DIAGNOSIS — Z02.89 ENCOUNTER FOR OTHER ADMINISTRATIVE EXAMINATIONS: Primary | ICD-10-CM

## 2025-08-22 RX ORDER — UBIDECARENONE 30 MG
30 CAPSULE ORAL DAILY
COMMUNITY

## 2025-08-22 RX ORDER — PANTOPRAZOLE SODIUM 40 MG/1
40 TABLET, DELAYED RELEASE ORAL DAILY
COMMUNITY

## (undated) DEVICE — STOPCOCK 3-WAY ROTATING

## (undated) DEVICE — SENSOR PULSE OX ADULT

## (undated) DEVICE — DRESSING IV TEGADERM 10X12CM

## (undated) DEVICE — SET IV PRIMARY ALARIS (PRIMARY)

## (undated) DEVICE — CATH IV JELCO 22GX1 IN

## (undated) DEVICE — GUIDE LAUNCHER 6FR EBU 3.5

## (undated) DEVICE — SYSTEM BACK STOP CLOSED WASTE

## (undated) DEVICE — GLOVE SURG BIOGEL SZ 7.5

## (undated) DEVICE — ISOVUE 370 100ML

## (undated) DEVICE — DEVICE BLEEDBACK CONTROL VALVE COPILOT

## (undated) DEVICE — GUIDEWIRE EXCHANGE J TIP .035X260CM

## (undated) DEVICE — POSITIONER ULNAR NERVE

## (undated) DEVICE — DRAPE BRACHIAL ANGIOGRAPHY TIBURON

## (undated) DEVICE — TOWEL OR STERILE BLUE 4/PK 20PK/CS

## (undated) DEVICE — CDS ANGIOGRAPHY PACK

## (undated) DEVICE — APPLICATOR CHLORAPREP LITE ORANGE 10.5ML STERILE

## (undated) DEVICE — GUIDEWIRE OMNIWIRE STRAIGHT TIP 185CM

## (undated) DEVICE — CATH DIAG OPTITORQUE 6.5FR JACKY 3.5

## (undated) DEVICE — DEVICE RADIAL TR BAND REG

## (undated) DEVICE — SET IV EXTENSION 42IN W/2 PORT CLEARLINK

## (undated) DEVICE — KIT ANGIO MANIFOLD CUSTOM RFH LEFT HEART KIT

## (undated) DEVICE — GLOVE SURGICAL PROTEXIS PI SIZE 6.5

## (undated) DEVICE — GLIDESHEATH SLENDER INTRODUCER 6FR

## (undated) DEVICE — TUBING CAPNOLINE PLUS SMART 02 CONNECTOR(ORDER 65110)

## (undated) DEVICE — KIT IV START 849

## (undated) DEVICE — BAG-A-JET FLUID DISPENSER SYSTEM